# Patient Record
Sex: FEMALE | Race: WHITE | NOT HISPANIC OR LATINO | Employment: OTHER | URBAN - METROPOLITAN AREA
[De-identification: names, ages, dates, MRNs, and addresses within clinical notes are randomized per-mention and may not be internally consistent; named-entity substitution may affect disease eponyms.]

---

## 2017-05-22 ENCOUNTER — APPOINTMENT (OUTPATIENT)
Dept: RADIOLOGY | Facility: MEDICAL CENTER | Age: 57
DRG: 040 | End: 2017-05-22
Attending: EMERGENCY MEDICINE
Payer: MEDICARE

## 2017-05-22 ENCOUNTER — HOSPITAL ENCOUNTER (INPATIENT)
Facility: MEDICAL CENTER | Age: 57
LOS: 9 days | DRG: 040 | End: 2017-06-01
Attending: EMERGENCY MEDICINE | Admitting: INTERNAL MEDICINE
Payer: MEDICARE

## 2017-05-22 DIAGNOSIS — R51.9 ACUTE NONINTRACTABLE HEADACHE, UNSPECIFIED HEADACHE TYPE: ICD-10-CM

## 2017-05-22 PROCEDURE — 85027 COMPLETE CBC AUTOMATED: CPT

## 2017-05-22 PROCEDURE — 85007 BL SMEAR W/DIFF WBC COUNT: CPT

## 2017-05-22 PROCEDURE — 96374 THER/PROPH/DIAG INJ IV PUSH: CPT

## 2017-05-22 PROCEDURE — 36415 COLL VENOUS BLD VENIPUNCTURE: CPT

## 2017-05-22 PROCEDURE — 86140 C-REACTIVE PROTEIN: CPT

## 2017-05-22 PROCEDURE — 80053 COMPREHEN METABOLIC PANEL: CPT

## 2017-05-22 PROCEDURE — 700105 HCHG RX REV CODE 258: Performed by: EMERGENCY MEDICINE

## 2017-05-22 PROCEDURE — 700111 HCHG RX REV CODE 636 W/ 250 OVERRIDE (IP): Performed by: EMERGENCY MEDICINE

## 2017-05-22 PROCEDURE — 99285 EMERGENCY DEPT VISIT HI MDM: CPT

## 2017-05-22 PROCEDURE — 94760 N-INVAS EAR/PLS OXIMETRY 1: CPT

## 2017-05-22 PROCEDURE — 96361 HYDRATE IV INFUSION ADD-ON: CPT

## 2017-05-22 PROCEDURE — 96375 TX/PRO/DX INJ NEW DRUG ADDON: CPT

## 2017-05-22 PROCEDURE — 85652 RBC SED RATE AUTOMATED: CPT

## 2017-05-22 RX ORDER — ONDANSETRON 2 MG/ML
4 INJECTION INTRAMUSCULAR; INTRAVENOUS ONCE
Status: COMPLETED | OUTPATIENT
Start: 2017-05-22 | End: 2017-05-22

## 2017-05-22 RX ORDER — METHYLPREDNISOLONE SODIUM SUCCINATE 125 MG/2ML
125 INJECTION, POWDER, LYOPHILIZED, FOR SOLUTION INTRAMUSCULAR; INTRAVENOUS ONCE
Status: COMPLETED | OUTPATIENT
Start: 2017-05-22 | End: 2017-05-22

## 2017-05-22 RX ORDER — SODIUM CHLORIDE 9 MG/ML
1000 INJECTION, SOLUTION INTRAVENOUS ONCE
Status: COMPLETED | OUTPATIENT
Start: 2017-05-22 | End: 2017-05-22

## 2017-05-22 RX ADMIN — METHYLPREDNISOLONE SODIUM SUCCINATE 125 MG: 125 INJECTION, POWDER, FOR SOLUTION INTRAMUSCULAR; INTRAVENOUS at 23:33

## 2017-05-22 RX ADMIN — ONDANSETRON 4 MG: 2 INJECTION INTRAMUSCULAR; INTRAVENOUS at 23:32

## 2017-05-22 RX ADMIN — FENTANYL CITRATE 100 MCG: 50 INJECTION, SOLUTION INTRAMUSCULAR; INTRAVENOUS at 23:33

## 2017-05-22 RX ADMIN — SODIUM CHLORIDE 1000 ML: 9 INJECTION, SOLUTION INTRAVENOUS at 23:33

## 2017-05-22 ASSESSMENT — PAIN SCALES - GENERAL: PAINLEVEL_OUTOF10: 10

## 2017-05-22 NOTE — IP AVS SNAPSHOT
Culturalite Access Code: [email or web address]  Expires: 7/1/2017  4:18 PM    Your email address is not on file at [facility].  Email Addresses are required for you to sign up for Culturalite, please contact [phone] to verify your personal information and to provide your email address prior to attempting to register for Culturalite.    Justa Segovia   [address]  [place] [address]    Culturalite  A secure, online tool to manage your health information     [facility]’s Culturalite® is a secure, online tool that connects you to your personalized health information from the privacy of your home -- day or night - making it very easy for you to manage your healthcare. Once the activation process is completed, you can even access your medical information using the Culturalite stiven, which is available for free in the Apple Stiven store or Google Play store.     To learn more about Culturalite, visit [email or web address]    There are two levels of access available (as shown below):   My Chart Features  [facility] Primary Care Doctor [facility]  Specialists [facility]  Urgent  Care Non-[facility] Primary Care Doctor   Email your healthcare team securely and privately 24/7 X X X    Manage appointments: schedule your next appointment; view details of past/upcoming appointments X      Request prescription refills. X      View recent personal medical records, including lab and immunizations X X X X   View health record, including health history, allergies, medications X X X X   Read reports about your outpatient visits, procedures, consult and ER notes X X X X   See your discharge summary, which is a recap of your hospital and/or ER visit that includes your diagnosis, lab results, and care plan X X  X     How to register for [employer]:  Once your e-mail address has been verified, follow the following steps to sign up for Culturalite.     1. Go to  [email or web address]  2. Click on the Sign Up Now box, which takes you to the New Member Sign Up page. You will need to  provide the following information:  a. Enter your [facility] Access Code exactly as it appears at the top of this page. (You will not need to use this code after you’ve completed the sign-up process. If you do not sign up before the expiration date, you must request a new code.)   b. Enter your date of birth.   c. Enter your home email address.   d. Click Submit, and follow the next screen’s instructions.  3. Create a [facility] ID. This will be your [facility] login ID and cannot be changed, so think of one that is secure and easy to remember.  4. Create a [facility] password. You can change your password at any time.  5. Enter your Password Reset Question and Answer. This can be used at a later time if you forget your password.   6. Enter your e-mail address. This allows you to receive e-mail notifications when new information is available in [facility].  7. Click Sign Up. You can now view your health information.    For assistance activating your [facility] account, call [phone]

## 2017-05-22 NOTE — IP AVS SNAPSHOT
" Home Care Instructions                                                                                                                  Name:Justa Segovia  Medical Record Number:[id number]  CSN: [id number]    YOB: 1960   Age: 56 y.o.  Sex: female  HT:1.575 m (5' 2\") WT: 74.8 kg (164 lb 14.5 oz)          Admit Date: 5/22/2017     Discharge Date:   Today's Date: 6/1/2017  Attending Doctor:  [facility] [place]                  Allergies:  Tricyclic antidepressants          Follow-up Information     1. Follow up with VENUS Guerrero M.D.. Schedule an appointment as soon as possible for a visit in 1 week.    Specialty:  Neurology    Contact information    [address] [place] [address]  [phone]          2. Follow up with Vitaliy Peters M.D..    Specialty:  Cardiology    Contact information    [address] [facility] [id number] [phone]           Discharge Medication Instructions:    Below are the medications your physician expects you to take upon discharge:    Review all your home medications and newly ordered medications with your doctor and/or pharmacist. Follow medication instructions as directed by your doctor and/or pharmacist.    Please keep your medication list with you and share with your physician.               Medication List      START taking these medications        Instructions    Morning Afternoon Evening Bedtime    ondansetron 4 MG Tbdp   Commonly known as:  ZOFRAN ODT        Take 1 Tab by mouth every four hours as needed for Nausea/Vomiting (give PO if no IV route available).   Dose:  4 mg                        predniSONE 20 MG Tabs   Commonly known as:  DELTASONE        Take 4 tablets daily for 4 days, then take 3 tablets daily for 4 days, then take 2 tablets daily for 4 days, then take one tablet daily for 4 days, then stop                          CONTINUE taking these medications        Instructions    Morning Afternoon Evening Bedtime    5- MG " Caps        Take 100 mg by mouth every bedtime.   Dose:  100 mg                        Acetyl-L-Carnitine HCl Powd        Take 500 mg by mouth every day.   Dose:  500 mg                        ascorbic acid 500 MG Tabs   Commonly known as:  ascorbic acid        Take 500 mg by mouth every day.   Dose:  500 mg                        B-12 5000 MCG Caps        Take 1 Cap by mouth every day.   Dose:  1 Cap                        CALCIUM 1200 PO        Take 1 Tab by mouth every day.   Dose:  1 Tab                        D-3-5 5000 UNIT Caps   Generic drug:  cholecalciferol        Take 5,000 Units by mouth every day.   Dose:  5000 Units                        darifenacin 15 MG SR tablet   Commonly known as:  ENABLEX        Take 15 mg by mouth every bedtime.   Dose:  15 mg                        Fish Oil 1200 MG Caps        Take 1 Tab by mouth every day.   Dose:  1 Tab                        Green Tea (Camillia sinensis) 200 MG Caps        Take 400 mg by mouth every day.   Dose:  400 mg                        Magnesium 500 MG Caps        Take 1 Cap by mouth every day.   Dose:  1 Cap                        Magnesium Phosphate Powd        Take 1 Dose by mouth every bedtime.   Dose:  1 Dose                        metoprolol SR 25 MG Tb24   Commonly known as:  TOPROL XL        Take 25 mg by mouth 2 Times a Day.   Dose:  25 mg                        pravastatin 40 MG tablet   Last time this was given:  40 mg on 5/31/2017  9:10 PM   Commonly known as:  PRAVACHOL        Take 40 mg by mouth every evening.   Dose:  40 mg                        pregabalin 75 MG Caps   Last time this was given:  75 mg on 6/1/2017  9:42 AM   Commonly known as:  LYRICA        Take 75 mg by mouth 2 times a day.   Dose:  75 mg                        PROBIOTIC DAILY PO        Take 1 Tab by mouth every day.   Dose:  1 Tab                        sertraline 50 MG Tabs   Last time this was given:  50 mg on 5/31/2017  9:09 PM   Commonly known as:  ZOLOFT         Take 50 mg by mouth every bedtime.   Dose:  50 mg                        TECFIDERA 240 MG Cpdr   Generic drug:  Dimethyl Fumarate        Take 240 mg by mouth 2 times a day. Indications: Multiple Sclerosis   Dose:  240 mg                        therapeutic multivitamin-minerals Tabs        Take 1 Tab by mouth every day.   Dose:  1 Tab                        tizanidine 2 MG tablet   Last time this was given:  2 mg on 6/1/2017  9:42 AM   Commonly known as:  ZANAFLEX        Take 2 mg by mouth 2 Times a Day.   Dose:  2 mg                        Turmeric 500 MG Caps        Take 1 Cap by mouth every day.   Dose:  1 Cap                             Where to Get Your Medications      Information about where to get these medications is not yet available     ! Ask your nurse or doctor about these medications    - ondansetron 4 MG Tbdp  - predniSONE 20 MG Tabs            Orders for after discharge     .    Complete by:  As directed    Home oxygen evaluation       DME O2 New Set Up    Complete by:  As directed              Instructions           Diet / Nutrition:    Follow any diet instructions given to you by your doctor or the dietician, including how much salt (sodium) you are allowed each day.    If you are overweight, talk to your doctor about a weight reduction plan.    Activity:    Remain physically active following your doctor's instructions about exercise and activity.    Rest often.     Any time you become even a little tired or short of breath, SIT DOWN and rest.    Worsening Symptoms:    Report any of the following signs and symptoms to the doctor's office immediately:    *Pain of jaw, arm, or neck  *Chest pain not relieved by medication                               *Dizziness or loss of consciousness  *Difficulty breathing even when at rest   *More tired than usual                                       *Bleeding drainage or swelling of surgical site  *Swelling of feet, ankles, legs or stomach                 *Fever  (>100ºF)  *Pink or blood tinged sputum  *Weight gain (3lbs/day or 5lbs /week)           *Shock from internal defibrillator (if applicable)  *Palpitations or irregular heartbeats                *Cool and/or numb extremities    Stroke Awareness    Common Risk Factors for Stroke include:    Age  Atrial Fibrillation  Carotid Artery Stenosis  Diabetes Mellitus  Excessive alcohol consumption  High blood pressure  Overweight   Physical inactivity  Smoking    Warning signs and symptoms of a stroke include:    *Sudden numbness or weakness of the face, arm or leg (especially on one side of the body).  *Sudden confusion, trouble speaking or understanding.  *Sudden trouble seeing in one or both eyes.  *Sudden trouble walking, dizziness, loss of balance or coordination.Sudden severe headache with no known cause.    It is very important to get treatment quickly when a stroke occurs. If you experience any of the above warning signs, call 911 immediately.                   Disclaimer         Quit Smoking / Tobacco Use:    I understand the use of any tobacco products increases my chance of suffering from future heart disease or stroke and could cause other illnesses which may shorten my life. Quitting the use of tobacco products is the single most important thing I can do to improve my health. For further information on smoking / tobacco cessation call a Toll Free Quit Line at 1-907.170.8873 (*[employer] [place]) or 1-670.613.5178 (American Lung Association) or you can access the web based program at www.lungusa.org.    [place] Tobacco Users Help Line:  (360) 149-9914       Toll Free: 1-154.744.1846  Quit Tobacco Program [facility] Management Services [phone]    Crisis Hotline:    [place] Crisis Hotline:  0-694-ZXUTZKN or 1-396.195.9473    [place] Crisis Hotline:    1-433.852.2424 or 685-424-5710    Discharge Survey:   Thank you for choosing [facility]. We hope we did everything we could to make your hospital stay  a pleasant one. You may be receiving a phone survey and we would appreciate your time and participation in answering the questions. Your input is very valuable to us in our efforts to improve our service to our patients and their families.        My signature on this form indicates that:    1. I have reviewed and understand the above information.  2. My questions regarding this information have been answered to my satisfaction.  3. I have formulated a plan with my discharge nurse to obtain my prescribed medications for home.                  Disclaimer         __________________________________                     __________       ________                       Patient Signature                                                 Date                    Time

## 2017-05-22 NOTE — IP AVS SNAPSHOT
6/1/2017    Justa Segovia  [address]  [facility] [address]    Dear Justa:    [facility] wants to ensure your discharge home is safe and you or your loved ones have had all of your questions answered regarding your care after you leave the hospital.    Below is a list of resources and contact information should you have any questions regarding your hospital stay, follow-up instructions, or active medical symptoms.    Questions or Concerns Regarding… Contact   Medical Questions Related to Your Discharge  (7 days a week, 8am-5pm) Contact a Nurse Care Coordinator   [phone]   Medical Questions Not Related to Your Discharge  (24 hours a day / 7 days a week)  Contact the Nurse Health Line   [phone]    Medications or Discharge Instructions Refer to your discharge packet   or contact your [facility] Primary Care Provider   [phone]   Follow-up Appointment(s) Schedule your appointment via thereNow   or contact Scheduling [phone]   Billing Review your statement via thereNow  or contact Billing [phone]   Medical Records Review your records via thereNow   or contact Medical Records [phone]     You may receive a telephone call within two days of discharge. This call is to make certain you understand your discharge instructions and have the opportunity to have any questions answered. You can also easily access your medical information, test results and upcoming appointments via the thereNow free online health management tool. You can learn more and sign up at [email or web address]. For assistance setting up your thereNow account, please call [phone].    Once again, we want to ensure your discharge home is safe and that you have a clear understanding of any next steps in your care. If you have any questions or concerns, please do not hesitate to contact us, we are here for you. Thank you for choosing [facility] for your healthcare needs.    Sincerely,    Your [facility] Healthcare Team

## 2017-05-22 NOTE — IP AVS SNAPSHOT
" <p align=\"LEFT\"><IMG SRC=\"//[email or web address]/blob$/[email or web address]\" alt=\"Image\" WIDTH=\"50%\" HEIGHT=\"200\" BORDER=\"\"></p>                   Name:Justa Segovia  Medical Record Number:[id number]  CSN: [id number]    YOB: 1960   Age: 56 y.o.  Sex: female  HT:1.575 m (5' 2\") WT: 74.8 kg (164 lb 14.5 oz)          Admit Date: 5/22/2017     Discharge Date:   Today's Date: 6/1/2017  Attending Doctor:  [facility] [place]                  Allergies:  Tricyclic antidepressants          Follow-up Information     1. Follow up with VENUS Guerrero M.D.. Schedule an appointment as soon as possible for a visit in 1 week.    Specialty:  Neurology    Contact information    [address] [place] [address]  [phone]          2. Follow up with Vitaliy Peters M.D..    Specialty:  Cardiology    Contact information    [address] [facility] [address]  [phone]           Medication List      Take these Medications        Instructions    5- MG Caps    Take 100 mg by mouth every bedtime.   Dose:  100 mg       Acetyl-L-Carnitine HCl Powd    Take 500 mg by mouth every day.   Dose:  500 mg       ascorbic acid 500 MG Tabs   Commonly known as:  ascorbic acid    Take 500 mg by mouth every day.   Dose:  500 mg       B-12 5000 MCG Caps    Take 1 Cap by mouth every day.   Dose:  1 Cap       CALCIUM 1200 PO    Take 1 Tab by mouth every day.   Dose:  1 Tab       D-3-5 5000 UNIT Caps   Generic drug:  cholecalciferol    Take 5,000 Units by mouth every day.   Dose:  5000 Units       darifenacin 15 MG SR tablet   Commonly known as:  ENABLEX    Take 15 mg by mouth every bedtime.   Dose:  15 mg       Fish Oil 1200 MG Caps    Take 1 Tab by mouth every day.   Dose:  1 Tab       Green Tea (Camillia sinensis) 200 MG Caps    Take 400 mg by mouth every day.   Dose:  400 mg       Magnesium 500 MG Caps    Take 1 Cap by mouth every day.   Dose:  1 Cap       Magnesium Phosphate Powd    Take 1 Dose by mouth every bedtime.   Dose:  " 1 Dose       metoprolol SR 25 MG Tb24   Commonly known as:  TOPROL XL    Take 25 mg by mouth 2 Times a Day.   Dose:  25 mg       ondansetron 4 MG Tbdp   Commonly known as:  ZOFRAN ODT    Take 1 Tab by mouth every four hours as needed for Nausea/Vomiting (give PO if no IV route available).   Dose:  4 mg       pravastatin 40 MG tablet   Commonly known as:  PRAVACHOL    Take 40 mg by mouth every evening.   Dose:  40 mg       predniSONE 20 MG Tabs   Commonly known as:  DELTASONE    Take 4 tablets daily for 4 days, then take 3 tablets daily for 4 days, then take 2 tablets daily for 4 days, then take one tablet daily for 4 days, then stop       pregabalin 75 MG Caps   Commonly known as:  LYRICA    Take 75 mg by mouth 2 times a day.   Dose:  75 mg       PROBIOTIC DAILY PO    Take 1 Tab by mouth every day.   Dose:  1 Tab       sertraline 50 MG Tabs   Commonly known as:  ZOLOFT    Take 50 mg by mouth every bedtime.   Dose:  50 mg       TECFIDERA 240 MG Cpdr   Generic drug:  Dimethyl Fumarate    Take 240 mg by mouth 2 times a day. Indications: Multiple Sclerosis   Dose:  240 mg       therapeutic multivitamin-minerals Tabs    Take 1 Tab by mouth every day.   Dose:  1 Tab       tizanidine 2 MG tablet   Commonly known as:  ZANAFLEX    Take 2 mg by mouth 2 Times a Day.   Dose:  2 mg       Turmeric 500 MG Caps    Take 1 Cap by mouth every day.   Dose:  1 Cap

## 2017-05-23 ENCOUNTER — APPOINTMENT (OUTPATIENT)
Dept: RADIOLOGY | Facility: MEDICAL CENTER | Age: 57
DRG: 040 | End: 2017-05-23
Attending: INTERNAL MEDICINE
Payer: MEDICARE

## 2017-05-23 ENCOUNTER — RESOLUTE PROFESSIONAL BILLING HOSPITAL PROF FEE (OUTPATIENT)
Dept: HOSPITALIST | Facility: MEDICAL CENTER | Age: 57
End: 2017-05-23
Payer: MEDICARE

## 2017-05-23 PROBLEM — G35 MULTIPLE SCLEROSIS EXACERBATION (HCC): Status: ACTIVE | Noted: 2017-05-23

## 2017-05-23 PROBLEM — M31.6 TEMPORAL ARTERITIS (HCC): Status: ACTIVE | Noted: 2017-05-23

## 2017-05-23 LAB
ALBUMIN SERPL BCP-MCNC: 3.6 G/DL (ref 3.2–4.9)
ALBUMIN/GLOB SERPL: 1 G/DL
ALP SERPL-CCNC: 105 U/L (ref 30–99)
ALT SERPL-CCNC: 22 U/L (ref 2–50)
ANION GAP SERPL CALC-SCNC: 12 MMOL/L (ref 0–11.9)
APPEARANCE UR: CLEAR
AST SERPL-CCNC: 27 U/L (ref 12–45)
BASOPHILS # BLD AUTO: 0 % (ref 0–1.8)
BASOPHILS # BLD: 0 K/UL (ref 0–0.12)
BILIRUB SERPL-MCNC: 0.7 MG/DL (ref 0.1–1.5)
BILIRUB UR QL STRIP.AUTO: NEGATIVE
BUN SERPL-MCNC: 20 MG/DL (ref 8–22)
CALCIUM SERPL-MCNC: 9.2 MG/DL (ref 8.5–10.5)
CHLORIDE SERPL-SCNC: 101 MMOL/L (ref 96–112)
CO2 SERPL-SCNC: 21 MMOL/L (ref 20–33)
COLOR UR: ABNORMAL
CREAT SERPL-MCNC: 1.25 MG/DL (ref 0.5–1.4)
CRP SERPL HS-MCNC: 48.19 MG/DL (ref 0–0.75)
EKG IMPRESSION: NORMAL
EOSINOPHIL # BLD AUTO: 0 K/UL (ref 0–0.51)
EOSINOPHIL NFR BLD: 0 % (ref 0–6.9)
EPI CELLS #/AREA URNS HPF: ABNORMAL /HPF
ERYTHROCYTE [DISTWIDTH] IN BLOOD BY AUTOMATED COUNT: 43.2 FL (ref 35.9–50)
ERYTHROCYTE [SEDIMENTATION RATE] IN BLOOD BY WESTERGREN METHOD: 114 MM/HOUR (ref 0–30)
GFR SERPL CREATININE-BSD FRML MDRD: 45 ML/MIN/1.73 M 2
GLOBULIN SER CALC-MCNC: 3.6 G/DL (ref 1.9–3.5)
GLUCOSE SERPL-MCNC: 125 MG/DL (ref 65–99)
GLUCOSE UR STRIP.AUTO-MCNC: NEGATIVE MG/DL
HCT VFR BLD AUTO: 35.7 % (ref 37–47)
HGB BLD-MCNC: 12.1 G/DL (ref 12–16)
KETONES UR STRIP.AUTO-MCNC: 10 MG/DL
LEUKOCYTE ESTERASE UR QL STRIP.AUTO: NEGATIVE
LG PLATELETS BLD QL SMEAR: NORMAL
LYMPHOCYTES # BLD AUTO: 0.95 K/UL (ref 1–4.8)
LYMPHOCYTES NFR BLD: 14 % (ref 22–41)
MAGNESIUM SERPL-MCNC: 2.2 MG/DL (ref 1.5–2.5)
MANUAL DIFF BLD: NORMAL
MCH RBC QN AUTO: 32.3 PG (ref 27–33)
MCHC RBC AUTO-ENTMCNC: 33.9 G/DL (ref 33.6–35)
MCV RBC AUTO: 95.2 FL (ref 81.4–97.8)
MICRO URNS: ABNORMAL
MONOCYTES # BLD AUTO: 0.2 K/UL (ref 0–0.85)
MONOCYTES NFR BLD AUTO: 3 % (ref 0–13.4)
MORPHOLOGY BLD-IMP: NORMAL
NEUTROPHILS # BLD AUTO: 5.64 K/UL (ref 2–7.15)
NEUTROPHILS NFR BLD: 77 % (ref 44–72)
NEUTS BAND NFR BLD MANUAL: 6 % (ref 0–10)
NITRITE UR QL STRIP.AUTO: NEGATIVE
NRBC # BLD AUTO: 0 K/UL
NRBC BLD AUTO-RTO: 0 /100 WBC
PH UR STRIP.AUTO: 6 [PH]
PLATELET # BLD AUTO: 212 K/UL (ref 164–446)
PLATELET BLD QL SMEAR: NORMAL
PMV BLD AUTO: 11.7 FL (ref 9–12.9)
POTASSIUM SERPL-SCNC: 3.8 MMOL/L (ref 3.6–5.5)
PROT SERPL-MCNC: 7.2 G/DL (ref 6–8.2)
PROT UR QL STRIP: NEGATIVE MG/DL
RBC # BLD AUTO: 3.75 M/UL (ref 4.2–5.4)
RBC # URNS HPF: ABNORMAL /HPF
RBC BLD AUTO: NORMAL
RBC UR QL AUTO: ABNORMAL
SMUDGE CELLS BLD QL SMEAR: NORMAL
SODIUM SERPL-SCNC: 134 MMOL/L (ref 135–145)
SP GR UR STRIP.AUTO: 1.01
WBC # BLD AUTO: 6.8 K/UL (ref 4.8–10.8)
WBC #/AREA URNS HPF: ABNORMAL /HPF

## 2017-05-23 PROCEDURE — 96375 TX/PRO/DX INJ NEW DRUG ADDON: CPT

## 2017-05-23 PROCEDURE — 700111 HCHG RX REV CODE 636 W/ 250 OVERRIDE (IP): Performed by: EMERGENCY MEDICINE

## 2017-05-23 PROCEDURE — A9270 NON-COVERED ITEM OR SERVICE: HCPCS | Performed by: INTERNAL MEDICINE

## 2017-05-23 PROCEDURE — 72156 MRI NECK SPINE W/O & W/DYE: CPT

## 2017-05-23 PROCEDURE — 99223 1ST HOSP IP/OBS HIGH 75: CPT | Mod: AI,GC | Performed by: INTERNAL MEDICINE

## 2017-05-23 PROCEDURE — 71010 DX-CHEST-PORTABLE (1 VIEW): CPT

## 2017-05-23 PROCEDURE — 700111 HCHG RX REV CODE 636 W/ 250 OVERRIDE (IP): Performed by: INTERNAL MEDICINE

## 2017-05-23 PROCEDURE — 36415 COLL VENOUS BLD VENIPUNCTURE: CPT

## 2017-05-23 PROCEDURE — 72158 MRI LUMBAR SPINE W/O & W/DYE: CPT

## 2017-05-23 PROCEDURE — 700102 HCHG RX REV CODE 250 W/ 637 OVERRIDE(OP): Performed by: INTERNAL MEDICINE

## 2017-05-23 PROCEDURE — 96361 HYDRATE IV INFUSION ADD-ON: CPT

## 2017-05-23 PROCEDURE — 93010 ELECTROCARDIOGRAM REPORT: CPT | Performed by: INTERNAL MEDICINE

## 2017-05-23 PROCEDURE — 81001 URINALYSIS AUTO W/SCOPE: CPT

## 2017-05-23 PROCEDURE — 700102 HCHG RX REV CODE 250 W/ 637 OVERRIDE(OP): Performed by: PSYCHIATRY & NEUROLOGY

## 2017-05-23 PROCEDURE — 70553 MRI BRAIN STEM W/O & W/DYE: CPT

## 2017-05-23 PROCEDURE — 70450 CT HEAD/BRAIN W/O DYE: CPT

## 2017-05-23 PROCEDURE — A9270 NON-COVERED ITEM OR SERVICE: HCPCS | Performed by: PSYCHIATRY & NEUROLOGY

## 2017-05-23 PROCEDURE — 700105 HCHG RX REV CODE 258: Performed by: PSYCHIATRY & NEUROLOGY

## 2017-05-23 PROCEDURE — 83735 ASSAY OF MAGNESIUM: CPT

## 2017-05-23 PROCEDURE — 700117 HCHG RX CONTRAST REV CODE 255: Performed by: INTERNAL MEDICINE

## 2017-05-23 PROCEDURE — A9577 INJ MULTIHANCE: HCPCS | Performed by: INTERNAL MEDICINE

## 2017-05-23 PROCEDURE — 93005 ELECTROCARDIOGRAM TRACING: CPT | Performed by: STUDENT IN AN ORGANIZED HEALTH CARE EDUCATION/TRAINING PROGRAM

## 2017-05-23 PROCEDURE — 700105 HCHG RX REV CODE 258: Performed by: INTERNAL MEDICINE

## 2017-05-23 PROCEDURE — 770020 HCHG ROOM/CARE - TELE (206)

## 2017-05-23 PROCEDURE — 700111 HCHG RX REV CODE 636 W/ 250 OVERRIDE (IP): Performed by: PSYCHIATRY & NEUROLOGY

## 2017-05-23 RX ORDER — ASCORBIC ACID 125 MG
1 TABLET,CHEWABLE ORAL DAILY
COMMUNITY

## 2017-05-23 RX ORDER — PREGABALIN 75 MG/1
75 CAPSULE ORAL 2 TIMES DAILY
COMMUNITY

## 2017-05-23 RX ORDER — ONDANSETRON 2 MG/ML
4 INJECTION INTRAMUSCULAR; INTRAVENOUS EVERY 4 HOURS PRN
Status: DISCONTINUED | OUTPATIENT
Start: 2017-05-23 | End: 2017-06-02 | Stop reason: HOSPADM

## 2017-05-23 RX ORDER — SODIUM CHLORIDE 9 MG/ML
INJECTION, SOLUTION INTRAVENOUS CONTINUOUS
Status: DISCONTINUED | OUTPATIENT
Start: 2017-05-23 | End: 2017-05-25 | Stop reason: ALTCHOICE

## 2017-05-23 RX ORDER — ACETYLCARNITINE HCL 100 %
500 POWDER (GRAM) MISCELLANEOUS DAILY
COMMUNITY

## 2017-05-23 RX ORDER — FOLIC ACID 0.8 MG
1 TABLET ORAL DAILY
COMMUNITY

## 2017-05-23 RX ORDER — BISACODYL 10 MG
10 SUPPOSITORY, RECTAL RECTAL
Status: DISCONTINUED | OUTPATIENT
Start: 2017-05-23 | End: 2017-06-02 | Stop reason: HOSPADM

## 2017-05-23 RX ORDER — PRAVASTATIN SODIUM 20 MG
40 TABLET ORAL NIGHTLY
Status: DISCONTINUED | OUTPATIENT
Start: 2017-05-23 | End: 2017-06-02 | Stop reason: HOSPADM

## 2017-05-23 RX ORDER — PROMETHAZINE HYDROCHLORIDE 25 MG/1
12.5-25 TABLET ORAL EVERY 4 HOURS PRN
Status: DISCONTINUED | OUTPATIENT
Start: 2017-05-23 | End: 2017-06-02 | Stop reason: HOSPADM

## 2017-05-23 RX ORDER — AMOXICILLIN 250 MG
2 CAPSULE ORAL 2 TIMES DAILY
Status: DISCONTINUED | OUTPATIENT
Start: 2017-05-23 | End: 2017-06-02 | Stop reason: HOSPADM

## 2017-05-23 RX ORDER — ONDANSETRON 4 MG/1
4 TABLET, ORALLY DISINTEGRATING ORAL EVERY 4 HOURS PRN
Status: DISCONTINUED | OUTPATIENT
Start: 2017-05-23 | End: 2017-06-02 | Stop reason: HOSPADM

## 2017-05-23 RX ORDER — METOPROLOL SUCCINATE 25 MG/1
25 TABLET, EXTENDED RELEASE ORAL 2 TIMES DAILY
COMMUNITY
End: 2019-11-17

## 2017-05-23 RX ORDER — POLYETHYLENE GLYCOL 3350 17 G/17G
1 POWDER, FOR SOLUTION ORAL
Status: DISCONTINUED | OUTPATIENT
Start: 2017-05-23 | End: 2017-06-02 | Stop reason: HOSPADM

## 2017-05-23 RX ORDER — PRAVASTATIN SODIUM 40 MG
40 TABLET ORAL NIGHTLY
COMMUNITY

## 2017-05-23 RX ORDER — AMOXICILLIN 500 MG
1 CAPSULE ORAL DAILY
COMMUNITY

## 2017-05-23 RX ORDER — M-VIT,TX,IRON,MINS/CALC/FOLIC 27MG-0.4MG
1 TABLET ORAL DAILY
COMMUNITY

## 2017-05-23 RX ORDER — DARIFENACIN HYDROBROMIDE 15 MG/1
15 TABLET, EXTENDED RELEASE ORAL
COMMUNITY

## 2017-05-23 RX ORDER — GUAIFENESIN/EPHEDRINE HCL 200-12.5MG
100 TABLET ORAL
COMMUNITY

## 2017-05-23 RX ORDER — DIMETHYL FUMARATE 240 MG/1
240 CAPSULE ORAL 2 TIMES DAILY
Status: DISCONTINUED | OUTPATIENT
Start: 2017-05-23 | End: 2017-05-23

## 2017-05-23 RX ORDER — OXYCODONE HYDROCHLORIDE AND ACETAMINOPHEN 5; 325 MG/1; MG/1
1 TABLET ORAL EVERY 4 HOURS PRN
Status: DISCONTINUED | OUTPATIENT
Start: 2017-05-23 | End: 2017-05-24

## 2017-05-23 RX ORDER — ACETYLCARNITINE HCL 100 %
POWDER (GRAM) MISCELLANEOUS
Status: ON HOLD | COMMUNITY
End: 2017-05-23

## 2017-05-23 RX ORDER — VIT C/B6/B5/MAGNESIUM/HERB 173 50-5-6-5MG
1 CAPSULE ORAL DAILY
COMMUNITY

## 2017-05-23 RX ORDER — OXYBUTYNIN CHLORIDE 10 MG/1
10 TABLET, EXTENDED RELEASE ORAL EVERY EVENING
Status: DISCONTINUED | OUTPATIENT
Start: 2017-05-23 | End: 2017-06-02 | Stop reason: HOSPADM

## 2017-05-23 RX ORDER — TIZANIDINE 4 MG/1
2 TABLET ORAL 2 TIMES DAILY
Status: DISCONTINUED | OUTPATIENT
Start: 2017-05-23 | End: 2017-06-02 | Stop reason: HOSPADM

## 2017-05-23 RX ORDER — PREGABALIN 75 MG/1
75 CAPSULE ORAL 2 TIMES DAILY
Status: DISCONTINUED | OUTPATIENT
Start: 2017-05-23 | End: 2017-06-02 | Stop reason: HOSPADM

## 2017-05-23 RX ORDER — CARBAMAZEPINE 200 MG/1
200 TABLET ORAL DAILY
Status: DISCONTINUED | OUTPATIENT
Start: 2017-05-23 | End: 2017-05-23

## 2017-05-23 RX ORDER — ASCORBIC ACID 500 MG
500 TABLET ORAL DAILY
COMMUNITY

## 2017-05-23 RX ORDER — PRAVASTATIN SODIUM 20 MG
40 TABLET ORAL NIGHTLY
Status: ON HOLD | COMMUNITY
End: 2017-05-23

## 2017-05-23 RX ORDER — DIMETHYL FUMARATE 240 MG/1
240 CAPSULE ORAL 2 TIMES DAILY
COMMUNITY

## 2017-05-23 RX ORDER — UBIDECARENONE 75 MG
5000 CAPSULE ORAL DAILY
Status: ON HOLD | COMMUNITY
End: 2017-05-23

## 2017-05-23 RX ORDER — MAGNESIUM PHOSPHATE
1 POWDER (GRAM) MISCELLANEOUS
COMMUNITY
End: 2019-11-17

## 2017-05-23 RX ORDER — DARIFENACIN HYDROBROMIDE 15 MG/1
15 TABLET, EXTENDED RELEASE ORAL
Status: DISCONTINUED | OUTPATIENT
Start: 2017-05-23 | End: 2017-05-23

## 2017-05-23 RX ORDER — TIZANIDINE 4 MG/1
2 TABLET ORAL 2 TIMES DAILY
Status: ON HOLD | COMMUNITY
End: 2017-05-23

## 2017-05-23 RX ORDER — TIZANIDINE 2 MG/1
2 TABLET ORAL 2 TIMES DAILY
COMMUNITY

## 2017-05-23 RX ORDER — CARBAMAZEPINE 200 MG/1
200 TABLET ORAL 2 TIMES DAILY
Status: DISCONTINUED | OUTPATIENT
Start: 2017-05-23 | End: 2017-05-26

## 2017-05-23 RX ORDER — PROMETHAZINE HYDROCHLORIDE 25 MG/1
12.5-25 SUPPOSITORY RECTAL EVERY 4 HOURS PRN
Status: DISCONTINUED | OUTPATIENT
Start: 2017-05-23 | End: 2017-06-02 | Stop reason: HOSPADM

## 2017-05-23 RX ADMIN — SERTRALINE 50 MG: 50 TABLET, FILM COATED ORAL at 21:43

## 2017-05-23 RX ADMIN — STANDARDIZED SENNA CONCENTRATE AND DOCUSATE SODIUM 2 TABLET: 8.6; 5 TABLET, FILM COATED ORAL at 08:13

## 2017-05-23 RX ADMIN — OXYCODONE HYDROCHLORIDE AND ACETAMINOPHEN 1 TABLET: 5; 325 TABLET ORAL at 21:54

## 2017-05-23 RX ADMIN — SODIUM CHLORIDE 500 MG: 9 INJECTION, SOLUTION INTRAVENOUS at 06:32

## 2017-05-23 RX ADMIN — STANDARDIZED SENNA CONCENTRATE AND DOCUSATE SODIUM 2 TABLET: 8.6; 5 TABLET, FILM COATED ORAL at 21:42

## 2017-05-23 RX ADMIN — PRAVASTATIN SODIUM 40 MG: 20 TABLET ORAL at 21:42

## 2017-05-23 RX ADMIN — TIZANIDINE 2 MG: 4 TABLET ORAL at 08:13

## 2017-05-23 RX ADMIN — OXYCODONE HYDROCHLORIDE AND ACETAMINOPHEN 1 TABLET: 5; 325 TABLET ORAL at 14:27

## 2017-05-23 RX ADMIN — OXYCODONE HYDROCHLORIDE AND ACETAMINOPHEN 1 TABLET: 5; 325 TABLET ORAL at 18:03

## 2017-05-23 RX ADMIN — PROCHLORPERAZINE EDISYLATE 10 MG: 5 INJECTION INTRAMUSCULAR; INTRAVENOUS at 05:57

## 2017-05-23 RX ADMIN — PREGABALIN 75 MG: 75 CAPSULE ORAL at 08:13

## 2017-05-23 RX ADMIN — HYDROMORPHONE HYDROCHLORIDE 0.5 MG: 1 INJECTION, SOLUTION INTRAMUSCULAR; INTRAVENOUS; SUBCUTANEOUS at 05:42

## 2017-05-23 RX ADMIN — CARBAMAZEPINE 200 MG: 200 TABLET ORAL at 21:42

## 2017-05-23 RX ADMIN — OXYBUTYNIN CHLORIDE 10 MG: 10 TABLET, FILM COATED, EXTENDED RELEASE ORAL at 21:45

## 2017-05-23 RX ADMIN — PREGABALIN 75 MG: 75 CAPSULE ORAL at 21:44

## 2017-05-23 RX ADMIN — SODIUM CHLORIDE 500 MG: 9 INJECTION, SOLUTION INTRAVENOUS at 21:42

## 2017-05-23 RX ADMIN — TIZANIDINE 2 MG: 4 TABLET ORAL at 21:43

## 2017-05-23 RX ADMIN — GADOBENATE DIMEGLUMINE 10 ML: 529 INJECTION, SOLUTION INTRAVENOUS at 12:27

## 2017-05-23 RX ADMIN — ENOXAPARIN SODIUM 40 MG: 100 INJECTION SUBCUTANEOUS at 08:12

## 2017-05-23 RX ADMIN — SODIUM CHLORIDE: 9 INJECTION, SOLUTION INTRAVENOUS at 01:36

## 2017-05-23 RX ADMIN — HYDROMORPHONE HYDROCHLORIDE 1 MG: 1 INJECTION, SOLUTION INTRAMUSCULAR; INTRAVENOUS; SUBCUTANEOUS at 01:32

## 2017-05-23 RX ADMIN — CARBAMAZEPINE 200 MG: 200 TABLET ORAL at 03:28

## 2017-05-23 RX ADMIN — ONDANSETRON 4 MG: 2 INJECTION INTRAMUSCULAR; INTRAVENOUS at 05:24

## 2017-05-23 ASSESSMENT — ENCOUNTER SYMPTOMS
COUGH: 0
WEAKNESS: 1
FALLS: 0
CHILLS: 0
CHILLS: 1
VOMITING: 1
WHEEZING: 0
SPUTUM PRODUCTION: 0
SORE THROAT: 0
PALPITATIONS: 0
EYE REDNESS: 0
PHOTOPHOBIA: 0
HEARTBURN: 0
TINGLING: 1
SENSORY CHANGE: 1
NECK PAIN: 0
DIZZINESS: 0
HEMOPTYSIS: 0
ABDOMINAL PAIN: 0
SPEECH CHANGE: 0
CONSTIPATION: 0
SHORTNESS OF BREATH: 0
BRUISES/BLEEDS EASILY: 0
DOUBLE VISION: 0
TINGLING: 0
DIARRHEA: 0
WEIGHT LOSS: 0
MYALGIAS: 1
FEVER: 0
SENSORY CHANGE: 0
DIAPHORESIS: 0
BACK PAIN: 0
TREMORS: 0
EYE PAIN: 0
BLURRED VISION: 0
HEADACHES: 1
NAUSEA: 1
FOCAL WEAKNESS: 0
SEIZURES: 0
ORTHOPNEA: 0
NECK PAIN: 1
LOSS OF CONSCIOUSNESS: 0
DEPRESSION: 1

## 2017-05-23 ASSESSMENT — COPD QUESTIONNAIRES
DURING THE PAST 4 WEEKS HOW MUCH DID YOU FEEL SHORT OF BREATH: NONE/LITTLE OF THE TIME
HAVE YOU SMOKED AT LEAST 100 CIGARETTES IN YOUR ENTIRE LIFE: NO/DON'T KNOW
COPD SCREENING SCORE: 1
DO YOU EVER COUGH UP ANY MUCUS OR PHLEGM?: NO/ONLY WITH OCCASIONAL COLDS OR INFECTIONS

## 2017-05-23 ASSESSMENT — LIFESTYLE VARIABLES
EVER_SMOKED: NEVER
EVER_SMOKED: NEVER
ALCOHOL_USE: NO

## 2017-05-23 ASSESSMENT — PAIN SCALES - GENERAL
PAINLEVEL_OUTOF10: 6
PAINLEVEL_OUTOF10: 6
PAINLEVEL_OUTOF10: 0
PAINLEVEL_OUTOF10: 10
PAINLEVEL_OUTOF10: 3

## 2017-05-23 NOTE — H&P
[facility] Admitting History and Physical    Name Justa Segovia 1960   Age/Sex 56 y.o. female   MRN [id number]   Code Status Full     After 5PM or if no immediate response to page, please call for cross-coverage  Attending/Team: Dr Ivan/Preston Call [phone] to page   1st Call - Day Intern (R1):   Dr Russell 2nd Call - Day Sr. 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    Patient symptoms started on Thursday  (May 18th 2017) with severe Leg cramps, heaviness of both legs, followed 2 days later by severe headache unilateral left side temporal region and not responding to Tylenol, ibuprofen , Lyrica, Toprol XL, Tecfidera, and  Bladder control lost since yesterday.     Since yesterday her pain level in the temporal region increased to 10/ 10 electric shooting pains and went to the [facility] where she was given Toradol 30 mg IV Dilaudid 1 mg IV, Zofran 4 mg, diazepam 5 mg, and Fentanyl 100 mg IV with partial relief. She declined any recent exposure to sick contacts, or any urinary tract infection, cough, fever, chest pain, shortness of breath, or joint pains. She declined any history of visual disturbances, lacrimation, photophobia, jaw claudication, or seizures.    Her past medical history is significant for first time episode of multiple sclerosis, presenting as seizures while she was 22 years old, treated with phenobarbital.  After 10 years of history of suffering with seizures she was later diagnosed as multiple sclerosis. Her last flare was 12 years ago which was the worst one she can recollect. At that time she was  given very high doses of steroids (1200 mg of methylprednisolone for 5 days). She has been on Lyrica 75 mg, sertraline 50 mg, pravastatin 40 mg, Toprol-XL 25 mg, Tecfidera for MS, amantadine for muscle cramps, and hydromorphone for breakthrough pains. While she was on high doses of steroids she had some tachyarrhythmia's and was started on metoprolol for irregular heart rate which she has been continuing to take.     Surgical and endocrine history; she had a ruptured ovarian cyst which was hemorrhagic and underwent a right oophorectomy more than 10 years ago. She has never been pregnant, although she had male sexual partnerse as a teenager. She achieved her menopause 5 years ago.    Social history is significant for; she is a nurse by occupation, she is  to a female partner (Lesbian) , and lives with her. She occasionally uses marijuana, smoked in the past while teenager but never smoked later. Drinks occasionally alcohol on social events.     Family history significant for father  of multiple sclerosis, both parents had had hypercholesterolemia, she has 3 sisters older one has multiple sclerosis at 60 years , also has Niece with MS at the age of 29    Health maintenance history includes; colonoscopy 3 years ago was normal Pap smear and mammogram done in 2016 was normal.    ED Course: Pt was moaning with excruciating pain, despite IV Fentanyl 100 mg, Hydromophone, Ondanestron, and Methyprednisolone 125mg IV. Neuro was consulted recs appreciated.        Review of Systems   Constitutional: Negative for fever, chills, weight loss and diaphoresis.   HENT: Negative for ear pain, hearing loss, sore throat and tinnitus.    Eyes: Negative for blurred vision, double vision, photophobia, pain and redness.   Respiratory: Negative for cough, hemoptysis, sputum production, shortness of breath and wheezing.    Cardiovascular: Negative for chest pain, palpitations and orthopnea.   Gastrointestinal: Positive for  "nausea and vomiting. Negative for heartburn, abdominal pain, diarrhea and constipation.   Genitourinary:        Incontinence of Urine   Musculoskeletal: Positive for myalgias and neck pain. Negative for falls.   Skin: Negative for rash.   Neurological: Positive for tingling, sensory change, weakness and headaches. Negative for speech change, seizures and loss of consciousness.   Endo/Heme/Allergies: Does not bruise/bleed easily.   Psychiatric/Behavioral: Positive for depression. Negative for suicidal ideas.             Past Medical History:   Past Medical History   Diagnosis Date   • MS (multiple sclerosis) (CMS-HCC) 1993   • Hyperlipemia    • Migraines 1989   • Irregular heart rate 1997     \"short run VT\"   • Gilbert syndrome    • S/P MVR (mitral valve replacement)        Past Surgical History:  History reviewed. No pertinent past surgical history.    Current Outpatient Medications:  Home Medications     Reviewed by Taras Blanco R.N. (Registered Nurse) on 05/23/17 at 0511  Med List Status: Complete    Medication Last Dose Status    5-Hydroxytryptophan (5-HTP) 100 MG Cap  Active    Acetylcarnitine HCl (ACETYL-L-CARNITINE HCL) Powder  Active    ascorbic acid (ASCORBIC ACID) 500 MG Tab  Active    Calcium Carbonate-Vit D-Min (CALCIUM 1200 PO)  Active    cyanocobalamin (VITAMIN B-12) 100 MCG Tab  Active    darifenacin (ENABLEX) 15 MG SR tablet  Active    Dimethyl Fumarate (TECFIDERA) 240 MG CAPSULE DELAYED RELEASE  Active    Green Tea, Camillia sinensis, 200 MG Cap  Active    Magnesium 500 MG Cap  Active    Magnesium Phosphate Powder  Active    metoprolol (LOPRESSOR) 25 MG Tab  Active    Omega-3 Fatty Acids (FISH OIL) 1200 MG Cap  Active    pravastatin (PRAVACHOL) 20 MG Tab  Active    pregabalin (LYRICA) 75 MG Cap  Active    Prenatal Vit-Fe Fumarate-FA (M-VIT PO)  Active    Probiotic Product (PROBIOTIC DAILY PO)  Active    sertraline (ZOLOFT) 50 MG Tab  Active    tizanidine (ZANAFLEX) 4 MG Tab  Active    Turmeric 500 " "MG Cap  Active    vitamin D (CHOLECALCIFEROL) 1000 UNIT Tab  Active                Medication Allergy/Sensitivities:  Allergies not on file      Family History:  History reviewed. No pertinent family history.    Social History:  Social History     Social History   • Marital Status:      Spouse Name: N/A   • Number of Children: N/A   • Years of Education: N/A     Occupational History   • Not on file.     Social History Main Topics   • Smoking status: Not on file   • Smokeless tobacco: Not on file   • Alcohol Use: Not on file   • Drug Use: Not on file   • Sexual Activity: Not on file     Other Topics Concern   • Not on file     Social History Narrative   • No narrative on file     Living situation: Lives with her female partner  PCP : Vitaliy Peters M.D.        Physical Exam     Filed Vitals:    05/22/17 2313 05/22/17 2324 05/22/17 2330   BP:  91/53    Pulse:  79 72   Temp:  37.4 °C (99.3 °F)    Resp:  20    Height: 1.575 m (5' 2\")     Weight: 68.04 kg (150 lb)     SpO2:  96% 98%     Body mass index is 27.43 kg/(m^2).  BP 91/53 mmHg  Pulse 72  Temp(Src) 37.4 °C (99.3 °F)  Resp 20  Ht 1.575 m (5' 2\")  Wt 68.04 kg (150 lb)  BMI 27.43 kg/m2  SpO2 98%  O2 therapy: Pulse Oximetry: 98 %, O2 Delivery: None (Room Air)    Physical Exam   Constitutional: She is oriented to person, place, and time.   Patient is acutely distressed with lancinating pains, which seem to come like electric shocks, as she intermittently shakes her head    HENT:   Head: Normocephalic and atraumatic.   Mouth/Throat: Oropharynx is clear and moist. No oropharyngeal exudate.   Eyes: Conjunctivae and EOM are normal. Pupils are equal, round, and reactive to light. Right eye exhibits no discharge. Left eye exhibits no discharge.   Neck: Normal range of motion. Neck supple. No thyromegaly present.   Cardiovascular: Normal rate, regular rhythm and normal heart sounds.    No murmur heard.  Pulmonary/Chest: Effort normal and breath sounds " normal. No respiratory distress. She has no wheezes.   Abdominal: Soft. Bowel sounds are normal. She exhibits no distension. There is no tenderness.   Musculoskeletal: She exhibits no edema.   Lymphadenopathy:     She has no cervical adenopathy.   Neurological: She is alert and oriented to person, place, and time. She displays abnormal reflex. No cranial nerve deficit.   Knee jerks Brisk both sides  Biceps jerks normal  Sensations equal both sides   Skin: Skin is warm.   Psychiatric: Affect normal.             Data Review       Old Records Request:   Completed  Current Records review and summary: Completed    Lab Data Review:  No results found for this or any previous visit (from the past 24 hour(s)).    Imaging/Procedures Review:    ndependant Imaging Review: Completed  CT-HEAD W/O   Preliminary Result      DX-CHEST-PORTABLE (1 VIEW)    (Results Pending)            EKG:   EKG Independant Review: Completed      () Records reviewed and summarized in current documentation             Assessment/Plan     No new assessment & plan notes have been filed under this hospital service since the last note was generated.  Service: Hospital Medicine    Unilateral Headache: Trigeminal neuralgia Vs Temporal Arteritis:  --------------------------------------------------------------------------------------------  Patient has history of severe headache left temporal region 10 /10 intensity ,   Electric shock like lancinating pains  Nauseated feeling , vomiting   No visual disturbances, photophobia, lacrimation, jaw claudication  No motor weakness or speech changes  No loss of consciousness  No seizures  Pt partially responded to IV Toradol, diluadid, Fentanyl  ESR elevated  CRP is elevated    Features are more suggestive of trigeminal neuralgia    Plan  Admit to Neuro floor  Carbamazepine 200 mg  IV NS  Steroids  Zofran when necessary  Ct home meds including Lyrica  Monitor CBC, LFT for any agranulocytosis or liver  dysfunction  Neurologist consulted, recs appreciated   MRI brain      MS Exacerbation:    Pt has hx of MS since the age of 22, started as Seizures managed with Phenobarbital and diagnosed 10 yrs later as MS  Hx of worst episode 12 yrs ago requiring very high doses of Steroids (1200 mg ) * 5days  Was in remission for 10 yrs  Symptoms recurred since Thursday  (May 18th 2017)  Leg cramps, heaviness of both legs, followed by severe headache unilateral left side temporal region  Bladder control lost since yesterday  History of tachyarrhythmias  after high-dose of steroids in the past  CT head normal  ESR elevated      Plan   Neurology consulted ( recommendations appreciated_  Methylprednisolone 500 mg  Will obtain MRI  Urinalysis  Chest x-ray  EKG  Metoprolol Lyrica 75 mg  Toprol XL 25 mg  Tecfedra  Pain Management with Diluadid      Incontinence of urine  ---------------------------  Incontinence for Past 2 days after the flareup of MS   UA and culture  Will follow up  Consider antibiotics if evidence of infection    Depression  ------------------  On sertraline 50 mg  Continue same    Hx of Arrhythmias:  -----------------------  On metoprolol   EKG  Ct home meds        Anticipated Hospital stay:  >2 midnights        Quality Measures  Labs reviewed, EKG reviewed, Medications reviewed and Radiology images reviewed  Schmitt catheter: No Schmitt      DVT Prophylaxis: Enoxaparin (Lovenox)    Ulcer prophylaxis: No

## 2017-05-23 NOTE — ED NOTES
Pt bib EMS.   Chief Complaint   Patient presents with   • Multiple Sclerosis     Pt was transferred from [facility] d/t abnormal labs. Sed rate >140. Pt states she has not had a MS flare up for approx 10 years. Pt states this flare up started around thursday and progressively has gotten worse. Today has been the worst day and pt states she has had N/V. Pt also having severe head pain.     Pt placed on monitor. Blood drawn and sent to lab.   Chart up and ready for ERP

## 2017-05-23 NOTE — CARE PLAN
Problem: Pain Management  Goal: Pain level will decrease to patient’s comfort goal  Outcome: PROGRESSING AS EXPECTED    Problem: Bowel/Gastric:  Goal: Normal bowel function is maintained or improved  Outcome: PROGRESSING AS EXPECTED

## 2017-05-23 NOTE — PROGRESS NOTES
Patient down for MRI this morning. Pain medication added PRN from [facility] team. Patient states her headache is much better. Patient can walk to bathroom, no episodes of incontinence today.

## 2017-05-23 NOTE — SENIOR ADMIT NOTE
HPI:  Pt is a 57 y/o with hx of migranes,Multiple sclerosis diagnosed at age 32 presents as a transfer from [facility] for MS flare and elevated ESR.  Pt states her flare started on Thursday last week with lower extremity weakness and on Friday she started having unilateral left sided  temporal headache with gradual worsening and yesterday  she lost  Control of bladder which prompted the visit to ED.  Pt states she has episodes of unilateral electric shock-like stabbing pains, abrupt in onset mostly in the temporal area , denies any scalp tenderness, pain on palpation of temporal area, facial spasms, lacrimation, conjunctival injection, and rhinorrhea ,Denies any vision loss, jaw claudication ,shoulder pain,fevers.  Denies any sob, CP,abd pain, NVD, dysuria    At [facility] pt was found to have an elevated ESR of 140 therefore she was transferred here for higher level of care. P was given IV dilaudid,5mg valium, 30mg toradol and 4mg zofran at [facility]    Pt states  she hasn't had a flare in last 10 years. Pt followed with [facility] neurologists up until last year until she moved to [facility] . Pt states she develops tachyarrhytmias at higher doses of steroids  aroung 1000mg and above.    In the ED pt was found to be afebrile,vital stable, saturating well on RA .    meds given 100mg fentany and 1mg IV dilaudid, Zofran and 125mg solumedrol.  Persistent pain even after fentanyl, dilaudid        Exam:on exam AAOX4,no tenderness on palpation of scalp, temporal area, face, jaw  No focal deficits noted,no weakness,  LE reflexes sligtly brisk b/L    VS: afebrile,/53 HR 72, 98% on RA  Labs: ESR at [facility] 140 now 114, no leukocytosis, CRP 48, UA shows 10-20 wbc, 2-5RBC,trace blood, no LE,nitrite  Imaging:CT head: no acute abnormality  CXR;  Medial RIGHT lung base and greater atelectasis.  No pleural fluid or pneumothorax.    Assessment and plan:    MS flare  Trigeminal neuralgia vs temporal arteritis given  presentation  Hasn't had any flare in 10 yrs, on tecfidera at home for mainatainence,  on darifenacin for urinary incontinence,not on any steroids   consulted neurology as pt has tachyarrhythmia's with high dose steroids, advised to start on 500mg of IV steroids daily for 3 days and f/u with neurolgy in am  Plan  Started on IV solumedrol 500mg for 3days  On cardiac monitor to monitor arrhythmias  q4 neurochecks  Fall, seizure, aspiration precautions  MRI BRAIN and spine ordered  Please obtain records from [place] and reach out to the physician who was taking care of the pt for more information  Please call neurology in am.  Started on carbamazepine for trigeminal neuralgia , monitor LFT's , CBC  restrted home lyricaand tizanidine    Hx of depression  Continue home sertraline  For further information please refer to intern H&P

## 2017-05-23 NOTE — PROGRESS NOTES
Patient arrived via gurney, ambulated to bed with SBA, placed on 2 L of O2 via NC, patient states nausea, medicating per MAR, bed alarm active, call light provided, hourly rounding.

## 2017-05-23 NOTE — PROGRESS NOTES
"                               [facility] Interval Note    Name Justa Segovia       1960   Age/Sex 56 y.o. female   MRN [id number]   Code Status Full     After 5PM or if no immediate response to page, please call for cross-coverage  Attending/Team: Keith Call [phone] to page   1st Call - Day Intern (R1):   Dr. Em 2nd Call - Day Sr. Resident (R2/R3):   Dr. Bosch         Chief complaint/ reason for interval visit (Primary Diagnosis)   Headache    Interval Problem Daily Status Update      Patient continues to complain of severe (10/10 pain) left temporal headache that also radiates behind her left ear. She continues to describe this headache as an \"electric\" stabbing pain, but denies any pain on palpation of the left temporal artery, vision changes, rhinorrhea, lacrimation, or jaw claudication. She also states that she has had some bilateral lower extremity \"heaviness\" and feels like her legs are weaker than baseline. She does have a history of migraines, but states that this does not feel like a migraine. She denies having this type of headache in the past. Patient does have a history of multiple sclerosis and believes that this might be an acute exacerbation of her multiple sclerosis. She continues to complain of N/V and also has had some associated chills w/ diaphoresis. She denies fever, hearing loss, vision changes, congestion, sore throat, chest pain, SOB, abdominal pain, diarrhea, constipation, dysuria, hematuria, or changes in sensation to arms or legs (but does have lower extremity subjective weakness as stated previously).         Active Problems:    Temporal arteritis (CMS-HCC) POA: Yes      Overview: Electric shock like lancinating pains left temporal region      Nauseated vomiting      No visual disturbances      No seizures    Multiple sclerosis exacerbation (CMS-HCC) POA: Unknown      Overview: Pt has hx of MS since the age of 22, started as Seizures " "managed with       Phenobarbital and diagnosed 10 yrs later as MS      Hx of worst episode 12 yrs ago requiring very high doses of Steroids (1200       mg ) * 5days      Was in remission for 10 yrs      Symptoms recurred since Thursday  (May 18th 2017)      Leg cramps, heaviness of both legs, followed by severe headache unilateral       left side temporal region      Bladder control lost since yesterday        Resolved Problems:    * No resolved hospital problems. *      Review of Systems   Constitutional: Positive for chills. Negative for fever, weight loss, malaise/fatigue and diaphoresis.   HENT: Negative for congestion, ear pain, hearing loss, sore throat and tinnitus.    Eyes: Negative for blurred vision, double vision, photophobia and pain.   Respiratory: Negative for cough, sputum production, shortness of breath and wheezing.    Cardiovascular: Negative for chest pain and leg swelling.   Gastrointestinal: Positive for nausea and vomiting. Negative for abdominal pain, diarrhea and constipation.   Genitourinary: Negative for dysuria, urgency, frequency and hematuria.        Incontinence   Musculoskeletal: Positive for myalgias. Negative for back pain, joint pain and neck pain.   Skin: Negative for rash.   Neurological: Positive for headaches. Negative for dizziness, tingling, sensory change, speech change, focal weakness and seizures.   Psychiatric/Behavioral: Positive for depression.       Consultants/Specialty  Neurology.    Disposition  Inpatient for intractable headache.    Quality Measures  EKG reviewed, Labs reviewed, Medications reviewed and Radiology images reviewed  Schmitt catheter: No Schmitt      DVT Prophylaxis: Enoxaparin (Lovenox)    Ulcer prophylaxis: No              Physical Exam       Filed Vitals:    05/23/17 0400 05/23/17 0430 05/23/17 0500 05/23/17 0800   BP:   112/58 102/54   Pulse: 59 57 58 57   Temp:   37 °C (98.6 °F) 36.4 °C (97.5 °F)   Resp:   18 16   Height:   1.575 m (5' 2\")    Weight:   " 73.3 kg (161 lb 9.6 oz)    SpO2: 96% 95% 93% 94%     Body mass index is 29.55 kg/(m^2). Weight: 73.3 kg (161 lb 9.6 oz)  Oxygen Therapy:  Pulse Oximetry: 94 %, O2 (LPM): 2, O2 Delivery: Nasal Cannula    Physical Exam   Constitutional: She is oriented to person, place, and time. She appears distressed.   HENT:   Head: Normocephalic and atraumatic.   Temporal artery palpation is nontender. Pulse is felt.    Eyes: EOM are normal. Pupils are equal, round, and reactive to light. Right eye exhibits no discharge. Left eye exhibits no discharge.   Left eye ptosis.   Neck: Normal range of motion.   Cardiovascular: Normal rate, regular rhythm and normal heart sounds.  Exam reveals no gallop and no friction rub.    No murmur heard.  Pulmonary/Chest: Effort normal and breath sounds normal. No respiratory distress. She has no wheezes.   Abdominal: Soft. Bowel sounds are normal. She exhibits no distension. There is no tenderness.   Musculoskeletal: Normal range of motion.   Lymphadenopathy:     She has no cervical adenopathy.   Neurological: She is alert and oriented to person, place, and time. She has normal reflexes. No cranial nerve deficit.   5/5 strength in bilateral upper and lower extremities. Equal sensation noted in bilateral extremities.    Skin: Skin is warm and dry. No rash noted.         Lab Data Review:      5/23/2017  12:29 PM    Recent Labs      05/22/17 2318 05/23/17   0822   SODIUM  134*   --    POTASSIUM  3.8   --    CHLORIDE  101   --    CO2  21   --    BUN  20   --    CREATININE  1.25   --    MAGNESIUM   --   2.2   CALCIUM  9.2   --        Recent Labs      05/22/17 2318   ALTSGPT  22   ASTSGOT  27   ALKPHOSPHAT  105*   TBILIRUBIN  0.7   GLUCOSE  125*       Recent Labs      05/22/17 2318   RBC  3.75*   HEMOGLOBIN  12.1   HEMATOCRIT  35.7*   PLATELETCT  212       Recent Labs      05/22/17 2318   WBC  6.8   NEUTSPOLYS  77.00*   LYMPHOCYTES  14.00*   MONOCYTES  3.00   EOSINOPHILS  0.00   BASOPHILS  0.00    ASTSGOT  27   ALTSGPT  22   ALKPHOSPHAT  105*   TBILIRUBIN  0.7           Assessment/Plan     1. Headache   Ddx: MS flare vs. Trigeminal neuralgia vs. Temporal arteritis  -Sharp, electric-like left temporal headache of 10/10 severity.  -Positive for chills, diaphoresis, N/V, subjective weakness in lower extremities, and urinary incontinence.  -Denies vision loss, jaw claudication changes, fever, rhinorrhea, or lacrimation. Negative for temporal artery tenderness. Temporal artery pulse is present.   -Denies having this type of HA in past. Hx of migraines.   -Labs--> ESR: 114, CRP: 48, GFR: 45.   -CT head showed no acute abnormalities; CXR: medial right lung base opacity w/ atelectasis  -Unclear at this time on etiology. ESR w/ chills might suggest temporal arteritis, but temporal artery pulse was palpable and non-tender. No associated vision changes were present. Patient's description of 10/10, sharp, electric pain suggests possible trigeminal neuralgia, but patient's subjective history of lower extremity weakness from baseline and urinary incontinence increase could suggest an MS flare (possibly w/ associated trigeminal neuralgia 2/2 demyelination??)  Plan  -Continue IV solu-medrol 500 mg X3 days  -MRI brain and spine pending.  -Will continue carbamazapine for possible trigeminal neuralgia  -Neurology consult is pending and will continue q4 neuro checks.   -Continue fall, seizure, and aspiration precautions.   -Continue patient's home lyrica and tizanidine.   -Continue antiemetic medications as needed.     2. Urinary incontinence  -Continue darifenacin 15 mg tablet for maintenance.     3. Depression  -Continue patient's home sertraline 50 mg qhs PO          Assessment & plan notes cannot be loaded without a specified hospital service.

## 2017-05-23 NOTE — ED PROVIDER NOTES
"ED Provider Note    CHIEF COMPLAINT  Chief Complaint   Patient presents with   • Multiple Sclerosis     Pt was transferred from [facility] d/t abnormal labs. Sed rate >140. Pt states she has not had a MS flare up for approx 10 years. Pt states this flare up started around thursday and progressively has gotten worse. Today has been the worst day and pt states she has had N/V. Pt also having severe head pain.       HPI  Justa Segovia is a 56 y.o. female who presents to the emergency department with a headache. The patient says she's been having sharp shooting pains in the left temporal region that started earlier today. The patient does have a history of migraine headaches. She also has been having increased weakness to her lower extremities that she attributes to a multiple sclerosis flareup. She has not had a flare up in several years. She is not currently on any steroids. The patient also has occasional pain in her lower extremities. She does not have any associated fevers. The patient was evaluated at [facility] and had a significant elevation in her ESR and therefore she was transferred here for higher level of care. The patient does not have any reproducible discomfort in the left temporal region.    REVIEW OF SYSTEMS  See HPI for further details. All other systems are negative.     PAST MEDICAL HISTORY  No past medical history on file.    SOCIAL HISTORY  Social History     Social History   • Marital Status: N/A     Spouse Name: N/A   • Number of Children: N/A   • Years of Education: N/A     Social History Main Topics   • Smoking status: Not on file   • Smokeless tobacco: Not on file   • Alcohol Use: Not on file   • Drug Use: Not on file   • Sexual Activity: Not on file     Other Topics Concern   • Not on file     Social History Narrative   • No narrative on file           PHYSICAL EXAM  VITAL SIGNS: BP 91/53 mmHg  Pulse 79  Resp 20  Ht 1.575 m (5' 2\")  Wt 68.04 kg (150 lb)  BMI 27.43 kg/m2  " SpO2 96%  Constitutional: Mild acute distress, Non-toxic appearance.   HENT: Normocephalic, Atraumatic, tympanic membranes are intact and nonerythematous bilaterally, Oropharynx moist without exudates or erythema, Nose normal.   Eyes: PERRLA, EOMI, Conjunctiva normal.  Neck: Supple without meningismus  Lymphatic: No lymphadenopathy noted.   Cardiovascular: Normal heart rate, Normal rhythm, No murmurs, No rubs, No gallops.   Thorax & Lungs: Normal breath sounds, No respiratory distress, No wheezing, No chest tenderness.   Abdomen: Bowel sounds normal, Soft, No tenderness, no rebound, no guarding, no distention, No masses, No pulsatile masses.   Skin: Warm, Dry, No erythema, No rash.   Back: No tenderness, No CVA tenderness.   Extremities: Atraumatic with symmetric distal pulses, No edema, No tenderness, No cyanosis, No clubbing.   Neurologic: Alert & oriented x 3, cranial nerves II through XII are intact, Normal motor function, Normal sensory function, No focal deficits noted.   Psychiatric: Affect normal, Judgment normal, Mood normal.       COURSE & MEDICAL DECISION MAKING  Pertinent Labs & Imaging studies reviewed. (See chart for details)  This a 56-year-old female who presents to the emergency department as a transfer for suspected MS exacerbation. As for her headache the patient does not have any reproducible discomfort and do not suspect she has temporal arteritis. This cannot be fully excluded. The patient will receive IV steroids for acute inflammatory control for her MS exacerbation. I will perform a CT scan of her head makes there is no evidence of intracranial pathology. The patient will be admitted to the hospital for further workup and neurologic consultation as needed.    FINAL IMPRESSION  1. Headache  2. MS exacerbation     Disposition  The patient will be discharged in stable condition    Electronically signed by: Augustus Avelar, 5/22/2017 11:29 PM

## 2017-05-23 NOTE — PROGRESS NOTES
[facility] [place] Internal Medicine Interval Note    Name Justa Segovia       1960   Age/Sex 56 y.o. female   MRN [id number]   Code Status Full     After 5PM or if no immediate response to page, please call for cross-coverage  Attending/Team:  / Preston  Call [phone] to page   1st Call - Day Intern (R1):   Dr. Em 2nd Call - Day Sr. Resident (R2/R3):            Chief complaint/ reason for interval visit (Primary Diagnosis)   56 Y F admitted with Trigeminal Neuralgia     Interval Problem Daily Status Update    Active Problems:    Trigeminal Neuralgia  (CMS-HCC) POA: Yes       Overview: Electric shock like lancinating pains left temporal region      Nauseated vomiting      No visual disturbances      No seizures    Multiple sclerosis exacerbation (CMS-HCC) POA: Unknown      Overview: Pt has hx of MS since the age of 22, started as Seizures managed with       Phenobarbital and diagnosed 10 yrs later as MS      Hx of worst episode 12 yrs ago requiring very high doses of Steroids (1200       mg ) * 5days      Was in remission for 10 yrs      Symptoms recurred since Thursday  (May 18th 2017)      Leg cramps, heaviness of both legs, followed by severe headache unilateral       left side temporal region      Bladder control lost since     Interval update  Pt examined by me on bedside. She feels her headache is much better after she received Dilaudid.  C/O heaviness in both lower extremities and slight drooping in the Left upper eyelid.     - As per Tele Neurology Consult recommendations in the night , she is possibly thought to be having Trigeminal neuralgia and started on Solumedrol 500 mg QD and Carbamazepine 200 mg QD. MRI was ordered for ruling out possible MS flare , we do not have previous images to compare.    - Spoke to the pt who is on Tecfidera 240 mg BID for MS being refilled by her Neurologist Dr.Lesley Blanton at [facility] whom she last  "visited 2 months back.    - IP Neurology consult placed by us for further recommendations , spoke to  who agreed to see the pt today.            Review of Systems   Constitutional: Positive for malaise/fatigue.   Eyes: Negative for blurred vision and pain.   Respiratory: Negative for cough and shortness of breath.    Cardiovascular: Negative for chest pain, palpitations and leg swelling.   Gastrointestinal: Positive for nausea.   Genitourinary: Positive for frequency.   Musculoskeletal: Positive for myalgias. Negative for falls.   Neurological: Positive for headaches. Negative for dizziness, tremors, focal weakness, seizures and loss of consciousness.       Consultants/Specialty  Neurology :     Disposition  Inpatient management of Trigeminal Neuralgia    Quality Measures  EKG reviewed, Labs reviewed, Medications reviewed and Radiology images reviewed  Schmitt catheter: No Schmitt      DVT Prophylaxis: Enoxaparin (Lovenox)    Ulcer prophylaxis: Not indicated        Physical Exam       Filed Vitals:    05/23/17 0430 05/23/17 0500 05/23/17 0800 05/23/17 1400   BP:  112/58 102/54 111/63   Pulse: 57 58 57 57   Temp:  37 °C (98.6 °F) 36.4 °C (97.5 °F) 36.6 °C (97.8 °F)   Resp:  18 16 17   Height:  1.575 m (5' 2\")     Weight:  73.3 kg (161 lb 9.6 oz)     SpO2: 95% 93% 94% 96%     Body mass index is 29.55 kg/(m^2). Weight: 73.3 kg (161 lb 9.6 oz)  Oxygen Therapy:  Pulse Oximetry: 96 %, O2 (LPM): 2, O2 Delivery: Nasal Cannula    Physical Exam   Constitutional: She is oriented to person, place, and time and well-developed, well-nourished, and in no distress. No distress.   HENT:   Head: Normocephalic and atraumatic.   Eyes: EOM are normal. Pupils are equal, round, and reactive to light. Right eye exhibits no discharge. Left eye exhibits no discharge.   Neck: Normal range of motion. No thyromegaly present.   Cardiovascular: Normal rate and regular rhythm.  Exam reveals no gallop and no friction rub.    No " murmur heard.  Pulmonary/Chest: No respiratory distress. She has no rales.   Abdominal: Soft. Bowel sounds are normal. She exhibits no distension. There is no rebound.   Musculoskeletal: She exhibits no edema or tenderness.   Neurological: She is alert and oriented to person, place, and time. No cranial nerve deficit. Coordination normal.   Slight drooping of left upper eyelid   Left temporal.A nontender.  Motor : 5/5 in all extremities.  Sensations intact.  Gait not tested     Skin: Skin is warm and dry. No rash noted. She is not diaphoretic. No erythema.         Lab Data Review:    5/23/2017  3:38 PM    Recent Labs      05/22/17 2318 05/23/17   0822   SODIUM  134*   --    POTASSIUM  3.8   --    CHLORIDE  101   --    CO2  21   --    BUN  20   --    CREATININE  1.25   --    MAGNESIUM   --   2.2   CALCIUM  9.2   --        Recent Labs      05/22/17 2318   ALTSGPT  22   ASTSGOT  27   ALKPHOSPHAT  105*   TBILIRUBIN  0.7   GLUCOSE  125*       Recent Labs      05/22/17 2318   RBC  3.75*   HEMOGLOBIN  12.1   HEMATOCRIT  35.7*   PLATELETCT  212       Recent Labs      05/22/17 2318   WBC  6.8   NEUTSPOLYS  77.00*   LYMPHOCYTES  14.00*   MONOCYTES  3.00   EOSINOPHILS  0.00   BASOPHILS  0.00   ASTSGOT  27   ALTSGPT  22   ALKPHOSPHAT  105*   TBILIRUBIN  0.7     IMAGING   MR-LUMBAR SPINE-WITH & W/O   Final Result      1.  There are tiny focal intramedullary T2 signal intensities in the lower end of the thoracic spinal cord likely representing chronic demyelinating plaques of multiple sclerosis.   2.  Mild chronic compression deformity at L1.      MR-CERVICAL SPINE-WITH & W/O   Final Result      1.  Abnormal intramedullary T2 signal intensity in the cervical spinal cord likely representing chronic demyelinating plaques of multiple sclerosis. None of the lesions demonstrates contrast enhancement.   2.  Minimal degenerative disease as described above.      MR-BRAIN-WITH & W/O   Final Result      1.  Multifocal abnormal T2  hyperintensities in the subcortical and periventricular white matter. None of the lesions demonstrates contrast enhancement. In view of history of multiple sclerosis this findings likely represents chronic demyelinating plaques    of multiple sclerosis.   2.  Mild cerebral atrophy.      DX-CHEST-PORTABLE (1 VIEW)   Final Result      1.  Medial RIGHT lung base and greater atelectasis.   2.  No pleural fluid or pneumothorax.      CT-HEAD W/O   Final Result      No acute intracranial abnormality.          Assessment/Plan       1. Unilateral Left Temporal Headache due to Trigeminal neuralgia       Possible MS flare to be Ruled out   -  Bilateral Lower Extremity Cramps and heaviness , Left upper eyelid droopiness.  -  Elevated  ( 140 at [facility] ) , CRP 48   -  CT Head : No acute changes.  -  Hx of MS last flare 10 yrs back, follows [facility] Neurology Dr.Lesley Blanton and on home medication   -  Tele Neurology Consulted at admission who recomended to start on Carbamazepine and IV Solumedrol  -   MRI  Lumbar Spine/Thoracic spine /Brain  : Positive for Chronic Demyelinating plaques of MS.  PLAN  - Will continue Carbamazepine 200mg QD  - Solumedrol 500 mg IV QD  - Inpatient Neurology Consult  on board , appreciate his recommendations.  - Pain management with PRN Oxycodone.      Chronic Issues  2. Hx of MS   - Presented as Seizures at 22 yrs   - In remission since 10 yrs , continuing medication Tecfidera 240 mg BID   - Follows Neurologist at [facility]  3.  Incontinence of Urine  -   On home medication Darifenacin 15 mg SR , not well controlled x 2 days  4. Hx of Depression  - On Sertraline  5. ?Arrhythmias - No records available.   On Metoprolol at home - Held for now due to Bradycardia ; EKG this admission - Sinus bradycardia  6. DLD - On Pravastatin 20 mg QD

## 2017-05-24 ENCOUNTER — APPOINTMENT (OUTPATIENT)
Dept: RADIOLOGY | Facility: MEDICAL CENTER | Age: 57
DRG: 040 | End: 2017-05-24
Attending: STUDENT IN AN ORGANIZED HEALTH CARE EDUCATION/TRAINING PROGRAM
Payer: MEDICARE

## 2017-05-24 PROBLEM — R51.9 UNILATERAL HEADACHE: Status: ACTIVE | Noted: 2017-05-24

## 2017-05-24 PROBLEM — R50.9 FEVER: Status: ACTIVE | Noted: 2017-05-24

## 2017-05-24 PROBLEM — F32.A DEPRESSION: Status: ACTIVE | Noted: 2017-05-24

## 2017-05-24 PROBLEM — Z86.79 HISTORY OF CARDIAC ARRHYTHMIA: Status: ACTIVE | Noted: 2017-05-24

## 2017-05-24 PROBLEM — E78.5 HYPERLIPIDEMIA: Status: ACTIVE | Noted: 2017-05-24

## 2017-05-24 PROBLEM — R73.9 HYPERGLYCEMIA: Status: ACTIVE | Noted: 2017-05-24

## 2017-05-24 LAB
ALBUMIN SERPL BCP-MCNC: 3 G/DL (ref 3.2–4.9)
ALBUMIN/GLOB SERPL: 1 G/DL
ALP SERPL-CCNC: 115 U/L (ref 30–99)
ALT SERPL-CCNC: 41 U/L (ref 2–50)
ANION GAP SERPL CALC-SCNC: 9 MMOL/L (ref 0–11.9)
ANISOCYTOSIS BLD QL SMEAR: ABNORMAL
APPEARANCE UR: CLEAR
AST SERPL-CCNC: 49 U/L (ref 12–45)
BASOPHILS # BLD AUTO: 0 % (ref 0–1.8)
BASOPHILS # BLD: 0 K/UL (ref 0–0.12)
BILIRUB SERPL-MCNC: 0.4 MG/DL (ref 0.1–1.5)
BILIRUB UR QL STRIP.AUTO: NEGATIVE
BUN SERPL-MCNC: 14 MG/DL (ref 8–22)
CALCIUM SERPL-MCNC: 9 MG/DL (ref 8.5–10.5)
CHLORIDE SERPL-SCNC: 102 MMOL/L (ref 96–112)
CO2 SERPL-SCNC: 19 MMOL/L (ref 20–33)
COLOR UR: YELLOW
CREAT SERPL-MCNC: 0.84 MG/DL (ref 0.5–1.4)
EOSINOPHIL # BLD AUTO: 0 K/UL (ref 0–0.51)
EOSINOPHIL NFR BLD: 0 % (ref 0–6.9)
EPI CELLS #/AREA URNS HPF: NORMAL /HPF
ERYTHROCYTE [DISTWIDTH] IN BLOOD BY AUTOMATED COUNT: 45.1 FL (ref 35.9–50)
ERYTHROCYTE [DISTWIDTH] IN BLOOD BY AUTOMATED COUNT: 46.6 FL (ref 35.9–50)
ERYTHROCYTE [SEDIMENTATION RATE] IN BLOOD BY WESTERGREN METHOD: 114 MM/HOUR (ref 0–30)
GFR SERPL CREATININE-BSD FRML MDRD: >60 ML/MIN/1.73 M 2
GLOBULIN SER CALC-MCNC: 3.1 G/DL (ref 1.9–3.5)
GLUCOSE BLD-MCNC: 150 MG/DL (ref 65–99)
GLUCOSE BLD-MCNC: 193 MG/DL (ref 65–99)
GLUCOSE SERPL-MCNC: 201 MG/DL (ref 65–99)
GLUCOSE UR STRIP.AUTO-MCNC: NEGATIVE MG/DL
HCT VFR BLD AUTO: 30.3 % (ref 37–47)
HCT VFR BLD AUTO: 32 % (ref 37–47)
HGB BLD-MCNC: 10.4 G/DL (ref 12–16)
HGB BLD-MCNC: 10.6 G/DL (ref 12–16)
KETONES UR STRIP.AUTO-MCNC: NEGATIVE MG/DL
LEUKOCYTE ESTERASE UR QL STRIP.AUTO: NEGATIVE
LYMPHOCYTES # BLD AUTO: 1.42 K/UL (ref 1–4.8)
LYMPHOCYTES NFR BLD: 12.2 % (ref 22–41)
MANUAL DIFF BLD: NORMAL
MCH RBC QN AUTO: 32.5 PG (ref 27–33)
MCH RBC QN AUTO: 32.7 PG (ref 27–33)
MCHC RBC AUTO-ENTMCNC: 33.1 G/DL (ref 33.6–35)
MCHC RBC AUTO-ENTMCNC: 34.3 G/DL (ref 33.6–35)
MCV RBC AUTO: 95.3 FL (ref 81.4–97.8)
MCV RBC AUTO: 98.2 FL (ref 81.4–97.8)
METAMYELOCYTES NFR BLD MANUAL: 0.9 %
MICRO URNS: ABNORMAL
MICROCYTES BLD QL SMEAR: ABNORMAL
MONOCYTES # BLD AUTO: 0.09 K/UL (ref 0–0.85)
MONOCYTES NFR BLD AUTO: 0.8 % (ref 0–13.4)
MORPHOLOGY BLD-IMP: NORMAL
NEUTROPHILS # BLD AUTO: 9.99 K/UL (ref 2–7.15)
NEUTROPHILS NFR BLD: 86.1 % (ref 44–72)
NITRITE UR QL STRIP.AUTO: NEGATIVE
NRBC # BLD AUTO: 0 K/UL
NRBC BLD AUTO-RTO: 0 /100 WBC
PH UR STRIP.AUTO: 6.5 [PH]
PLATELET # BLD AUTO: 210 K/UL (ref 164–446)
PLATELET # BLD AUTO: 220 K/UL (ref 164–446)
PLATELET BLD QL SMEAR: NORMAL
PMV BLD AUTO: 10.8 FL (ref 9–12.9)
PMV BLD AUTO: 11.1 FL (ref 9–12.9)
POTASSIUM SERPL-SCNC: 4.5 MMOL/L (ref 3.6–5.5)
PROT SERPL-MCNC: 6.1 G/DL (ref 6–8.2)
PROT UR QL STRIP: 30 MG/DL
RBC # BLD AUTO: 3.18 M/UL (ref 4.2–5.4)
RBC # BLD AUTO: 3.26 M/UL (ref 4.2–5.4)
RBC BLD AUTO: PRESENT
RBC UR QL AUTO: NEGATIVE
SODIUM SERPL-SCNC: 130 MMOL/L (ref 135–145)
SP GR UR STRIP.AUTO: 1.01
WBC # BLD AUTO: 11.6 K/UL (ref 4.8–10.8)
WBC # BLD AUTO: 8.6 K/UL (ref 4.8–10.8)
WBC #/AREA URNS HPF: NORMAL /HPF

## 2017-05-24 PROCEDURE — 700102 HCHG RX REV CODE 250 W/ 637 OVERRIDE(OP): Performed by: INTERNAL MEDICINE

## 2017-05-24 PROCEDURE — 700105 HCHG RX REV CODE 258: Performed by: PSYCHIATRY & NEUROLOGY

## 2017-05-24 PROCEDURE — 85652 RBC SED RATE AUTOMATED: CPT

## 2017-05-24 PROCEDURE — A9270 NON-COVERED ITEM OR SERVICE: HCPCS | Performed by: PSYCHIATRY & NEUROLOGY

## 2017-05-24 PROCEDURE — A9270 NON-COVERED ITEM OR SERVICE: HCPCS | Performed by: INTERNAL MEDICINE

## 2017-05-24 PROCEDURE — 700111 HCHG RX REV CODE 636 W/ 250 OVERRIDE (IP): Performed by: PSYCHIATRY & NEUROLOGY

## 2017-05-24 PROCEDURE — 87040 BLOOD CULTURE FOR BACTERIA: CPT

## 2017-05-24 PROCEDURE — 36415 COLL VENOUS BLD VENIPUNCTURE: CPT

## 2017-05-24 PROCEDURE — 85027 COMPLETE CBC AUTOMATED: CPT

## 2017-05-24 PROCEDURE — 81001 URINALYSIS AUTO W/SCOPE: CPT

## 2017-05-24 PROCEDURE — 80053 COMPREHEN METABOLIC PANEL: CPT

## 2017-05-24 PROCEDURE — 71010 DX-CHEST-LIMITED (1 VIEW): CPT

## 2017-05-24 PROCEDURE — A9270 NON-COVERED ITEM OR SERVICE: HCPCS | Performed by: STUDENT IN AN ORGANIZED HEALTH CARE EDUCATION/TRAINING PROGRAM

## 2017-05-24 PROCEDURE — 82962 GLUCOSE BLOOD TEST: CPT | Mod: 91

## 2017-05-24 PROCEDURE — 700105 HCHG RX REV CODE 258: Performed by: STUDENT IN AN ORGANIZED HEALTH CARE EDUCATION/TRAINING PROGRAM

## 2017-05-24 PROCEDURE — 99233 SBSQ HOSP IP/OBS HIGH 50: CPT | Mod: GC | Performed by: INTERNAL MEDICINE

## 2017-05-24 PROCEDURE — 700102 HCHG RX REV CODE 250 W/ 637 OVERRIDE(OP): Performed by: PSYCHIATRY & NEUROLOGY

## 2017-05-24 PROCEDURE — 700111 HCHG RX REV CODE 636 W/ 250 OVERRIDE (IP): Performed by: STUDENT IN AN ORGANIZED HEALTH CARE EDUCATION/TRAINING PROGRAM

## 2017-05-24 PROCEDURE — 700102 HCHG RX REV CODE 250 W/ 637 OVERRIDE(OP): Performed by: STUDENT IN AN ORGANIZED HEALTH CARE EDUCATION/TRAINING PROGRAM

## 2017-05-24 PROCEDURE — 770020 HCHG ROOM/CARE - TELE (206)

## 2017-05-24 PROCEDURE — 700105 HCHG RX REV CODE 258: Performed by: INTERNAL MEDICINE

## 2017-05-24 PROCEDURE — 85007 BL SMEAR W/DIFF WBC COUNT: CPT

## 2017-05-24 PROCEDURE — 700111 HCHG RX REV CODE 636 W/ 250 OVERRIDE (IP): Performed by: INTERNAL MEDICINE

## 2017-05-24 RX ORDER — ACETAMINOPHEN 500 MG
500 TABLET ORAL EVERY 6 HOURS PRN
Status: DISCONTINUED | OUTPATIENT
Start: 2017-05-24 | End: 2017-05-25 | Stop reason: ALTCHOICE

## 2017-05-24 RX ORDER — OXYCODONE HYDROCHLORIDE AND ACETAMINOPHEN 5; 325 MG/1; MG/1
2 TABLET ORAL EVERY 4 HOURS PRN
Status: DISCONTINUED | OUTPATIENT
Start: 2017-05-24 | End: 2017-05-25

## 2017-05-24 RX ORDER — DOXYCYCLINE 100 MG/1
100 TABLET ORAL EVERY 12 HOURS
Status: DISCONTINUED | OUTPATIENT
Start: 2017-05-24 | End: 2017-05-25

## 2017-05-24 RX ADMIN — DOXYCYCLINE 100 MG: 100 TABLET ORAL at 16:51

## 2017-05-24 RX ADMIN — ACETAMINOPHEN 500 MG: 500 TABLET ORAL at 20:43

## 2017-05-24 RX ADMIN — TIZANIDINE 2 MG: 4 TABLET ORAL at 20:44

## 2017-05-24 RX ADMIN — SODIUM CHLORIDE: 9 INJECTION, SOLUTION INTRAVENOUS at 02:55

## 2017-05-24 RX ADMIN — STANDARDIZED SENNA CONCENTRATE AND DOCUSATE SODIUM 2 TABLET: 8.6; 5 TABLET, FILM COATED ORAL at 08:14

## 2017-05-24 RX ADMIN — ENOXAPARIN SODIUM 40 MG: 100 INJECTION SUBCUTANEOUS at 08:15

## 2017-05-24 RX ADMIN — METOPROLOL TARTRATE 25 MG: 25 TABLET, FILM COATED ORAL at 08:16

## 2017-05-24 RX ADMIN — OXYCODONE HYDROCHLORIDE AND ACETAMINOPHEN 2 TABLET: 5; 325 TABLET ORAL at 18:23

## 2017-05-24 RX ADMIN — TIZANIDINE 2 MG: 4 TABLET ORAL at 08:15

## 2017-05-24 RX ADMIN — SODIUM CHLORIDE 500 MG: 9 INJECTION, SOLUTION INTRAVENOUS at 08:21

## 2017-05-24 RX ADMIN — CARBAMAZEPINE 200 MG: 200 TABLET ORAL at 23:01

## 2017-05-24 RX ADMIN — PREGABALIN 75 MG: 75 CAPSULE ORAL at 08:16

## 2017-05-24 RX ADMIN — PRAVASTATIN SODIUM 40 MG: 20 TABLET ORAL at 20:43

## 2017-05-24 RX ADMIN — OXYCODONE HYDROCHLORIDE AND ACETAMINOPHEN 1 TABLET: 5; 325 TABLET ORAL at 10:46

## 2017-05-24 RX ADMIN — METOPROLOL TARTRATE 25 MG: 25 TABLET, FILM COATED ORAL at 20:43

## 2017-05-24 RX ADMIN — CARBAMAZEPINE 200 MG: 200 TABLET ORAL at 08:15

## 2017-05-24 RX ADMIN — OXYCODONE HYDROCHLORIDE AND ACETAMINOPHEN 2 TABLET: 5; 325 TABLET ORAL at 23:00

## 2017-05-24 RX ADMIN — SERTRALINE 50 MG: 50 TABLET, FILM COATED ORAL at 20:44

## 2017-05-24 RX ADMIN — INSULIN LISPRO 2 UNITS: 100 INJECTION, SOLUTION INTRAVENOUS; SUBCUTANEOUS at 17:07

## 2017-05-24 RX ADMIN — CEFTRIAXONE SODIUM 1 G: 1 INJECTION, POWDER, FOR SOLUTION INTRAMUSCULAR; INTRAVENOUS at 17:04

## 2017-05-24 RX ADMIN — OXYCODONE HYDROCHLORIDE AND ACETAMINOPHEN 1 TABLET: 5; 325 TABLET ORAL at 07:03

## 2017-05-24 RX ADMIN — STANDARDIZED SENNA CONCENTRATE AND DOCUSATE SODIUM 2 TABLET: 8.6; 5 TABLET, FILM COATED ORAL at 20:43

## 2017-05-24 RX ADMIN — OXYCODONE HYDROCHLORIDE AND ACETAMINOPHEN 1 TABLET: 5; 325 TABLET ORAL at 02:52

## 2017-05-24 RX ADMIN — SODIUM CHLORIDE 500 MG: 9 INJECTION, SOLUTION INTRAVENOUS at 20:49

## 2017-05-24 RX ADMIN — PREGABALIN 75 MG: 75 CAPSULE ORAL at 20:43

## 2017-05-24 RX ADMIN — MAGNESIUM HYDROXIDE 30 ML: 400 SUSPENSION ORAL at 08:25

## 2017-05-24 RX ADMIN — OXYBUTYNIN CHLORIDE 10 MG: 10 TABLET, FILM COATED, EXTENDED RELEASE ORAL at 23:01

## 2017-05-24 RX ADMIN — OXYCODONE HYDROCHLORIDE AND ACETAMINOPHEN 1 TABLET: 5; 325 TABLET ORAL at 14:31

## 2017-05-24 ASSESSMENT — ENCOUNTER SYMPTOMS
NAUSEA: 0
SEIZURES: 0
VOMITING: 0
BLURRED VISION: 0
EYE PAIN: 0
HEADACHES: 1
DIZZINESS: 0
PSYCHIATRIC NEGATIVE: 1
MYALGIAS: 0
FEVER: 1
HEMOPTYSIS: 0
HEARTBURN: 0
TINGLING: 0
DOUBLE VISION: 0
CHILLS: 1
WHEEZING: 1
SORE THROAT: 0
LOSS OF CONSCIOUSNESS: 0
PHOTOPHOBIA: 0
SHORTNESS OF BREATH: 1
COUGH: 1
TREMORS: 0
BACK PAIN: 0
ABDOMINAL PAIN: 0
CHILLS: 0
FOCAL WEAKNESS: 0
COUGH: 0
WEAKNESS: 1
SENSORY CHANGE: 0
SPUTUM PRODUCTION: 0
NECK PAIN: 0
WEIGHT LOSS: 0
CONSTIPATION: 0
DEPRESSION: 1
DIAPHORESIS: 1
DIARRHEA: 0
PALPITATIONS: 0
SPEECH CHANGE: 0
MUSCULOSKELETAL NEGATIVE: 1

## 2017-05-24 ASSESSMENT — PAIN SCALES - GENERAL
PAINLEVEL_OUTOF10: 2
PAINLEVEL_OUTOF10: 3
PAINLEVEL_OUTOF10: 4
PAINLEVEL_OUTOF10: 4
PAINLEVEL_OUTOF10: 0

## 2017-05-24 NOTE — PROGRESS NOTES
The Medication Reconciliation process has been completed by interviewing the patient who had a list, her family is bringing in her Tecfidera and Darifenacin (failed ditropan/vesicare) for use.       Allergies have been reviewed  Antibiotic use in 30 days - NONE    Home Pharmacy:  [facility]

## 2017-05-24 NOTE — ASSESSMENT & PLAN NOTE
- x2 febrile recording 101.1, 102.4 >> currently afebrile  - CXR showed bilateral opacities, Urinalysis negative for infection  - Cont RT protocol, IV CTX

## 2017-05-24 NOTE — ASSESSMENT & PLAN NOTE
- Glucose on admission 125; 5/24 >> 201  - Likely exacerbated by IV Steroids  - ISS, Hypoglycemic protocol, POC

## 2017-05-24 NOTE — PROGRESS NOTES
Patient c/o discomfort at IV access site; no swelling, redness, or burning. Patient states the angle is awkward with movement. New site established, documented, and now being used for IVFs. Patient reports new site is much better.

## 2017-05-24 NOTE — PROGRESS NOTES
NEUROLOGY PROGRESS NOTE      Background:  56 y.o. female was admitted on 5/22/2017 11:07 PM for Multiple sclerosis exacerbation (CMS-HCC)  Temporal arteritis (CMS-HCC).  She is being followed by Neurology for Possible multiple sclerosis flare versus temporal    arteritis versus trigeminal neuralgia.    SUBJECTIVE: Doing well now but this morning had several episode of sharp stabbing pain. Had an episode of urinary incontinence this AM.    NEUROLOGICAL EXAM:   She is awake, alert, fully oriented.     Facial motor and sensation intact.  Extraocular movements are full.  Visual    fields are full to confrontation.  Funduscopic exam with sharp disks    bilaterally, is intact to finger rub bilaterally.  Tongue in midline and    protrudes symmetrically.  Palate elevate symmetrical.  Shoulder shrugs are normal.     Motor examination revealed normal strength to direct testing of both upper and   lower extremity, proximal and distal sensation intact to light perception and   pinprick.  Coordination intact to finger-to-nose and heel-to-shin testing.     Deep tendon reflexes are exaggerated in lower extremity, 3+ in right patellar,   2+ in right Achilles tendon.  She has 3+ reflexes in the left patella with a    3-4 beats of clonus.  She has 3+ reflexes in the left Achilles tendon.     Reflexes are 2+ in brachioradialis.  Plantar reflexes are equivocal.    OBJECTIVE:     NEUROIMAGING:    Brain MRI(5/23):  1.  Multifocal abnormal T2 hyperintensities in the subcortical and periventricular white matter. None of the lesions demonstrates contrast enhancement. In view of history of multiple sclerosis this findings likely represents chronic demyelinating plaques   of multiple sclerosis.  2.  Mild cerebral atrophy.       Cervical spine MRI:(5/23):  1.  Abnormal intramedullary T2 signal intensity in the cervical spinal cord likely representing chronic demyelinating plaques of multiple sclerosis. None of the lesions demonstrates contrast  "enhancement.  2.  Minimal degenerative disease as described above.      MEDICATIONS:  Current Facility-Administered Medications   Medication Dose   • insulin lispro (HUMALOG) injection 2-9 Units  2-9 Units   • methylPREDNISolone sod succ (SOLU-MEDROL) 500 mg in  mL IVPB  500 mg   • senna-docusate (PERICOLACE or SENOKOT S) 8.6-50 MG per tablet 2 Tab  2 Tab    And   • polyethylene glycol/lytes (MIRALAX) PACKET 1 Packet  1 Packet    And   • magnesium hydroxide (MILK OF MAGNESIA) suspension 30 mL  30 mL    And   • bisacodyl (DULCOLAX) suppository 10 mg  10 mg   • Respiratory Care per Protocol     • NS infusion     • enoxaparin (LOVENOX) inj 40 mg  40 mg   • pregabalin (LYRICA) capsule 75 mg  75 mg   • sertraline (ZOLOFT) tablet 50 mg  50 mg   • tizanidine (ZANAFLEX) tablet 2 mg  2 mg   • oxybutynin SR (DITROPAN-XL) tablet 10 mg  10 mg   • ondansetron (ZOFRAN) syringe/vial injection 4 mg  4 mg   • ondansetron (ZOFRAN ODT) dispertab 4 mg  4 mg   • promethazine (PHENERGAN) tablet 12.5-25 mg  12.5-25 mg   • promethazine (PHENERGAN) suppository 12.5-25 mg  12.5-25 mg   • prochlorperazine (COMPAZINE) injection 5-10 mg  5-10 mg   • pravastatin (PRAVACHOL) tablet 40 mg  40 mg   • oxycodone-acetaminophen (PERCOCET) 5-325 MG per tablet 1 Tab  1 Tab   • carbamazepine (TEGRETOL) tablet 200 mg  200 mg   • metoprolol (LOPRESSOR) tablet 25 mg  25 mg       Blood pressure 130/61, pulse 67, temperature 39.1 °C (102.4 °F), resp. rate 25, height 1.575 m (5' 2\"), weight 73.3 kg (161 lb 9.6 oz), SpO2 92 %, not currently breastfeeding.        Recent Labs      05/22/17   2318  05/24/17   0247   WBC  6.8  8.6   RBC  3.75*  3.18*   HEMOGLOBIN  12.1  10.4*   HEMATOCRIT  35.7*  30.3*   MCV  95.2  95.3   MCH  32.3  32.7   MCHC  33.9  34.3   RDW  43.2  45.1   PLATELETCT  212  210   MPV  11.7  11.1     Recent Labs      05/22/17   2318  05/24/17   0247   SODIUM  134*  130*   POTASSIUM  3.8  4.5   CHLORIDE  101  102   CO2  21  19*   GLUCOSE  125* "  201*   BUN  20  14       No results found for this or any previous visit.     ASSESSMENT AND PLAN:  A 56-year-old female with history of multiple sclerosis    who presented with lower ext weakness and bladder incontinence with possible MS exacerbation, she will con't with Solu-Medrol 500 mg twice daily X 3 days.   She also has left-sided headache as well as episodes of sharp    shooting, stabbing pain in the distribution of all 3 divisions of trigeminal    Nerve. DDX: trigeminal neuralgia vs temporal arteritis.She had a high sed rate of 140 at a [facility] and here her sed rate was 114 which is concerning for temporal arteritis.     If she continues with a left-sided headache and there is no other etiology or infectious process to explain her high sed rate, would consider temporal artery biopsy.  She will continue with Tecfidera.  She will continue with carbamazepine and other pain meds as well as steroids.

## 2017-05-24 NOTE — ASSESSMENT & PLAN NOTE
- Diagnosed 34 years ago and followed by [facility] (Dr. Asmita Pollard)  - Has had hx of flare requiring high dose IV Steroids x5 days, chronic urinary incontinence  - Neuro (Dr. Cosme) consulted >> IV Solumedrol 500mg BID, Tecfidera  - Cont Oxybutynin for incontinence

## 2017-05-24 NOTE — ASSESSMENT & PLAN NOTE
- New onset, localized primarily to LEFT fronto-temporal region, mod-severe, sharp, intermittent  - Presentation on admission was worrisome for temporal arteritis vs other neurogenic etiology  - Initial work-up:  (140 at [facility]), CRP 48    - CT Head: No acute intracranial abnormality  - MRI C/L-spine/brain: Evidence chronic demyelinating plaques of MS  - Neuro (Dr. Cosme) following >> cannot exclude Temporal Arteritis, but will continue current treatment for presumed Trigeminal Neuralgia.   - Cont Carbamazepine 200mg BID, Oxy 2.5 Q4 PRN  - Repeat ESR >> 115  - Consult Vascular surgery

## 2017-05-24 NOTE — ASSESSMENT & PLAN NOTE
- No evidence of current abnormality  - EKG on admission sinus haylee, HR 58, QTc 448, stable on overnight tele  - Can cont home Metoprolol 25mg BID (with holding parameters if HR < 50)

## 2017-05-24 NOTE — PROGRESS NOTES
[facility] Internal Medicine Interval Note    Name Justa Segovia       1960   Age/Sex 56 y.o. female   MRN [id number]   Code Status FULL     After 5PM or if no immediate response to page, please call for cross-coverage  Attending/Team: Dr Ivan Call [phone] to page   1st Call - Day Intern (R1):   Dr. Em 2nd Call - Day Sr. Resident (R2/R3):   Dr. Bosch         Chief complaint/ reason for interval visit (Primary Diagnosis)   Inpatient for acute LEFT-sided headache presumed to be 2/2 trigeminal neuralgia    Interval Problem Daily Status Update    Patient seen and examined at bedside with no acute overnight events. Continues to have intermittent LEFT-sided mod/severe HA which last secs-mins, but otherwise no worsening symptoms. Pain is controlled with Percocet, but otherwise has not significantly improved [facility] admission. Denies changes in vision, sensation, new focal weakness/numbness or changes in mentation. Continues to have urinary incontinence. Complains of new fever and wheeze/sob, but no cough/sputum production.    Cont IV Solumedrol, Carbamazepine, Lyrica, Tizanidine, Tecfidera, Percocet PRN  Neuro recs >> if headache persists, will likely consult vascular for temporal artery biopsy, f/u repeat ESR  New onset fever/sob/?wheeze >> blood cx x2  CXR shows worsening bilateral opacities, Urinalysis negative for infection >> start CTX and Doxy (borderline elevated QTc 448)       Principal Problem:    Unilateral neuralgiform headache POA: Unknown  Active Problems:    Multiple sclerosis (CMS-[facility])      Overview: Pt has hx of MS since the age of 22, started as Seizures managed with       Phenobarbital and diagnosed 10 yrs later as MS      Hx of worst episode 12 yrs ago requiring very high doses of Steroids (1200       mg ) * 5days      Was in remission for 10 yrs      Symptoms recurred since Thursday  (May 18th 2017)      Leg cramps, heaviness of both legs, followed  by severe headache unilateral       left side temporal region      Bladder control lost since yesterday          Fever w/ new onset SOB/?Wheeze POA: Unknown    Hyperlipidemia POA: Unknown    Depression POA: Unknown    History of cardiac arrhythmia POA: Unknown    Hyperglycemia POA: Unknown  Resolved Problems:    * No resolved hospital problems. *      Review of Systems   Constitutional: Positive for fever. Negative for chills.   HENT: Negative for congestion, ear pain, sore throat and tinnitus.    Eyes: Negative for blurred vision, double vision and pain.   Respiratory: Positive for shortness of breath and wheezing. Negative for cough, hemoptysis and sputum production.         Patient complains of subjective wheeze   Cardiovascular: Negative for chest pain, palpitations and leg swelling.   Gastrointestinal: Negative for heartburn, nausea, vomiting, abdominal pain, diarrhea and constipation.   Genitourinary: Negative for dysuria, urgency, frequency and hematuria.        Incontinence    Musculoskeletal: Negative.  Negative for myalgias, back pain and joint pain.   Skin: Negative for itching and rash.   Neurological: Positive for weakness and headaches. Negative for dizziness, tremors, sensory change, speech change, focal weakness, seizures and loss of consciousness.   Psychiatric/Behavioral: Negative.      Consultants  Neurology  ()    Disposition  Inpatient management of Trigeminal Neuralgia    Quality Measures  Core Measures  EKG reviewed, Labs reviewed, Medications reviewed and Radiology images reviewed  Schmitt catheter: None  DVT Prophylaxis: Lovenox  GI prophylaxis: None  Tubes: None  Abx: None  Lines: PIV   Schmitt: None      Physical Exam       Filed Vitals:    05/23/17 2000 05/24/17 0000 05/24/17 0800 05/24/17 1200   BP: 108/63 113/60 100/48 130/61   Pulse: 62 63 72 67   Temp: 36.1 °C (97 °F) 36.6 °C (97.8 °F) 38.4 °C (101.1 °F) 39.1 °C (102.4 °F)   Resp: 16 18 22 25   Height:       Weight:       SpO2:  96% 92% 92% 92%     Body mass index is 29.55 kg/(m^2).    Oxygen Therapy:  Pulse Oximetry: 92 %, O2 (LPM): 2, O2 Delivery: Nasal Cannula    Physical Exam   Constitutional: She is oriented to person, place, and time and well-developed, well-nourished, and in no distress. No distress.   HENT:   Head: Normocephalic and atraumatic.   Eyes: EOM are normal.   Neck: Normal range of motion.   Cardiovascular: Normal rate and regular rhythm.    No murmur heard.  Pulmonary/Chest: Effort normal and breath sounds normal. No respiratory distress. She has no wheezes. She exhibits no tenderness.   Abdominal: Soft. Bowel sounds are normal. She exhibits no distension. There is no tenderness.   Musculoskeletal: Normal range of motion. She exhibits no edema.   Lymphadenopathy:     She has no cervical adenopathy.   Neurological: She is alert and oriented to person, place, and time. She displays normal reflexes. No cranial nerve deficit. GCS score is 15.   5/5 strength throughout, No sensory deificts   Skin: She is not diaphoretic.         Lab Data Review:      5/24/2017  6:18 AM    Recent Labs      05/22/17 2318 05/23/17 0822  05/24/17 0247   SODIUM  134*   --   130*   POTASSIUM  3.8   --   4.5   CHLORIDE  101   --   102   CO2  21   --   19*   BUN  20   --   14   CREATININE  1.25   --   0.84   MAGNESIUM   --   2.2   --    CALCIUM  9.2   --   9.0       Recent Labs      05/22/17 2318 05/24/17 0247   ALTSGPT  22  41   ASTSGOT  27  49*   ALKPHOSPHAT  105*  115*   TBILIRUBIN  0.7  0.4   GLUCOSE  125*  201*       Recent Labs      05/22/17 2318 05/24/17 0247   RBC  3.75*  3.18*   HEMOGLOBIN  12.1  10.4*   HEMATOCRIT  35.7*  30.3*   PLATELETCT  212  210       Recent Labs      05/22/17 2318 05/24/17 0247   WBC  6.8  8.6   NEUTSPOLYS  77.00*   --    LYMPHOCYTES  14.00*   --    MONOCYTES  3.00   --    EOSINOPHILS  0.00   --    BASOPHILS  0.00   --    ASTSGOT  27  49*   ALTSGPT  22  41   ALKPHOSPHAT  105*  115*   TBILIRUBIN   0.7  0.4           Assessment/Plan     * Unilateral neuralgiform headache  Assessment & Plan  - New onset, localized primarily to LEFT fronto-temporal region, mod-severe, sharp, intermittent  - Presentation on admission was worrisome for temporal arteritis vs other neurogenic etiology  - Initial work-up:  (140 at [facility]), CRP 48    - CT Head: No acute intracranial abnormality  - MRI C/L-spine/brain: Evidence chronic demyelinating plaques of MS  - Neuro (Dr. Cosme) following >> cannot exclude Temporal Arteritis, but will continue current treatment for presumed Trigeminal Neuralgia.   - Cont Carbamazepine 200mg BID, Lyrica 75mg BID, Tizanidine 2mg BID, Percocet Q6 PRN+  - F/u repeat ESR >> potential temporal artery biopsy if still elevated    Multiple sclerosis (CMS-HCC)  Assessment & Plan  - Diagnosed 34 years ago and followed by [place] (Dr. Asmita Pollard)  - Has had hx of flare requiring high dose IV Steroids x5 days, chronic urinary incontinence  - Neuro (Dr. Cosme) consulted >> IV Solumedrol 500mg BID, Tecfidera  - Cont Oxybutynin for incontinence    Fever w/ new onset SOB/?Wheeze  Assessment & Plan  - x2 febrile recording 101.1, 102.4, WBC 8.6 this AM  - Etiology unclear, patient reports acute onset SOB and subjective wheeze. Denies any CP/cough, dysuria  - Cont RT protocol  - F/u Bloodcx x2, CXR, Urinalysis, CBC >> will hold on abx for now    Hyperlipidemia  Assessment & Plan  - Cont Pravastatin 40mg QD    Depression  Assessment & Plan  - Cont Sertraline 50mg QD    History of cardiac arrhythmia  Assessment & Plan  - No evidence of current abnormality  - EKG on admission sinus haylee, HR 58, QTc 448, stable on overnight tele  - Can cont home Metoprolol 25mg BID (with holding parameters if HR < 50)    Hyperglycemia  Assessment & Plan  - Glucose on admission 125; 5/24 >> 201  - Likely exacerbated by IV Steroids  - ISS, Hypoglycemic protocol, POC

## 2017-05-24 NOTE — PROGRESS NOTES
"                               [facility] Internal Medicine Interval Note    Name Justa Segovia       1960   Age/Sex 56 y.o. female   MRN [id number]   Code Status Full     After 5PM or if no immediate response to page, please call for cross-coverage  Attending/Team: Keith Call [phone] to page   1st Call - Day Intern (R1):   Dr. Em 2nd Call - Day Sr. Resident (R2/R3):   Dr. Mcfadden         Chief complaint/ reason for interval visit (Primary Diagnosis)   Headache    Interval Problem Daily Status Update     Patient continues to have intractable \"electric\" headache that is localized to the left temporal region. Patient states that the severity of the headache has decreased from a 10/10 to a 6/10 today. She states that she no longer has nausea or vomiting, which she attributes to the antiemetic medications that she is being given. She does disclose that she woke up this morning and had one episode of urinary incontinence. When she got up to make it to the restroom she states that she got very short of breath. She has also noted some increased wheezing w/ minimal cough this morning that she did not have yesterday. She has been currently saturating at >90% on 2L NC. This morning patient did not have history of fever, but upon rounding later the nurse measured a fever of 102.4 F. Patient has been complaining of intermittent chills with sweating,b ut had not had a fever until this morning that was documented. She continues to also complain of subjective weakness in bilateral lower extremities. Denies vision changes, congestion, sore throat, chest pain, orthopnea, paroxysmal nocturnal dyspnea, abdominal pain, diarrhea, constipation, muscle pain, or sensory changes in extremities.     Neurology saw patient yesterday and suggested that this might be trigeminal neuralgia based on the patient's description of the headaches, but that temporal arteritis could not be ruled out at this time. He also increased " dose of solumedrol to BID and continued patient's carbamazepine. Notably, patient did not have contrast enhancement noted on MRI scans of the brain or spinal cord (though did have areas of chronic plaque formation).    Patient's lesbian partner was in the room and stated that further useful information on patient's neurology status could be assessed from her history with Dr. Pollard at [facility].   Principal Problem:    Unilateral neuralgiform headache POA: Unknown  Active Problems:    Multiple sclerosis (CMS-[facility])      Overview: Pt has hx of MS since the age of 22, started as Seizures managed with       Phenobarbital and diagnosed 10 yrs later as MS      Hx of worst episode 12 yrs ago requiring very high doses of Steroids (1200       mg ) * 5days      Was in remission for 10 yrs      Symptoms recurred since Thursday  (May 18th 2017)      Leg cramps, heaviness of both legs, followed by severe headache unilateral       left side temporal region      Bladder control lost since yesterday        Resolved Problems:    * No resolved hospital problems. *      Review of Systems   Constitutional: Positive for fever, chills and diaphoresis. Negative for weight loss and malaise/fatigue.   HENT: Negative for congestion, ear pain, hearing loss, sore throat and tinnitus.    Eyes: Negative for blurred vision, double vision, photophobia and pain.   Respiratory: Positive for cough, shortness of breath and wheezing. Negative for sputum production.    Cardiovascular: Negative for chest pain and leg swelling.   Gastrointestinal: Negative for nausea, vomiting, abdominal pain, diarrhea and constipation.   Genitourinary: Negative for dysuria, urgency, frequency and hematuria.        Incontinence   Musculoskeletal: Negative for myalgias, back pain, joint pain and neck pain.        Complains of bilateral lower extremity weakness.   Skin: Negative for rash.   Neurological: Positive for headaches. Negative for dizziness, tingling, sensory  change, speech change, focal weakness and seizures.   Psychiatric/Behavioral: Positive for depression.       Consultants/Specialty  Neurology    Disposition  Inpatient for intractable temporal headache.    Quality Measures  EKG reviewed, Labs reviewed, Medications reviewed and Radiology images reviewed  Schmitt catheter: No Schmitt      DVT Prophylaxis: Enoxaparin (Lovenox)    Ulcer prophylaxis: No              Physical Exam       Filed Vitals:    05/23/17 2000 05/24/17 0000 05/24/17 0800 05/24/17 1200   BP: 108/63 113/60 100/48 130/61   Pulse: 62 63 72 67   Temp: 36.1 °C (97 °F) 36.6 °C (97.8 °F) 38.4 °C (101.1 °F) 39.1 °C (102.4 °F)   Resp: 16 18 22 25   Height:       Weight:       SpO2: 96% 92% 92% 92%     Body mass index is 29.55 kg/(m^2).    Oxygen Therapy:  Pulse Oximetry: 92 %, O2 (LPM): 2, O2 Delivery: Nasal Cannula    Physical Exam   Constitutional: She is oriented to person, place, and time. She appears distressed.   States that her HA severity is less, but appears more distressed this morning.   HENT:   Head: Normocephalic and atraumatic.   Temporal artery palpation is nontender. Pulse is felt.    Eyes: EOM are normal. Pupils are equal, round, and reactive to light. Right eye exhibits no discharge. Left eye exhibits no discharge.   Left eye ptosis. Tears present bilaterally.   Neck: Normal range of motion. No JVD present.   Cardiovascular: Normal rate, regular rhythm, normal heart sounds and intact distal pulses.  Exam reveals no gallop and no friction rub.    No murmur heard.  Pulmonary/Chest: Effort normal and breath sounds normal. No stridor. No respiratory distress. She has no wheezes.   Abdominal: Soft. Bowel sounds are normal. She exhibits no distension. There is no tenderness.   Musculoskeletal: Normal range of motion. She exhibits no edema.   Lymphadenopathy:     She has no cervical adenopathy.   Neurological: She is alert and oriented to person, place, and time. No cranial nerve deficit.   5/5  strength in bilateral upper and lower extremities. Equal sensation noted in bilateral extremities.    Skin: Skin is warm and dry. No rash noted.         Lab Data Review:      5/24/2017  1:57 PM    Recent Labs      05/22/17 2318 05/23/17 0822 05/24/17   0247   SODIUM  134*   --   130*   POTASSIUM  3.8   --   4.5   CHLORIDE  101   --   102   CO2  21   --   19*   BUN  20   --   14   CREATININE  1.25   --   0.84   MAGNESIUM   --   2.2   --    CALCIUM  9.2   --   9.0       Recent Labs      05/22/17 2318 05/24/17   0247   ALTSGPT  22  41   ASTSGOT  27  49*   ALKPHOSPHAT  105*  115*   TBILIRUBIN  0.7  0.4   GLUCOSE  125*  201*       Recent Labs      05/22/17 2318 05/24/17   0247   RBC  3.75*  3.18*   HEMOGLOBIN  12.1  10.4*   HEMATOCRIT  35.7*  30.3*   PLATELETCT  212  210       Recent Labs      05/22/17 2318 05/24/17   0247   WBC  6.8  8.6   NEUTSPOLYS  77.00*   --    LYMPHOCYTES  14.00*   --    MONOCYTES  3.00   --    EOSINOPHILS  0.00   --    BASOPHILS  0.00   --    ASTSGOT  27  49*   ALTSGPT  22  41   ALKPHOSPHAT  105*  115*   TBILIRUBIN  0.7  0.4           Assessment/Plan     1. Headache  Ddx: Trigeminal neuralgia vs. Temporal arteritis vs. Polymyalgia rheumatica  -Sharp, electric-like left temporal headache (10/10 on admission-->6/10 pain today).   -Positive for chills, fever, diaphoresis, SOB, mild cough, and urinary incontinence. Negative today for N/V (though was present prior to admission).   -Denies having this type of HA in past. Hx of migraines.    -Labs--> ESR: 114, CRP: 48, Hgb 12.1-->10.4.     -CT head showed no acute abnormalities; CXR: medial right lung base opacity w/ atelectasis  -MR of brain and spinal cord showed no new contrast enhancements, but did show chronic plaques related to hx of MS.   -Unclear at this time on etiology. ESR w/ chills might suggest temporal arteritis, but temporal artery pulse was palpable and non-tender. No associated vision changes were present. Patient has had  lower extremity weakness subjectively w/ normal motor function and strength on physical exam. Interestingly, this is seen in polymyalgia rheumatic often and PMR is seen in 50% of patients with GCA. Patient's description of 10/10, sharp, electric pain suggests possible trigeminal neuralgia, though this pain does not follow a typical trigeminal nerve pattern and is not associated with jaw movements.   Plan  -Follow neurology consult suggestions and increase IV solu-medrol 500 mg BID X 3days. Also, continue carbamazapine for possible trigeminal neuralgia. If patient's headache does not improve, then consider a temporal artery biopsy.  -Add daily aspirin 81 mg to treatment regimen  -Repeat ESR/CRP to follow clinical status.   -Continue fall, seizure, and aspiration precautions.    -Continue patient's home lyrica and tizanidine.    -Continue antiemetic medications as needed.     2. Multiple sclerosis  -Continue oxybutinin for incontinence.  -Obtain records from Dr. Asmita Pollard via [facility].     3. Fever w/ SOB.  -Fever of 102.4, WBC 8.6,   -CXR on admission showed possible right medial lobe opacity w/ atelectasis  -subjective history of SOB, wheezing, and dyspnea on exertion. Mild coughing present.  -Denies dysuria, hematuria, or increased frequency. Does have baseline incontinence.   Plan  -Will continue RT protocol  -Obtaining blood culture X2, CXR, urinalysis, and CBC  -Follow up on results.     4. Depression  -Continue patient's home sertraline 50 mg qhs PO    5. Hyperlipidemia  -Continue pravastatin 40 mg daily      Multiple sclerosis (CMS-[facility])  Assessment & Plan  - Diagnosed 34 years ago and followed by [facility] (Dr. Asmita Pollard)  - Has had hx of flare requiring high dose IV Steroids x5 days, chronic urinary incontinence  - Neuro (Dr. Cosme) consulted >> IV Solumedrol 500mg BID   - Cont Oxybutynin for incontinence

## 2017-05-25 ENCOUNTER — APPOINTMENT (OUTPATIENT)
Dept: RADIOLOGY | Facility: MEDICAL CENTER | Age: 57
DRG: 040 | End: 2017-05-25
Attending: INTERNAL MEDICINE
Payer: MEDICARE

## 2017-05-25 PROBLEM — G57.93 NEUROPATHY OF BOTH FEET: Status: ACTIVE | Noted: 2017-05-25

## 2017-05-25 PROBLEM — J96.00 RESPIRATORY FAILURE, ACUTE (HCC): Status: ACTIVE | Noted: 2017-05-25

## 2017-05-25 PROBLEM — G35 MULTIPLE SCLEROSIS EXACERBATION (HCC): Status: ACTIVE | Noted: 2017-05-25

## 2017-05-25 PROBLEM — M31.6 TEMPORAL ARTERITIS (HCC): Status: ACTIVE | Noted: 2017-05-25

## 2017-05-25 PROBLEM — G57.23 FEMORAL NEUROPATHY OF LOWER EXTREMITY, BILATERAL: Status: ACTIVE | Noted: 2017-05-25

## 2017-05-25 PROBLEM — J18.9 PNEUMONIA: Status: ACTIVE | Noted: 2017-05-24

## 2017-05-25 LAB
ALBUMIN SERPL BCP-MCNC: 3 G/DL (ref 3.2–4.9)
ALBUMIN/GLOB SERPL: 0.8 G/DL
ALP SERPL-CCNC: 131 U/L (ref 30–99)
ALT SERPL-CCNC: 46 U/L (ref 2–50)
ANION GAP SERPL CALC-SCNC: 11 MMOL/L (ref 0–11.9)
ANION GAP SERPL CALC-SCNC: 9 MMOL/L (ref 0–11.9)
AST SERPL-CCNC: 51 U/L (ref 12–45)
BASE EXCESS BLDA CALC-SCNC: -5 MMOL/L (ref -4–3)
BASOPHILS # BLD AUTO: 0 % (ref 0–1.8)
BASOPHILS # BLD AUTO: 0.9 % (ref 0–1.8)
BASOPHILS # BLD: 0 K/UL (ref 0–0.12)
BASOPHILS # BLD: 0.09 K/UL (ref 0–0.12)
BILIRUB SERPL-MCNC: 0.5 MG/DL (ref 0.1–1.5)
BODY TEMPERATURE: ABNORMAL CENTIGRADE
BUN SERPL-MCNC: 14 MG/DL (ref 8–22)
BUN SERPL-MCNC: 17 MG/DL (ref 8–22)
CALCIUM SERPL-MCNC: 8.5 MG/DL (ref 8.5–10.5)
CALCIUM SERPL-MCNC: 9 MG/DL (ref 8.5–10.5)
CHLORIDE SERPL-SCNC: 104 MMOL/L (ref 96–112)
CHLORIDE SERPL-SCNC: 99 MMOL/L (ref 96–112)
CO2 SERPL-SCNC: 20 MMOL/L (ref 20–33)
CO2 SERPL-SCNC: 20 MMOL/L (ref 20–33)
CREAT SERPL-MCNC: 0.88 MG/DL (ref 0.5–1.4)
CREAT SERPL-MCNC: 1.06 MG/DL (ref 0.5–1.4)
EOSINOPHIL # BLD AUTO: 0 K/UL (ref 0–0.51)
EOSINOPHIL # BLD AUTO: 0 K/UL (ref 0–0.51)
EOSINOPHIL NFR BLD: 0 % (ref 0–6.9)
EOSINOPHIL NFR BLD: 0 % (ref 0–6.9)
ERYTHROCYTE [DISTWIDTH] IN BLOOD BY AUTOMATED COUNT: 48.1 FL (ref 35.9–50)
ERYTHROCYTE [DISTWIDTH] IN BLOOD BY AUTOMATED COUNT: 48.9 FL (ref 35.9–50)
ERYTHROCYTE [SEDIMENTATION RATE] IN BLOOD BY WESTERGREN METHOD: 115 MM/HOUR (ref 0–30)
GFR SERPL CREATININE-BSD FRML MDRD: 54 ML/MIN/1.73 M 2
GFR SERPL CREATININE-BSD FRML MDRD: >60 ML/MIN/1.73 M 2
GLOBULIN SER CALC-MCNC: 3.6 G/DL (ref 1.9–3.5)
GLUCOSE BLD-MCNC: 168 MG/DL (ref 65–99)
GLUCOSE BLD-MCNC: 172 MG/DL (ref 65–99)
GLUCOSE BLD-MCNC: 179 MG/DL (ref 65–99)
GLUCOSE BLD-MCNC: 207 MG/DL (ref 65–99)
GLUCOSE SERPL-MCNC: 192 MG/DL (ref 65–99)
GLUCOSE SERPL-MCNC: 202 MG/DL (ref 65–99)
HCO3 BLDA-SCNC: 18 MMOL/L (ref 17–25)
HCT VFR BLD AUTO: 29.6 % (ref 37–47)
HCT VFR BLD AUTO: 32.4 % (ref 37–47)
HGB BLD-MCNC: 10.7 G/DL (ref 12–16)
HGB BLD-MCNC: 9.9 G/DL (ref 12–16)
LACTATE BLD-SCNC: 3.8 MMOL/L (ref 0.5–2)
LYMPHOCYTES # BLD AUTO: 0.41 K/UL (ref 1–4.8)
LYMPHOCYTES # BLD AUTO: 0.44 K/UL (ref 1–4.8)
LYMPHOCYTES NFR BLD: 2.6 % (ref 22–41)
LYMPHOCYTES NFR BLD: 4.4 % (ref 22–41)
MAGNESIUM SERPL-MCNC: 2.1 MG/DL (ref 1.5–2.5)
MANUAL DIFF BLD: NORMAL
MANUAL DIFF BLD: NORMAL
MCH RBC QN AUTO: 32 PG (ref 27–33)
MCH RBC QN AUTO: 32.6 PG (ref 27–33)
MCHC RBC AUTO-ENTMCNC: 33 G/DL (ref 33.6–35)
MCHC RBC AUTO-ENTMCNC: 33.4 G/DL (ref 33.6–35)
MCV RBC AUTO: 97 FL (ref 81.4–97.8)
MCV RBC AUTO: 97.4 FL (ref 81.4–97.8)
MONOCYTES # BLD AUTO: 0 K/UL (ref 0–0.85)
MONOCYTES # BLD AUTO: 0.27 K/UL (ref 0–0.85)
MONOCYTES NFR BLD AUTO: 0 % (ref 0–13.4)
MONOCYTES NFR BLD AUTO: 2.7 % (ref 0–13.4)
MORPHOLOGY BLD-IMP: NORMAL
MORPHOLOGY BLD-IMP: NORMAL
NEUTROPHILS # BLD AUTO: 15.49 K/UL (ref 2–7.15)
NEUTROPHILS # BLD AUTO: 9.29 K/UL (ref 2–7.15)
NEUTROPHILS NFR BLD: 87.6 % (ref 44–72)
NEUTROPHILS NFR BLD: 90.3 % (ref 44–72)
NEUTS BAND NFR BLD MANUAL: 4.4 % (ref 0–10)
NEUTS BAND NFR BLD MANUAL: 7.1 % (ref 0–10)
NRBC # BLD AUTO: 0 K/UL
NRBC # BLD AUTO: 0 K/UL
NRBC BLD AUTO-RTO: 0 /100 WBC
NRBC BLD AUTO-RTO: 0 /100 WBC
PCO2 BLDA: 26 MMHG (ref 26–37)
PH BLDA: 7.45 [PH] (ref 7.4–7.5)
PLATELET # BLD AUTO: 231 K/UL (ref 164–446)
PLATELET # BLD AUTO: 270 K/UL (ref 164–446)
PLATELET BLD QL SMEAR: NORMAL
PMV BLD AUTO: 10.8 FL (ref 9–12.9)
PMV BLD AUTO: 11.1 FL (ref 9–12.9)
PO2 BLDA: 58.5 MMHG (ref 64–87)
POTASSIUM SERPL-SCNC: 4.5 MMOL/L (ref 3.6–5.5)
POTASSIUM SERPL-SCNC: 4.7 MMOL/L (ref 3.6–5.5)
PROT SERPL-MCNC: 6.6 G/DL (ref 6–8.2)
RBC # BLD AUTO: 3.04 M/UL (ref 4.2–5.4)
RBC # BLD AUTO: 3.34 M/UL (ref 4.2–5.4)
RBC BLD AUTO: NORMAL
RBC BLD AUTO: PRESENT
SAO2 % BLDA: 89.4 % (ref 93–99)
SODIUM SERPL-SCNC: 130 MMOL/L (ref 135–145)
SODIUM SERPL-SCNC: 133 MMOL/L (ref 135–145)
TOXIC GRANULES BLD QL SMEAR: NORMAL
TROPONIN I SERPL-MCNC: 0.02 NG/ML (ref 0–0.04)
WBC # BLD AUTO: 10.1 K/UL (ref 4.8–10.8)
WBC # BLD AUTO: 15.9 K/UL (ref 4.8–10.8)

## 2017-05-25 PROCEDURE — 83735 ASSAY OF MAGNESIUM: CPT

## 2017-05-25 PROCEDURE — 87503 INFLUENZA DNA AMP PROB ADDL: CPT

## 2017-05-25 PROCEDURE — 36415 COLL VENOUS BLD VENIPUNCTURE: CPT

## 2017-05-25 PROCEDURE — 71010 DX-CHEST-PORTABLE (1 VIEW): CPT

## 2017-05-25 PROCEDURE — 700111 HCHG RX REV CODE 636 W/ 250 OVERRIDE (IP): Performed by: PHARMACIST

## 2017-05-25 PROCEDURE — 94667 MNPJ CHEST WALL 1ST: CPT

## 2017-05-25 PROCEDURE — 700105 HCHG RX REV CODE 258: Performed by: HOSPITALIST

## 2017-05-25 PROCEDURE — 99233 SBSQ HOSP IP/OBS HIGH 50: CPT | Mod: GC | Performed by: INTERNAL MEDICINE

## 2017-05-25 PROCEDURE — 700105 HCHG RX REV CODE 258: Performed by: STUDENT IN AN ORGANIZED HEALTH CARE EDUCATION/TRAINING PROGRAM

## 2017-05-25 PROCEDURE — 80048 BASIC METABOLIC PNL TOTAL CA: CPT

## 2017-05-25 PROCEDURE — 700111 HCHG RX REV CODE 636 W/ 250 OVERRIDE (IP): Performed by: STUDENT IN AN ORGANIZED HEALTH CARE EDUCATION/TRAINING PROGRAM

## 2017-05-25 PROCEDURE — A9270 NON-COVERED ITEM OR SERVICE: HCPCS | Performed by: PSYCHIATRY & NEUROLOGY

## 2017-05-25 PROCEDURE — 700102 HCHG RX REV CODE 250 W/ 637 OVERRIDE(OP): Performed by: INTERNAL MEDICINE

## 2017-05-25 PROCEDURE — 85652 RBC SED RATE AUTOMATED: CPT

## 2017-05-25 PROCEDURE — 700105 HCHG RX REV CODE 258: Performed by: PHARMACIST

## 2017-05-25 PROCEDURE — 700102 HCHG RX REV CODE 250 W/ 637 OVERRIDE(OP): Performed by: PSYCHIATRY & NEUROLOGY

## 2017-05-25 PROCEDURE — 93010 ELECTROCARDIOGRAM REPORT: CPT | Performed by: INTERNAL MEDICINE

## 2017-05-25 PROCEDURE — 700111 HCHG RX REV CODE 636 W/ 250 OVERRIDE (IP): Performed by: INTERNAL MEDICINE

## 2017-05-25 PROCEDURE — 94760 N-INVAS EAR/PLS OXIMETRY 1: CPT

## 2017-05-25 PROCEDURE — 700101 HCHG RX REV CODE 250: Performed by: INTERNAL MEDICINE

## 2017-05-25 PROCEDURE — 82803 BLOOD GASES ANY COMBINATION: CPT

## 2017-05-25 PROCEDURE — 83605 ASSAY OF LACTIC ACID: CPT

## 2017-05-25 PROCEDURE — A9270 NON-COVERED ITEM OR SERVICE: HCPCS | Performed by: INTERNAL MEDICINE

## 2017-05-25 PROCEDURE — 87502 INFLUENZA DNA AMP PROBE: CPT

## 2017-05-25 PROCEDURE — 99291 CRITICAL CARE FIRST HOUR: CPT | Performed by: INTERNAL MEDICINE

## 2017-05-25 PROCEDURE — 82962 GLUCOSE BLOOD TEST: CPT | Mod: 91

## 2017-05-25 PROCEDURE — 80053 COMPREHEN METABOLIC PANEL: CPT

## 2017-05-25 PROCEDURE — A9270 NON-COVERED ITEM OR SERVICE: HCPCS | Performed by: STUDENT IN AN ORGANIZED HEALTH CARE EDUCATION/TRAINING PROGRAM

## 2017-05-25 PROCEDURE — 85007 BL SMEAR W/DIFF WBC COUNT: CPT

## 2017-05-25 PROCEDURE — 85027 COMPLETE CBC AUTOMATED: CPT

## 2017-05-25 PROCEDURE — 94669 MECHANICAL CHEST WALL OSCILL: CPT

## 2017-05-25 PROCEDURE — 93005 ELECTROCARDIOGRAM TRACING: CPT | Performed by: INTERNAL MEDICINE

## 2017-05-25 PROCEDURE — 770022 HCHG ROOM/CARE - ICU (200)

## 2017-05-25 PROCEDURE — 84484 ASSAY OF TROPONIN QUANT: CPT

## 2017-05-25 PROCEDURE — 87641 MR-STAPH DNA AMP PROBE: CPT

## 2017-05-25 PROCEDURE — 700102 HCHG RX REV CODE 250 W/ 637 OVERRIDE(OP): Performed by: STUDENT IN AN ORGANIZED HEALTH CARE EDUCATION/TRAINING PROGRAM

## 2017-05-25 PROCEDURE — 700101 HCHG RX REV CODE 250: Performed by: PSYCHIATRY & NEUROLOGY

## 2017-05-25 PROCEDURE — 700105 HCHG RX REV CODE 258: Performed by: INTERNAL MEDICINE

## 2017-05-25 PROCEDURE — 94640 AIRWAY INHALATION TREATMENT: CPT

## 2017-05-25 PROCEDURE — 99292 CRITICAL CARE ADDL 30 MIN: CPT | Performed by: INTERNAL MEDICINE

## 2017-05-25 RX ORDER — FUROSEMIDE 10 MG/ML
20 INJECTION INTRAMUSCULAR; INTRAVENOUS ONCE
Status: COMPLETED | OUTPATIENT
Start: 2017-05-25 | End: 2017-05-25

## 2017-05-25 RX ORDER — ALBUTEROL SULFATE 90 UG/1
2 AEROSOL, METERED RESPIRATORY (INHALATION)
Status: DISCONTINUED | OUTPATIENT
Start: 2017-05-25 | End: 2017-06-02 | Stop reason: HOSPADM

## 2017-05-25 RX ORDER — ACETAMINOPHEN 650 MG/1
650 SUPPOSITORY RECTAL EVERY 4 HOURS PRN
Status: DISCONTINUED | OUTPATIENT
Start: 2017-05-25 | End: 2017-05-25

## 2017-05-25 RX ORDER — OXYCODONE HYDROCHLORIDE 5 MG/1
2.5 TABLET ORAL EVERY 4 HOURS PRN
Status: DISCONTINUED | OUTPATIENT
Start: 2017-05-25 | End: 2017-05-25

## 2017-05-25 RX ORDER — SODIUM CHLORIDE 9 MG/ML
30 INJECTION, SOLUTION INTRAVENOUS
Status: COMPLETED | OUTPATIENT
Start: 2017-05-25 | End: 2017-05-25

## 2017-05-25 RX ORDER — SODIUM CHLORIDE 9 MG/ML
1000 INJECTION, SOLUTION INTRAVENOUS
Status: DISCONTINUED | OUTPATIENT
Start: 2017-05-25 | End: 2017-05-27

## 2017-05-25 RX ORDER — SODIUM CHLORIDE 9 MG/ML
INJECTION, SOLUTION INTRAVENOUS CONTINUOUS
Status: DISCONTINUED | OUTPATIENT
Start: 2017-05-25 | End: 2017-05-27

## 2017-05-25 RX ORDER — ACETAMINOPHEN 650 MG/1
650 SUPPOSITORY RECTAL EVERY 6 HOURS PRN
Status: DISCONTINUED | OUTPATIENT
Start: 2017-05-25 | End: 2017-06-02 | Stop reason: HOSPADM

## 2017-05-25 RX ADMIN — ALBUTEROL SULFATE 2 PUFF: 90 AEROSOL, METERED RESPIRATORY (INHALATION) at 19:46

## 2017-05-25 RX ADMIN — OXYCODONE HYDROCHLORIDE AND ACETAMINOPHEN 2 TABLET: 5; 325 TABLET ORAL at 08:44

## 2017-05-25 RX ADMIN — SODIUM CHLORIDE 500 MG: 9 INJECTION, SOLUTION INTRAVENOUS at 10:00

## 2017-05-25 RX ADMIN — SERTRALINE 50 MG: 50 TABLET, FILM COATED ORAL at 21:30

## 2017-05-25 RX ADMIN — SODIUM CHLORIDE: 9 INJECTION, SOLUTION INTRAVENOUS at 12:58

## 2017-05-25 RX ADMIN — DOXYCYCLINE 100 MG: 100 TABLET ORAL at 08:45

## 2017-05-25 RX ADMIN — BISACODYL 10 MG: 10 SUPPOSITORY RECTAL at 08:45

## 2017-05-25 RX ADMIN — TIZANIDINE 2 MG: 4 TABLET ORAL at 08:45

## 2017-05-25 RX ADMIN — INSULIN LISPRO 2 UNITS: 100 INJECTION, SOLUTION INTRAVENOUS; SUBCUTANEOUS at 05:48

## 2017-05-25 RX ADMIN — SODIUM CHLORIDE 500 MG: 9 INJECTION, SOLUTION INTRAVENOUS at 21:58

## 2017-05-25 RX ADMIN — ALBUTEROL SULFATE 2.5 MG: 2.5 SOLUTION RESPIRATORY (INHALATION) at 13:58

## 2017-05-25 RX ADMIN — INSULIN LISPRO 2 UNITS: 100 INJECTION, SOLUTION INTRAVENOUS; SUBCUTANEOUS at 17:32

## 2017-05-25 RX ADMIN — CARBAMAZEPINE 200 MG: 200 TABLET ORAL at 21:30

## 2017-05-25 RX ADMIN — PREGABALIN 75 MG: 75 CAPSULE ORAL at 21:30

## 2017-05-25 RX ADMIN — METOPROLOL TARTRATE 25 MG: 25 TABLET, FILM COATED ORAL at 08:45

## 2017-05-25 RX ADMIN — STANDARDIZED SENNA CONCENTRATE AND DOCUSATE SODIUM 2 TABLET: 8.6; 5 TABLET, FILM COATED ORAL at 08:45

## 2017-05-25 RX ADMIN — METOPROLOL TARTRATE 25 MG: 25 TABLET, FILM COATED ORAL at 21:30

## 2017-05-25 RX ADMIN — VANCOMYCIN HYDROCHLORIDE 1800 MG: 100 INJECTION, POWDER, LYOPHILIZED, FOR SOLUTION INTRAVENOUS at 22:37

## 2017-05-25 RX ADMIN — TIZANIDINE 2 MG: 4 TABLET ORAL at 21:30

## 2017-05-25 RX ADMIN — ACETAMINOPHEN 500 MG: 500 TABLET ORAL at 19:17

## 2017-05-25 RX ADMIN — STANDARDIZED SENNA CONCENTRATE AND DOCUSATE SODIUM 2 TABLET: 8.6; 5 TABLET, FILM COATED ORAL at 21:30

## 2017-05-25 RX ADMIN — OXYBUTYNIN CHLORIDE 10 MG: 10 TABLET, FILM COATED, EXTENDED RELEASE ORAL at 21:30

## 2017-05-25 RX ADMIN — FUROSEMIDE 20 MG: 10 INJECTION, SOLUTION INTRAVENOUS at 19:37

## 2017-05-25 RX ADMIN — CARBAMAZEPINE 200 MG: 200 TABLET ORAL at 08:56

## 2017-05-25 RX ADMIN — OXYCODONE HYDROCHLORIDE AND ACETAMINOPHEN 1 TABLET: 5; 325 TABLET ORAL at 03:39

## 2017-05-25 RX ADMIN — ENOXAPARIN SODIUM 40 MG: 100 INJECTION SUBCUTANEOUS at 08:45

## 2017-05-25 RX ADMIN — SODIUM CHLORIDE 1000 ML: 9 INJECTION, SOLUTION INTRAVENOUS at 22:36

## 2017-05-25 RX ADMIN — INSULIN LISPRO 2 UNITS: 100 INJECTION, SOLUTION INTRAVENOUS; SUBCUTANEOUS at 11:52

## 2017-05-25 RX ADMIN — DOXYCYCLINE 100 MG: 100 INJECTION, POWDER, LYOPHILIZED, FOR SOLUTION INTRAVENOUS at 22:37

## 2017-05-25 RX ADMIN — PREGABALIN 75 MG: 75 CAPSULE ORAL at 08:44

## 2017-05-25 RX ADMIN — CEFTRIAXONE SODIUM 1 G: 1 INJECTION, POWDER, FOR SOLUTION INTRAMUSCULAR; INTRAVENOUS at 08:56

## 2017-05-25 RX ADMIN — PIPERACILLIN SODIUM AND TAZOBACTAM SODIUM 4.5 G: 4; .5 INJECTION, POWDER, FOR SOLUTION INTRAVENOUS at 23:40

## 2017-05-25 RX ADMIN — SODIUM CHLORIDE: 9 INJECTION, SOLUTION INTRAVENOUS at 22:36

## 2017-05-25 ASSESSMENT — PAIN SCALES - GENERAL
PAINLEVEL_OUTOF10: 0
PAINLEVEL_OUTOF10: 0
PAINLEVEL_OUTOF10: 6
PAINLEVEL_OUTOF10: 3

## 2017-05-25 ASSESSMENT — ENCOUNTER SYMPTOMS
COUGH: 1
NECK PAIN: 0
LOSS OF CONSCIOUSNESS: 0
CONSTIPATION: 0
SPEECH CHANGE: 0
CHILLS: 0
HEARTBURN: 0
TREMORS: 0
DIARRHEA: 0
BLURRED VISION: 0
MUSCULOSKELETAL NEGATIVE: 1
FOCAL WEAKNESS: 0
SHORTNESS OF BREATH: 1
HEMOPTYSIS: 0
WHEEZING: 1
BACK PAIN: 0
SPUTUM PRODUCTION: 0
WEAKNESS: 1
DEPRESSION: 1
COUGH: 0
DIZZINESS: 0
HEADACHES: 1
FEVER: 0
PSYCHIATRIC NEGATIVE: 1
TINGLING: 0
EYE PAIN: 0
WEIGHT LOSS: 0
NAUSEA: 0
PALPITATIONS: 0
SORE THROAT: 0
SENSORY CHANGE: 0
MYALGIAS: 0
DOUBLE VISION: 0
ABDOMINAL PAIN: 0
DIAPHORESIS: 0
PHOTOPHOBIA: 0
SEIZURES: 0
VOMITING: 0

## 2017-05-25 NOTE — PROGRESS NOTES
NEUROLOGY PROGRESS NOTE      Background:  56 y.o. female was admitted on 5/22/2017 11:07 PM for Multiple sclerosis exacerbation (CMS-HCC)  Temporal arteritis (CMS-HCC).  She is being followed by Neurology for Possible multiple sclerosis flare versus temporal    arteritis versus trigeminal neuralgia.    SUBJECTIVE: feels better this morning but still has vague left sided dull headace. Repeated sed rate was 114. No visual changes.    NEUROLOGICAL EXAM:   She is awake, alert, fully oriented.     Facial motor and sensation intact.  Extraocular movements are full.  Visual    fields are full to confrontation.  Funduscopic exam with sharp disks    bilaterally, is intact to finger rub bilaterally.  Tongue in midline and    protrudes symmetrically.  Palate elevate symmetrical.  Shoulder shrugs are normal.     Motor examination revealed normal strength to direct testing of both upper and   lower extremity, proximal and distal sensation intact to light perception and   pinprick.  Coordination intact to finger-to-nose and heel-to-shin testing.     Deep tendon reflexes are exaggerated in lower extremity, 3+ in right patellar,   2+ in right Achilles tendon.  She has 3+ reflexes in the left patella with a    3-4 beats of clonus.  She has 3+ reflexes in the left Achilles tendon.     Reflexes are 2+ in brachioradialis.  Plantar reflexes are equivocal.    OBJECTIVE:     NEUROIMAGING:    Brain MRI(5/23):  1.  Multifocal abnormal T2 hyperintensities in the subcortical and periventricular white matter. None of the lesions demonstrates contrast enhancement. In view of history of multiple sclerosis this findings likely represents chronic demyelinating plaques   of multiple sclerosis.  2.  Mild cerebral atrophy.       Cervical spine MRI:(5/23):  1.  Abnormal intramedullary T2 signal intensity in the cervical spinal cord likely representing chronic demyelinating plaques of multiple sclerosis. None of the lesions demonstrates contrast  "enhancement.  2.  Minimal degenerative disease as described above.      MEDICATIONS:  Current Facility-Administered Medications   Medication Dose   • insulin lispro (HUMALOG) injection 2-9 Units  2-9 Units   • methylPREDNISolone sod succ (SOLU-MEDROL) 500 mg in  mL IVPB  500 mg   • cefTRIAXone (ROCEPHIN) 1 g in  mL IVPB  1 g   • doxycycline monohydrate (ADOXA) tablet 100 mg  100 mg   • acetaminophen (TYLENOL) tablet 500 mg  500 mg   • oxycodone-acetaminophen (PERCOCET) 5-325 MG per tablet 2 Tab  2 Tab   • senna-docusate (PERICOLACE or SENOKOT S) 8.6-50 MG per tablet 2 Tab  2 Tab    And   • polyethylene glycol/lytes (MIRALAX) PACKET 1 Packet  1 Packet    And   • magnesium hydroxide (MILK OF MAGNESIA) suspension 30 mL  30 mL    And   • bisacodyl (DULCOLAX) suppository 10 mg  10 mg   • Respiratory Care per Protocol     • NS infusion     • enoxaparin (LOVENOX) inj 40 mg  40 mg   • pregabalin (LYRICA) capsule 75 mg  75 mg   • sertraline (ZOLOFT) tablet 50 mg  50 mg   • tizanidine (ZANAFLEX) tablet 2 mg  2 mg   • oxybutynin SR (DITROPAN-XL) tablet 10 mg  10 mg   • ondansetron (ZOFRAN) syringe/vial injection 4 mg  4 mg   • ondansetron (ZOFRAN ODT) dispertab 4 mg  4 mg   • promethazine (PHENERGAN) tablet 12.5-25 mg  12.5-25 mg   • promethazine (PHENERGAN) suppository 12.5-25 mg  12.5-25 mg   • prochlorperazine (COMPAZINE) injection 5-10 mg  5-10 mg   • pravastatin (PRAVACHOL) tablet 40 mg  40 mg   • carbamazepine (TEGRETOL) tablet 200 mg  200 mg   • metoprolol (LOPRESSOR) tablet 25 mg  25 mg       Blood pressure 112/48, pulse 68, temperature 37.7 °C (99.8 °F), resp. rate 21, height 1.575 m (5' 2\"), weight 73.3 kg (161 lb 9.6 oz), SpO2 92 %, not currently breastfeeding.        Recent Labs      05/24/17   0247  05/24/17   1426  05/25/17   0246   WBC  8.6  11.6*  10.1   RBC  3.18*  3.26*  3.04*   HEMOGLOBIN  10.4*  10.6*  9.9*   HEMATOCRIT  30.3*  32.0*  29.6*   MCV  95.3  98.2*  97.4   MCH  32.7  32.5  32.6   MCHC "  34.3  33.1*  33.4*   RDW  45.1  46.6  48.1   PLATELETCT  210  220  231   MPV  11.1  10.8  11.1     Recent Labs      05/22/17   2318  05/24/17   0247  05/25/17   0246   SODIUM  134*  130*  133*   POTASSIUM  3.8  4.5  4.7   CHLORIDE  101  102  104   CO2  21  19*  20   GLUCOSE  125*  201*  202*   BUN  20  14  14       No results found for this or any previous visit.     ASSESSMENT AND PLAN:  A 56-year-old female with history of multiple sclerosis    who presented with lower ext weakness and bladder incontinence with possible MS exacerbation, she will con't with Solu-Medrol 500 mg twice daily X 3 days. Day 3/3.  She also has left-sided headache as well as episodes of sharp    shooting, stabbing pain in the distribution of all 3 divisions of trigeminal    Nerve. DDX: trigeminal neuralgia vs temporal arteritis.She had a high sed rate of 140 at a [facility] and here her sed rate was 114 which is concerning for temporal arteritis.   Her repeated sed rate is 114 and she continues with a dull left-sided headache, would consider vascular surgery consult for temporal artery biopsy.

## 2017-05-25 NOTE — PROGRESS NOTES
Received call from [facility] green team (Dr. Ivan)  to check on patient. RN communicated that patient was still having fever and continued pain. Order for increased pain medicine received.

## 2017-05-25 NOTE — PROGRESS NOTES
Pt c/o left side head pain- alert and oriented x4, IFV infusing, POC discussed, call light at bedside, safety precautions in place.

## 2017-05-25 NOTE — CARE PLAN
Problem: Communication  Goal: The ability to communicate needs accurately and effectively will improve  Outcome: MET Date Met:  05/25/17    Problem: Infection  Goal: Will remain free from infection  Outcome: PROGRESSING AS EXPECTED  No fevers today    Problem: Knowledge Deficit  Goal: Knowledge of disease process/condition, treatment plan, diagnostic tests, and medications will improve  Outcome: MET Date Met:  05/25/17  Goal: Knowledge of the prescribed therapeutic regimen will improve  Outcome: MET Date Met:  05/25/17

## 2017-05-25 NOTE — PROGRESS NOTES
[facility] Internal Medicine Interval Note    Name Justa Segovia       1960   Age/Sex 56 y.o. female   MRN [id number]   Code Status FULL     After 5PM or if no immediate response to page, please call for cross-coverage  Attending/Team: Dr Ivan Call [phone] to page   1st Call - Day Intern (R1):   Dr. Em 2nd Call - Day Sr. Resident (R2/R3):   Dr. Bosch         Chief complaint/ reason for interval visit (Primary Diagnosis)   Inpatient for acute LEFT-sided headache presumed to be 2/2 trigeminal neuralgia    Interval Problem Daily Status Update    Patient seen and examined at bedside with no acute overnight events. Headache has nearly resolved, thought not completely. Denies recurrent fevers overnight. Pain is controlled with Percocet, but is making her very drowsy. Denies changes in vision, sensation, new focal weakness/numbness or changes in mentation. Urinary incontinence has improved.     Percocet 5-325 changed to Oxy 2.5mg Q4 PRN  Cont IV Solumedrol, Carbamazepine, Lyrica, Tizanidine, Tecfidera  Fever has resolved >> CXR showed bilateral opacities >> Cont IV CTX  Neuro recs >> f/u ESR still elevated 115 >> consult Vascular Surgery (Dr. Faith) for temporal artery bx     Principal Problem:    Unilateral neuralgiform headache POA: Unknown  Active Problems:    Multiple sclerosis (CMS-[facility])      Overview: Pt has hx of MS since the age of 22, started as Seizures managed with       Phenobarbital and diagnosed 10 yrs later as MS      Hx of worst episode 12 yrs ago requiring very high doses of Steroids (1200       mg ) * 5days      Was in remission for 10 yrs      Symptoms recurred since Thursday  (May 18th 2017)      Leg cramps, heaviness of both legs, followed by severe headache unilateral       left side temporal region      Bladder control lost since yesterday          Pneumonia likely 2/2 aspiration    Hyperlipidemia POA: Unknown    Depression POA: Unknown     History of cardiac arrhythmia POA: Unknown    Hyperglycemia POA: Unknown    Neuropathy of both feet  Resolved Problems:    * No resolved hospital problems. *      Review of Systems   Constitutional: Negative for fever and chills.   HENT: Negative for congestion, ear pain, sore throat and tinnitus.         Significantly improved headache   Eyes: Negative for blurred vision, double vision and pain.   Respiratory: Positive for shortness of breath and wheezing. Negative for cough, hemoptysis and sputum production.         Improving SOB   Cardiovascular: Negative for chest pain, palpitations and leg swelling.   Gastrointestinal: Negative for heartburn, nausea, vomiting, abdominal pain, diarrhea and constipation.   Genitourinary: Negative for dysuria, urgency, frequency and hematuria.        Improving urinary Incontinence    Musculoskeletal: Negative.  Negative for myalgias, back pain and joint pain.   Skin: Negative for itching and rash.   Neurological: Positive for weakness and headaches. Negative for dizziness, tremors, sensory change, speech change, focal weakness, seizures and loss of consciousness.   Psychiatric/Behavioral: Negative.      Consultants  Neurology  ()  Vascular (Dr. Faith)    Disposition  Inpatient management of Trigeminal Neuralgia    Quality Measures  Core Measures  EKG reviewed, Labs reviewed, Medications reviewed and Radiology images reviewed  Schmitt catheter: None  DVT Prophylaxis: Lovenox  GI prophylaxis: None  Tubes: None  Abx: IV CTX  Lines: PIV   Schmitt: None      Physical Exam       Filed Vitals:    05/25/17 0415 05/25/17 0800 05/25/17 1200 05/25/17 1215   BP: 104/52 112/48 107/60    Pulse: 62 68 66    Temp: 36.9 °C (98.4 °F) 37.7 °C (99.8 °F) 37.5 °C (99.5 °F)    Resp: 16 21 18    Height:       Weight:       SpO2: 90% 92% 88% 93%     Body mass index is 29.55 kg/(m^2).    Oxygen Therapy:  Pulse Oximetry: 93 %, O2 (LPM): 3, O2 Delivery: Nasal Cannula    Physical Exam    Constitutional: She is oriented to person, place, and time and well-developed, well-nourished, and in no distress. No distress.   HENT:   Head: Normocephalic and atraumatic.   Eyes: EOM are normal.   Neck: Normal range of motion.   Cardiovascular: Normal rate and regular rhythm.    No murmur heard.  Pulmonary/Chest: Effort normal and breath sounds normal. No respiratory distress. She has no wheezes. She exhibits no tenderness.   Abdominal: Soft. Bowel sounds are normal. She exhibits no distension. There is no tenderness.   Musculoskeletal: Normal range of motion. She exhibits no edema.   Lymphadenopathy:     She has no cervical adenopathy.   Neurological: She is alert and oriented to person, place, and time. She displays normal reflexes. No cranial nerve deficit. GCS score is 15.   5/5 strength throughout, No sensory deificts   Skin: She is not diaphoretic.         Lab Data Review:      5/24/2017  6:18 AM    Recent Labs      05/22/17 2318 05/23/17 0822 05/24/17 0247 05/25/17 0246   SODIUM  134*   --   130*  133*   POTASSIUM  3.8   --   4.5  4.7   CHLORIDE  101   --   102  104   CO2  21   --   19*  20   BUN  20   --   14  14   CREATININE  1.25   --   0.84  0.88   MAGNESIUM   --   2.2   --   2.1   CALCIUM  9.2   --   9.0  8.5       Recent Labs      05/22/17 2318 05/24/17 0247 05/25/17   0246   ALTSGPT  22  41   --    ASTSGOT  27  49*   --    ALKPHOSPHAT  105*  115*   --    TBILIRUBIN  0.7  0.4   --    GLUCOSE  125*  201*  202*       Recent Labs      05/24/17 0247 05/24/17 1426 05/25/17   0246   RBC  3.18*  3.26*  3.04*   HEMOGLOBIN  10.4*  10.6*  9.9*   HEMATOCRIT  30.3*  32.0*  29.6*   PLATELETCT  210  220  231       Recent Labs      05/22/17 2318 05/24/17 0247 05/24/17 1426 05/25/17   0246   WBC  6.8  8.6  11.6*  10.1   NEUTSPOLYS  77.00*   --   86.10*  87.60*   LYMPHOCYTES  14.00*   --   12.20*  4.40*   MONOCYTES  3.00   --   0.80  2.70   EOSINOPHILS  0.00   --   0.00  0.00    BASOPHILS  0.00   --   0.00  0.90   ASTSGOT  27  49*   --    --    ALTSGPT  22  41   --    --    ALKPHOSPHAT  105*  115*   --    --    TBILIRUBIN  0.7  0.4   --    --            Assessment/Plan     * Unilateral neuralgiform headache  Assessment & Plan  - New onset, localized primarily to LEFT fronto-temporal region, mod-severe, sharp, intermittent  - Presentation on admission was worrisome for temporal arteritis vs other neurogenic etiology  - Initial work-up:  (140 at [facility]), CRP 48    - CT Head: No acute intracranial abnormality  - MRI C/L-spine/brain: Evidence chronic demyelinating plaques of MS  - Neuro (Dr. Cosme) following >> cannot exclude Temporal Arteritis, but will continue current treatment for presumed Trigeminal Neuralgia.   - Cont Carbamazepine 200mg BID, Oxy 2.5 Q4 PRN  - Repeat ESR >> 115  - Consult Vascular surgery    Multiple sclerosis (CMS-HCC)  Assessment & Plan  - Diagnosed 34 years ago and followed by [facility] (Dr. Asmita Pollard)  - Has had hx of flare requiring high dose IV Steroids x5 days, chronic urinary incontinence  - Neuro (Dr. Cosme) consulted >> IV Solumedrol 500mg BID, Tecfidera  - Cont Oxybutynin for incontinence    Pneumonia likely 2/2 aspiration  Assessment & Plan  - x2 febrile recording 101.1, 102.4 >> currently afebrile  - CXR showed bilateral opacities, Urinalysis negative for infection  - Cont RT protocol, IV CTX    Hyperlipidemia  Assessment & Plan  - Cont Pravastatin 40mg QD    Depression  Assessment & Plan  - Cont Sertraline 50mg QD    History of cardiac arrhythmia  Assessment & Plan  - No evidence of current abnormality  - EKG on admission sinus haylee, HR 58, QTc 448, stable on overnight tele  - Can cont home Metoprolol 25mg BID (with holding parameters if HR < 50)    Hyperglycemia  Assessment & Plan  - Glucose on admission 125; 5/24 >> 201  - Likely exacerbated by IV Steroids  - ISS, Hypoglycemic protocol, POC     Neuropathy of both  feet  Assessment & Plan  - Cont Lyrica 75mg BID, Tizanidine 2mg BID

## 2017-05-25 NOTE — PROGRESS NOTES
Patient very sleepy today. Meds adjusted. Patient has been up to the bathroom multiple times. She has also walked in the halls twice today. Patient is using SMI. Patient had to be bumped up to 4L of o2 due to low O2 sats. Other vitals stable. Patient has not complained of pain today other than early this morning. Doculax given. No BM in six days. Only small results thus far.

## 2017-05-25 NOTE — PROGRESS NOTES
"                               [facility] Internal Medicine Interval Note    Name Justa Segovia       1960   Age/Sex 56 y.o. female   MRN [id number]   Code Status Prior     After 5PM or if no immediate response to page, please call for cross-coverage  Attending/Team: Keith Call [phone] to page   1st Call - Day Intern (R1):   Dr. Em 2nd Call - Day Sr. Resident (R2/R3):   Dr. Bosch         Chief complaint/ reason for interval visit (Primary Diagnosis)   Headache    Interval Problem Daily Status Update     Patient states that her headache feels significantly better this morning. She continues to have a dull ache on the left temporal region and rates it at 1/10 severity. She has not had the \"electric-like\", stabbing headache since yesterday evening. She also states that she only had one percocet this morning and feels like she does not have to take as much as she had yesterday morning. Patient has become more drowsy on the percocet. She denies any changes in vision, sensory/motor loss in extremities, or continued fevers/chills. Patient has had a continued increase in coughing and has more \"gurgling\" that she notices on expiration. She has not been able to cough up sputum, but does have dyspnea on exertion when she gets up to walk to the restroom. She has not ambulated the hallways since being admitted, but has been using her IS. Patient did have one episode of urinary incontinence last night and her last bowel movement was on . She states that she still feels some weakness in her legs, but no weakness is described in upper extremities by patient.     Significant labs: WBC went up to 11.6 and then back down to 10.1 this morning (87% Neutrophils); Hgb 12.1-->9.9; glucose 202; UA negative.    Significant imaging:   CXR: patchy bilateral interstitial opacities (increased from CXR on admission) w/ possible small bilateral pleural effusions.      Principal Problem:    Unilateral " neuralgiform headache POA: Unknown  Active Problems:    Multiple sclerosis (CMS-[facility])      Overview: Pt has hx of MS since the age of 22, started as Seizures managed with       Phenobarbital and diagnosed 10 yrs later as MS      Hx of worst episode 12 yrs ago requiring very high doses of Steroids (1200       mg ) * 5days      Was in remission for 10 yrs      Symptoms recurred since Thursday  (May 18th 2017)      Leg cramps, heaviness of both legs, followed by severe headache unilateral       left side temporal region      Bladder control lost since yesterday          Pneumonia likely 2/2 aspiration    Hyperlipidemia POA: Unknown    Depression POA: Unknown    History of cardiac arrhythmia POA: Unknown    Hyperglycemia POA: Unknown    Neuropathy of both feet  Resolved Problems:    * No resolved hospital problems. *      Review of Systems   Constitutional: Negative for fever, chills, weight loss, malaise/fatigue and diaphoresis.   HENT: Negative for congestion, ear pain, hearing loss, sore throat and tinnitus.    Eyes: Negative for blurred vision, double vision, photophobia and pain.   Respiratory: Positive for cough, shortness of breath and wheezing. Negative for sputum production.    Cardiovascular: Negative for chest pain and leg swelling.   Gastrointestinal: Negative for nausea, vomiting, abdominal pain, diarrhea and constipation.   Genitourinary: Negative for dysuria, urgency, frequency and hematuria.        Incontinence   Musculoskeletal: Negative for myalgias, back pain, joint pain and neck pain.        Complains of bilateral lower extremity weakness.   Skin: Negative for rash.   Neurological: Positive for headaches. Negative for dizziness, tingling, sensory change, speech change, focal weakness and seizures.   Psychiatric/Behavioral: Positive for depression.       Consultants/Specialty  Neurology    Disposition  Inpatient for intractable headache and to rule out temporal arteritis.     Quality Measures  EKG  reviewed, Labs reviewed, Medications reviewed and Radiology images reviewed  Schmitt catheter: No Schmitt      DVT Prophylaxis: Enoxaparin (Lovenox)    Ulcer prophylaxis: No  Antibiotics: Treating active infection/contamination beyond 24 hours perioperative coverage            Physical Exam       Filed Vitals:    05/25/17 0415 05/25/17 0800 05/25/17 1200 05/25/17 1215   BP: 104/52 112/48 107/60    Pulse: 62 68 66    Temp: 36.9 °C (98.4 °F) 37.7 °C (99.8 °F) 37.5 °C (99.5 °F)    Resp: 16 21 18    Height:       Weight:       SpO2: 90% 92% 88% 93%     Body mass index is 29.55 kg/(m^2).    Oxygen Therapy:  Pulse Oximetry: 93 %, O2 (LPM): 3, O2 Delivery: Nasal Cannula    Physical Exam   Constitutional: She is oriented to person, place, and time and well-developed, well-nourished, and in no distress.   HENT:   Head: Normocephalic and atraumatic.   Temporal artery palpation is nontender. Pulse is felt.    Eyes: EOM are normal. Pupils are equal, round, and reactive to light. Right eye exhibits no discharge. Left eye exhibits no discharge.   Left eye ptosis.    Neck: Normal range of motion.   Cardiovascular: Normal rate, regular rhythm, normal heart sounds and intact distal pulses.  Exam reveals no gallop and no friction rub.    No murmur heard.  Pulmonary/Chest: Effort normal and breath sounds normal. No respiratory distress. She has no wheezes.   Abdominal: Soft. Bowel sounds are normal. She exhibits no distension. There is no tenderness.   Musculoskeletal: Normal range of motion. She exhibits no edema.   Lymphadenopathy:     She has no cervical adenopathy.   Neurological: She is alert and oriented to person, place, and time. No cranial nerve deficit.   5/5 strength in bilateral upper and lower extremities. Equal sensation noted in bilateral extremities.    Skin: Skin is warm and dry. No rash noted.         Lab Data Review:      5/25/2017  1:53 PM    Recent Labs      05/22/17   2318  05/23/17   0822  05/24/17   0247   05/25/17 0246   SODIUM  134*   --   130*  133*   POTASSIUM  3.8   --   4.5  4.7   CHLORIDE  101   --   102  104   CO2  21   --   19*  20   BUN  20   --   14  14   CREATININE  1.25   --   0.84  0.88   MAGNESIUM   --   2.2   --   2.1   CALCIUM  9.2   --   9.0  8.5       Recent Labs      05/22/17 2318 05/24/17 0247 05/25/17 0246   ALTSGPT  22  41   --    ASTSGOT  27  49*   --    ALKPHOSPHAT  105*  115*   --    TBILIRUBIN  0.7  0.4   --    GLUCOSE  125*  201*  202*       Recent Labs      05/24/17 0247 05/24/17 1426 05/25/17 0246   RBC  3.18*  3.26*  3.04*   HEMOGLOBIN  10.4*  10.6*  9.9*   HEMATOCRIT  30.3*  32.0*  29.6*   PLATELETCT  210  220  231       Recent Labs      05/22/17 2318 05/24/17 0247 05/24/17 1426 05/25/17 0246   WBC  6.8  8.6  11.6*  10.1   NEUTSPOLYS  77.00*   --   86.10*  87.60*   LYMPHOCYTES  14.00*   --   12.20*  4.40*   MONOCYTES  3.00   --   0.80  2.70   EOSINOPHILS  0.00   --   0.00  0.00   BASOPHILS  0.00   --   0.00  0.90   ASTSGOT  27  49*   --    --    ALTSGPT  22  41   --    --    ALKPHOSPHAT  105*  115*   --    --    TBILIRUBIN  0.7  0.4   --    --            Assessment/Plan     1. Headache  Ddx: Trigeminal neuralgia vs. Temporal arteritis   -Sharp, electric-like left temporal headache (10/10 on admission-->1/10 pain today w/ no severe electric-like pain since 5/24 evening).    -Positive for fever. Did have chills and diaphoresis, but not today. SOB, cough, and urinary incontinence. Negative today for N/V (though was present prior to admission).    -Denies having this type of HA in past. Hx of migraines.    -Labs--> ESR: 114 on admission (140 in [facility])-->114 on 5/24, CRP: 48, Hgb 12.1-->10.4-->9.9  -CT head showed no acute abnormalities  -MR of brain and spinal cord showed no new contrast enhancements, but did show chronic plaques related to hx of MS.    - Significant ESR w/ underlying dull headache and fever might suggest temporal arteritis, but  temporal artery pulse was palpable and non-tender. No associated vision changes were present. Patient has had lower extremity weakness subjectively w/ normal motor function and strength on physical exam. Interestingly, this is seen in polymyalgia rheumatic often and PMR is seen in 50% of patients with GCA. Patient's description of 10/10, sharp, electric pain on admission however suggests trigeminal neuralgia.   Plan  -As patient continues to have dull temporal headache w/ resolution of sharp, electric-like pain, it is warranted to do a temporal artery biopsy. Will consult vascular surgery.  -Continue IV solu-medrol 500 mg BID X3 days (today is day 2).  -Continue carbamazepine 200 mg BID for trigeminal neuralgia coverage.   -Trend ESR to follow clinical status.   -Add daily aspirin 81 mg to treatment regimen to cover for temporal arteritis.   -Will switch pain medication to oxy 2.5 mg Q 4hrs PRN as patient had been drowsy.  -Continue fall, seizure, and aspiration precautions.    -Continue patient's lyrica and tizandine.  -Continue antiemetic medications as needed.     2. Multiple sclerosis  -Continue oxybutinin for incontinence.  -Obtain records from Dr. Asmita Pollard via [place] [facility].   -Will continue IV solumedrol 500 mg BID  -Continue patient's home Tecfidera.     3. Pneumonia 2/2 aspiration  -Fever of 102.4, WBC 11.6-->10.1.  - Repeat CXR: patchy bilateral interstitial opacities, which are increased from original Cxr.   -SOB and dyspnea on exertion. Coughing. On 3L NC.   -Denies dysuria, hematuria, or increased frequency. Does have baseline incontinence. Repeat UA was negative.     Plan  -Will continue RT protocol  -Continue ceftriaxone 1 g q 24 hrs IV.      4. Depression  -Continue patient's home sertraline 50 mg qhs PO    5. Hyperlipidemia  -Continue pravastatin 40 mg daily    6. Hyperglycemia  -Glucose was 202 on morning labs (likely 2/2 steroids)  -Continue ISS, hypoglycemic protocol.      * Unilateral  neuralgiform headache  Assessment & Plan  - New onset, localized primarily to LEFT fronto-temporal region, mod-severe, sharp, intermittent  - Presentation on admission was worrisome for temporal arteritis vs other neurogenic etiology  - Initial work-up:  (140 at [facility]), CRP 48    - CT Head: No acute intracranial abnormality  - MRI C/L-spine/brain: Evidence chronic demyelinating plaques of MS  - Neuro (Dr. Cosme) following >> cannot exclude Temporal Arteritis, but will continue current treatment for presumed Trigeminal Neuralgia.   - Cont Carbamazepine 200mg BID, Oxy 2.5 Q4 PRN  - Repeat ESR >> 115  - Consult Vascular surgery    Multiple sclerosis (CMS-HCC)  Assessment & Plan  - Diagnosed 34 years ago and followed by [facility] (Dr. Asmita Pollard)  - Has had hx of flare requiring high dose IV Steroids x5 days, chronic urinary incontinence  - Neuro (Dr. Cosme) consulted >> IV Solumedrol 500mg BID, Tecfidera  - Cont Oxybutynin for incontinence    Pneumonia likely 2/2 aspiration  Assessment & Plan  - x2 febrile recording 101.1, 102.4 >> currently afebrile  - CXR showed bilateral opacities, Urinalysis negative for infection  - Cont RT protocol, IV CTX    Hyperlipidemia  Assessment & Plan  - Cont Pravastatin 40mg QD    Depression  Assessment & Plan  - Cont Sertraline 50mg QD    History of cardiac arrhythmia  Assessment & Plan  - No evidence of current abnormality  - EKG on admission sinus haylee, HR 58, QTc 448, stable on overnight tele  - Can cont home Metoprolol 25mg BID (with holding parameters if HR < 50)    Hyperglycemia  Assessment & Plan  - Glucose on admission 125; 5/24 >> 201  - Likely exacerbated by IV Steroids  - ISS, Hypoglycemic protocol, POC     Neuropathy of both feet  Assessment & Plan  - Cont Lyrica 75mg BID, Tizanidine 2mg BID

## 2017-05-26 ENCOUNTER — APPOINTMENT (OUTPATIENT)
Dept: RADIOLOGY | Facility: MEDICAL CENTER | Age: 57
DRG: 040 | End: 2017-05-26
Attending: INTERNAL MEDICINE
Payer: MEDICARE

## 2017-05-26 LAB
ANION GAP SERPL CALC-SCNC: 10 MMOL/L (ref 0–11.9)
ANION GAP SERPL CALC-SCNC: 11 MMOL/L (ref 0–11.9)
BASOPHILS # BLD AUTO: 0 % (ref 0–1.8)
BASOPHILS # BLD: 0 K/UL (ref 0–0.12)
BUN SERPL-MCNC: 16 MG/DL (ref 8–22)
BUN SERPL-MCNC: 17 MG/DL (ref 8–22)
CALCIUM SERPL-MCNC: 8 MG/DL (ref 8.5–10.5)
CALCIUM SERPL-MCNC: 8.4 MG/DL (ref 8.5–10.5)
CHLORIDE SERPL-SCNC: 104 MMOL/L (ref 96–112)
CHLORIDE SERPL-SCNC: 104 MMOL/L (ref 96–112)
CO2 SERPL-SCNC: 18 MMOL/L (ref 20–33)
CO2 SERPL-SCNC: 19 MMOL/L (ref 20–33)
CREAT SERPL-MCNC: 0.91 MG/DL (ref 0.5–1.4)
CREAT SERPL-MCNC: 0.94 MG/DL (ref 0.5–1.4)
EKG IMPRESSION: NORMAL
EOSINOPHIL # BLD AUTO: 0 K/UL (ref 0–0.51)
EOSINOPHIL NFR BLD: 0 % (ref 0–6.9)
ERYTHROCYTE [DISTWIDTH] IN BLOOD BY AUTOMATED COUNT: 49.1 FL (ref 35.9–50)
FLUAV H1 2009 PAND RNA SPEC QL NAA+PROBE: NOT DETECTED
FLUAV RNA SPEC QL NAA+PROBE: NEGATIVE
FLUBV RNA SPEC QL NAA+PROBE: NEGATIVE
GFR SERPL CREATININE-BSD FRML MDRD: >60 ML/MIN/1.73 M 2
GFR SERPL CREATININE-BSD FRML MDRD: >60 ML/MIN/1.73 M 2
GLUCOSE BLD-MCNC: 138 MG/DL (ref 65–99)
GLUCOSE BLD-MCNC: 151 MG/DL (ref 65–99)
GLUCOSE BLD-MCNC: 171 MG/DL (ref 65–99)
GLUCOSE BLD-MCNC: 185 MG/DL (ref 65–99)
GLUCOSE SERPL-MCNC: 181 MG/DL (ref 65–99)
GLUCOSE SERPL-MCNC: 192 MG/DL (ref 65–99)
HCT VFR BLD AUTO: 28.8 % (ref 37–47)
HGB BLD-MCNC: 9.5 G/DL (ref 12–16)
LACTATE BLD-SCNC: 2.4 MMOL/L (ref 0.5–2)
LACTATE BLD-SCNC: 2.4 MMOL/L (ref 0.5–2)
LACTATE BLD-SCNC: 2.9 MMOL/L (ref 0.5–2)
LV EJECT FRACT  99904: 60
LV EJECT FRACT MOD 2C 99903: 60.01
LV EJECT FRACT MOD 4C 99902: 61.25
LV EJECT FRACT MOD BP 99901: 61.57
LYMPHOCYTES # BLD AUTO: 0.51 K/UL (ref 1–4.8)
LYMPHOCYTES NFR BLD: 4.3 % (ref 22–41)
MAGNESIUM SERPL-MCNC: 1.9 MG/DL (ref 1.5–2.5)
MANUAL DIFF BLD: ABNORMAL
MCH RBC QN AUTO: 32.2 PG (ref 27–33)
MCHC RBC AUTO-ENTMCNC: 33 G/DL (ref 33.6–35)
MCV RBC AUTO: 97.6 FL (ref 81.4–97.8)
MONOCYTES # BLD AUTO: 0.11 K/UL (ref 0–0.85)
MONOCYTES NFR BLD AUTO: 0.9 % (ref 0–13.4)
MORPHOLOGY BLD-IMP: NORMAL
MRSA DNA SPEC QL NAA+PROBE: NORMAL
MYELOCYTES NFR BLD MANUAL: 0.9 %
NEUTROPHILS # BLD AUTO: 11.08 K/UL (ref 2–7.15)
NEUTROPHILS NFR BLD: 81.7 % (ref 44–72)
NEUTS BAND NFR BLD MANUAL: 12.2 % (ref 0–10)
NRBC # BLD AUTO: 0 K/UL
NRBC BLD AUTO-RTO: 0 /100 WBC
OVALOCYTES BLD QL SMEAR: NORMAL
PLATELET # BLD AUTO: 241 K/UL (ref 164–446)
PLATELET BLD QL SMEAR: NORMAL
PMV BLD AUTO: 11.3 FL (ref 9–12.9)
POIKILOCYTOSIS BLD QL SMEAR: NORMAL
POTASSIUM SERPL-SCNC: 4 MMOL/L (ref 3.6–5.5)
POTASSIUM SERPL-SCNC: 4.2 MMOL/L (ref 3.6–5.5)
RBC # BLD AUTO: 2.95 M/UL (ref 4.2–5.4)
RBC BLD AUTO: PRESENT
SIGNIFICANT IND 70042: NORMAL
SITE SITE: NORMAL
SODIUM SERPL-SCNC: 133 MMOL/L (ref 135–145)
SODIUM SERPL-SCNC: 133 MMOL/L (ref 135–145)
SOURCE SOURCE: NORMAL
TOXIC GRANULES BLD QL SMEAR: SLIGHT
VANCOMYCIN SERPL-MCNC: 13.4 UG/ML
WBC # BLD AUTO: 11.8 K/UL (ref 4.8–10.8)

## 2017-05-26 PROCEDURE — 700102 HCHG RX REV CODE 250 W/ 637 OVERRIDE(OP): Performed by: INTERNAL MEDICINE

## 2017-05-26 PROCEDURE — 85007 BL SMEAR W/DIFF WBC COUNT: CPT

## 2017-05-26 PROCEDURE — 700111 HCHG RX REV CODE 636 W/ 250 OVERRIDE (IP): Performed by: PHARMACIST

## 2017-05-26 PROCEDURE — A9270 NON-COVERED ITEM OR SERVICE: HCPCS | Performed by: INTERNAL MEDICINE

## 2017-05-26 PROCEDURE — 85027 COMPLETE CBC AUTOMATED: CPT

## 2017-05-26 PROCEDURE — 160028 HCHG SURGERY MINUTES - 1ST 30 MINS LEVEL 3: Performed by: SURGERY

## 2017-05-26 PROCEDURE — 700101 HCHG RX REV CODE 250

## 2017-05-26 PROCEDURE — 99153 MOD SED SAME PHYS/QHP EA: CPT | Performed by: SURGERY

## 2017-05-26 PROCEDURE — 770022 HCHG ROOM/CARE - ICU (200)

## 2017-05-26 PROCEDURE — 700111 HCHG RX REV CODE 636 W/ 250 OVERRIDE (IP)

## 2017-05-26 PROCEDURE — 700102 HCHG RX REV CODE 250 W/ 637 OVERRIDE(OP): Performed by: HOSPITALIST

## 2017-05-26 PROCEDURE — 500002 HCHG ADHESIVE, DERMABOND: Performed by: SURGERY

## 2017-05-26 PROCEDURE — 03BT0ZX EXCISION OF LEFT TEMPORAL ARTERY, OPEN APPROACH, DIAGNOSTIC: ICD-10-PCS | Performed by: SURGERY

## 2017-05-26 PROCEDURE — 83735 ASSAY OF MAGNESIUM: CPT

## 2017-05-26 PROCEDURE — 500698 HCHG HEMOCLIP, MEDIUM: Performed by: SURGERY

## 2017-05-26 PROCEDURE — 501423 HCHG SPONGE, SURGIFOAM 12X7: Performed by: SURGERY

## 2017-05-26 PROCEDURE — 700105 HCHG RX REV CODE 258: Performed by: PHARMACIST

## 2017-05-26 PROCEDURE — 502240 HCHG MISC OR SUPPLY RC 0272: Performed by: SURGERY

## 2017-05-26 PROCEDURE — G8996 SWALLOW CURRENT STATUS: HCPCS | Mod: CL

## 2017-05-26 PROCEDURE — 93306 TTE W/DOPPLER COMPLETE: CPT

## 2017-05-26 PROCEDURE — 160048 HCHG OR STATISTICAL LEVEL 1-5: Performed by: SURGERY

## 2017-05-26 PROCEDURE — G8997 SWALLOW GOAL STATUS: HCPCS | Mod: CH

## 2017-05-26 PROCEDURE — 93306 TTE W/DOPPLER COMPLETE: CPT | Mod: 26 | Performed by: INTERNAL MEDICINE

## 2017-05-26 PROCEDURE — 99152 MOD SED SAME PHYS/QHP 5/>YRS: CPT | Performed by: SURGERY

## 2017-05-26 PROCEDURE — 160039 HCHG SURGERY MINUTES - EA ADDL 1 MIN LEVEL 3: Performed by: SURGERY

## 2017-05-26 PROCEDURE — A9270 NON-COVERED ITEM OR SERVICE: HCPCS | Performed by: PSYCHIATRY & NEUROLOGY

## 2017-05-26 PROCEDURE — 160036 HCHG PACU - EA ADDL 30 MINS PHASE I: Performed by: SURGERY

## 2017-05-26 PROCEDURE — 700105 HCHG RX REV CODE 258: Performed by: INTERNAL MEDICINE

## 2017-05-26 PROCEDURE — 501499 HCHG SURG-I-LOOP, MINI (BLUE): Performed by: SURGERY

## 2017-05-26 PROCEDURE — 82962 GLUCOSE BLOOD TEST: CPT

## 2017-05-26 PROCEDURE — 501838 HCHG SUTURE GENERAL: Performed by: SURGERY

## 2017-05-26 PROCEDURE — 99291 CRITICAL CARE FIRST HOUR: CPT | Performed by: INTERNAL MEDICINE

## 2017-05-26 PROCEDURE — 94640 AIRWAY INHALATION TREATMENT: CPT

## 2017-05-26 PROCEDURE — 83605 ASSAY OF LACTIC ACID: CPT | Mod: 91

## 2017-05-26 PROCEDURE — 500127 HCHG BOVIE, NEEDLE TIP 1: Performed by: SURGERY

## 2017-05-26 PROCEDURE — 80048 BASIC METABOLIC PNL TOTAL CA: CPT

## 2017-05-26 PROCEDURE — A9270 NON-COVERED ITEM OR SERVICE: HCPCS | Performed by: HOSPITALIST

## 2017-05-26 PROCEDURE — 160035 HCHG PACU - 1ST 60 MINS PHASE I: Performed by: SURGERY

## 2017-05-26 PROCEDURE — 88305 TISSUE EXAM BY PATHOLOGIST: CPT

## 2017-05-26 PROCEDURE — 700111 HCHG RX REV CODE 636 W/ 250 OVERRIDE (IP): Performed by: INTERNAL MEDICINE

## 2017-05-26 PROCEDURE — 500700 HCHG HEMOCLIP, SMALL (RED): Performed by: SURGERY

## 2017-05-26 PROCEDURE — 700102 HCHG RX REV CODE 250 W/ 637 OVERRIDE(OP): Performed by: PSYCHIATRY & NEUROLOGY

## 2017-05-26 PROCEDURE — 500122 HCHG BOVIE, BLADE: Performed by: SURGERY

## 2017-05-26 PROCEDURE — 700111 HCHG RX REV CODE 636 W/ 250 OVERRIDE (IP): Performed by: HOSPITALIST

## 2017-05-26 PROCEDURE — 80202 ASSAY OF VANCOMYCIN: CPT

## 2017-05-26 PROCEDURE — 160002 HCHG RECOVERY MINUTES (STAT): Performed by: SURGERY

## 2017-05-26 PROCEDURE — 92610 EVALUATE SWALLOWING FUNCTION: CPT

## 2017-05-26 PROCEDURE — 500043 HCHG BAG-A-JET: Performed by: SURGERY

## 2017-05-26 PROCEDURE — 94002 VENT MGMT INPAT INIT DAY: CPT

## 2017-05-26 RX ORDER — METHYLPREDNISOLONE SODIUM SUCCINATE 125 MG/2ML
62.5 INJECTION, POWDER, LYOPHILIZED, FOR SOLUTION INTRAMUSCULAR; INTRAVENOUS EVERY 8 HOURS
Status: DISCONTINUED | OUTPATIENT
Start: 2017-05-26 | End: 2017-05-29

## 2017-05-26 RX ORDER — CALCIUM CARBONATE 500(1250)
500 TABLET ORAL 2 TIMES DAILY WITH MEALS
Status: DISCONTINUED | OUTPATIENT
Start: 2017-05-26 | End: 2017-06-02 | Stop reason: HOSPADM

## 2017-05-26 RX ORDER — ACETAMINOPHEN 325 MG/1
650 TABLET ORAL EVERY 6 HOURS PRN
Status: DISCONTINUED | OUTPATIENT
Start: 2017-05-26 | End: 2017-05-26

## 2017-05-26 RX ORDER — DOXYCYCLINE 100 MG/1
100 TABLET ORAL EVERY 12 HOURS
Status: COMPLETED | OUTPATIENT
Start: 2017-05-26 | End: 2017-05-31

## 2017-05-26 RX ORDER — CARBAMAZEPINE 100 MG/5ML
200 SUSPENSION ORAL 2 TIMES DAILY
Status: DISCONTINUED | OUTPATIENT
Start: 2017-05-26 | End: 2017-05-29

## 2017-05-26 RX ORDER — OXYCODONE HYDROCHLORIDE 5 MG/1
5 TABLET ORAL EVERY 6 HOURS PRN
Status: DISCONTINUED | OUTPATIENT
Start: 2017-05-26 | End: 2017-05-26

## 2017-05-26 RX ORDER — ACETAMINOPHEN 325 MG/1
650 TABLET ORAL EVERY 4 HOURS PRN
Status: DISCONTINUED | OUTPATIENT
Start: 2017-05-26 | End: 2017-06-02 | Stop reason: HOSPADM

## 2017-05-26 RX ORDER — BUPIVACAINE HYDROCHLORIDE AND EPINEPHRINE 5; 5 MG/ML; UG/ML
INJECTION, SOLUTION EPIDURAL; INTRACAUDAL; PERINEURAL
Status: DISCONTINUED | OUTPATIENT
Start: 2017-05-26 | End: 2017-05-26 | Stop reason: HOSPADM

## 2017-05-26 RX ORDER — OXYCODONE HYDROCHLORIDE 5 MG/1
5 TABLET ORAL EVERY 6 HOURS PRN
Status: DISCONTINUED | OUTPATIENT
Start: 2017-05-26 | End: 2017-06-02 | Stop reason: HOSPADM

## 2017-05-26 RX ADMIN — PREGABALIN 75 MG: 75 CAPSULE ORAL at 09:00

## 2017-05-26 RX ADMIN — OXYCODONE HYDROCHLORIDE 5 MG: 5 TABLET ORAL at 18:43

## 2017-05-26 RX ADMIN — STANDARDIZED SENNA CONCENTRATE AND DOCUSATE SODIUM 2 TABLET: 8.6; 5 TABLET, FILM COATED ORAL at 09:03

## 2017-05-26 RX ADMIN — PIPERACILLIN SODIUM AND TAZOBACTAM SODIUM 4.5 G: 4; .5 INJECTION, POWDER, FOR SOLUTION INTRAVENOUS at 05:00

## 2017-05-26 RX ADMIN — STANDARDIZED SENNA CONCENTRATE AND DOCUSATE SODIUM 2 TABLET: 8.6; 5 TABLET, FILM COATED ORAL at 21:24

## 2017-05-26 RX ADMIN — VANCOMYCIN HYDROCHLORIDE 1100 MG: 100 INJECTION, POWDER, LYOPHILIZED, FOR SOLUTION INTRAVENOUS at 23:24

## 2017-05-26 RX ADMIN — CARBAMAZEPINE 200 MG: 100 SUSPENSION ORAL at 21:25

## 2017-05-26 RX ADMIN — METHYLPREDNISOLONE SODIUM SUCCINATE 62.5 MG: 125 INJECTION, POWDER, FOR SOLUTION INTRAMUSCULAR; INTRAVENOUS at 05:53

## 2017-05-26 RX ADMIN — PIPERACILLIN SODIUM AND TAZOBACTAM SODIUM 4.5 G: 4; .5 INJECTION, POWDER, FOR SOLUTION INTRAVENOUS at 18:43

## 2017-05-26 RX ADMIN — TIZANIDINE 2 MG: 4 TABLET ORAL at 21:23

## 2017-05-26 RX ADMIN — INSULIN LISPRO 2 UNITS: 100 INJECTION, SOLUTION INTRAVENOUS; SUBCUTANEOUS at 05:54

## 2017-05-26 RX ADMIN — Medication 500 MG: at 07:52

## 2017-05-26 RX ADMIN — ENOXAPARIN SODIUM 40 MG: 100 INJECTION SUBCUTANEOUS at 09:04

## 2017-05-26 RX ADMIN — CARBAMAZEPINE 200 MG: 100 SUSPENSION ORAL at 11:27

## 2017-05-26 RX ADMIN — TIZANIDINE 2 MG: 4 TABLET ORAL at 10:17

## 2017-05-26 RX ADMIN — PREGABALIN 75 MG: 75 CAPSULE ORAL at 21:23

## 2017-05-26 RX ADMIN — METHYLPREDNISOLONE SODIUM SUCCINATE 62.5 MG: 125 INJECTION, POWDER, FOR SOLUTION INTRAMUSCULAR; INTRAVENOUS at 14:05

## 2017-05-26 RX ADMIN — MAGNESIUM SULFATE IN DEXTROSE 1 G: 10 INJECTION, SOLUTION INTRAVENOUS at 01:47

## 2017-05-26 RX ADMIN — ACETAMINOPHEN 650 MG: 325 TABLET, FILM COATED ORAL at 12:12

## 2017-05-26 RX ADMIN — VANCOMYCIN HYDROCHLORIDE 1100 MG: 100 INJECTION, POWDER, LYOPHILIZED, FOR SOLUTION INTRAVENOUS at 12:12

## 2017-05-26 RX ADMIN — METOPROLOL TARTRATE 25 MG: 25 TABLET, FILM COATED ORAL at 09:03

## 2017-05-26 RX ADMIN — PIPERACILLIN SODIUM AND TAZOBACTAM SODIUM 4.5 G: 4; .5 INJECTION, POWDER, FOR SOLUTION INTRAVENOUS at 22:17

## 2017-05-26 RX ADMIN — OXYCODONE HYDROCHLORIDE 5 MG: 5 TABLET ORAL at 09:40

## 2017-05-26 RX ADMIN — INSULIN LISPRO 2 UNITS: 100 INJECTION, SOLUTION INTRAVENOUS; SUBCUTANEOUS at 22:22

## 2017-05-26 RX ADMIN — METHYLPREDNISOLONE SODIUM SUCCINATE 62.5 MG: 125 INJECTION, POWDER, FOR SOLUTION INTRAMUSCULAR; INTRAVENOUS at 22:16

## 2017-05-26 RX ADMIN — METOPROLOL TARTRATE 25 MG: 25 TABLET, FILM COATED ORAL at 21:24

## 2017-05-26 RX ADMIN — DOXYCYCLINE 100 MG: 100 TABLET ORAL at 10:17

## 2017-05-26 RX ADMIN — MAGNESIUM HYDROXIDE 30 ML: 400 SUSPENSION ORAL at 09:10

## 2017-05-26 RX ADMIN — INSULIN LISPRO 2 UNITS: 100 INJECTION, SOLUTION INTRAVENOUS; SUBCUTANEOUS at 11:28

## 2017-05-26 RX ADMIN — SERTRALINE 50 MG: 50 TABLET, FILM COATED ORAL at 21:24

## 2017-05-26 RX ADMIN — DOXYCYCLINE 100 MG: 100 TABLET ORAL at 21:22

## 2017-05-26 RX ADMIN — BISACODYL 10 MG: 10 SUPPOSITORY RECTAL at 21:41

## 2017-05-26 RX ADMIN — PRAVASTATIN SODIUM 40 MG: 20 TABLET ORAL at 21:22

## 2017-05-26 RX ADMIN — INSULIN LISPRO 2 UNITS: 100 INJECTION, SOLUTION INTRAVENOUS; SUBCUTANEOUS at 00:41

## 2017-05-26 RX ADMIN — Medication 500 MG: at 18:44

## 2017-05-26 RX ADMIN — PIPERACILLIN SODIUM AND TAZOBACTAM SODIUM 4.5 G: 4; .5 INJECTION, POWDER, FOR SOLUTION INTRAVENOUS at 10:00

## 2017-05-26 ASSESSMENT — PAIN SCALES - GENERAL
PAINLEVEL_OUTOF10: 4
PAINLEVEL_OUTOF10: 0
PAINLEVEL_OUTOF10: 4
PAINLEVEL_OUTOF10: 7
PAINLEVEL_OUTOF10: 6
PAINLEVEL_OUTOF10: 1
PAINLEVEL_OUTOF10: 0
PAINLEVEL_OUTOF10: 5
PAINLEVEL_OUTOF10: 3
PAINLEVEL_OUTOF10: 2
PAINLEVEL_OUTOF10: 6

## 2017-05-26 ASSESSMENT — PULMONARY FUNCTION TESTS
EPAP_CMH2O: 8
EPAP_CMH2O: 8

## 2017-05-26 NOTE — CONSULTS
DATE OF SERVICE:  05/25/2017    DATE OF SERVICE:  05/25/2017    REQUESTING PHYSICIAN:  Jose Ivan MD.    CONSULTING PHYSICIAN:  Jonny Haley MD    TYPE OF CONSULTATION:  Pulmonary medicine and critical care medicine.    REASON FOR CONSULTATION:  Evaluation and management of respiratory failure,   sepsis, pneumonia.    CHIEF COMPLAINT:  Cough with dyspnea.    HISTORY OF PRESENT ILLNESS:  I was kindly asked by Dr. Ivan to see and   evaluate this lady regarding the above problems.  This is a 56-year-old lady   who was admitted to [facility] on or about 05/22/2017.    She presented with an elevated Westergren sed rate, headache, nausea and   vomiting.  She was felt to possibly have temporal arteritis.  She has a   history of multiple sclerosis.  She was started on high dose   methylprednisolone.  She was seen by neurology.  The plan is for her to have a   temporal artery biopsy tomorrow.  Today, I was called because of tachypnea,   fever and increasing oxygen requirements.  She has now been transferred to the   intensive care unit.    This lady has a dry cough.  She generally does not feel well.  She has no   sputum production.  She denies chest pain or chest pressure.  She has no   nausea, vomiting, abdominal pain or diarrhea.  No dysuria, urgency, frequency   or hematuria.  She tells me that when she was initially admitted, she had a   dry cough, but she was not short of breath.  She has been feeling worse since   admission.    Yesterday, she was noted to have a fever of 38.4, blood cultures were   obtained.  She was placed on ceftriaxone and doxycycline.  It was felt that   she may have pneumonia.  She generally does not feel well.  She has myalgias   and arthralgias.  She does not have joint swelling.  She has had no recent   sick contacts.  She has a dog and 2 pussy cats at home.  These are not new   pets.    CURRENT MEDICATIONS:  She is on carbamazepine 200 mg twice  a day, doxycycline   100 mg every 12 hours, enoxaparin 40 mg a day, sliding scale Humalog insulin,   methylprednisolone 500 mg every 12 hours, metoprolol 25 mg twice a day,   oxybutynin SR 10 mg a day, ceftriaxone 2 g daily, pravastatin 40 mg a day,   pregabalin 75 mg twice a day, sertraline 50 mg at bedtime, terfenadine 2 mg   twice a day.    ALLERGIES:  SHE IS INTOLERANT TO TRICYCLIC ANTIDEPRESSANTS.    PAST SURGICAL HISTORY:  She has had a right oophorectomy for a hemorrhagic   cyst.    ILLNESSES:  She has a history of seizures and multiple sclerosis.    SOCIAL HISTORY:  She occasionally uses marijuana.  She does not smoke tobacco   or abuse alcohol.    FAMILY HISTORY:  Noncontributory.    REVIEW OF SYSTEMS:  The AMA and CMS system review does not reveal any   significant positive findings.    PHYSICAL EXAMINATION:  VITAL SIGNS:  Her temperature is 39.4, blood pressure 136/56, heart rate 93,   respiratory rate is 32.  GENERAL:  She is a tachypneic lady.  HEENT:  Normocephalic, atraumatic.  Sinuses are nontender.  Nares patent.    Oropharynx with moist mucous membranes.  NECK:  Trachea midline, supple.  CHEST:  Symmetrical.  HEART:  Regular rhythm.  LUNGS:  She is tachypneic.  She has scattered fine crackles bilaterally with   bronchial breath sounds at the right lung base.  ABDOMEN:  Soft, nondistended, nontender.  No masses.  EXTREMITIES:  No clubbing, cyanosis or edema.  SKIN:  No rashes or lesions.  NEUROLOGIC:  Awake, alert, oriented to person, place and time.  Cranial nerves   II-XII grossly intact.    DIAGNOSTIC DATA:  Her white blood cell count is 15,900, hemoglobin 10.7,   hematocrit 32.4, platelet count 270,000.  Her sodium is 130, potassium 4.5,   chloride 99, CO2 of 20, BUN 17, creatinine 1.06, glucose 192, AST 51, alkaline   phosphatase 131.  Lactic acid 3.8.  Urinalysis with 2-5 white blood cells per   high powered field.  Arterial blood gas shows a pH of 7.45, pCO2 of 26, pO2   of 59.  Blood  cultures are negative from yesterday.  Chest x-ray shows   worsening opacification in both lungs.    IMPRESSION:  1.  Acute hypoxemic respiratory failure.  2.  Pneumonia.  3.  Sepsis, pulmonary source.  4.  Anemia.  5.  Hyponatremia.  7.  Query temporal arteritis.  She is on high dose methylprednisolone per   neurology.  8.  History of multiple sclerosis.  9.  History of seizures.    PLAN/MEDICAL DECISION MAKING:  This lady is critically ill.  She has now been   appropriately transferred to the intensive care unit.  Her blood cultures are   negative.  She has been placed on ceftriaxone and doxycycline.  I am going to   expand her antimicrobial chemotherapy.  I am going to stop her ceftriaxone and   instead place her on intravenous piperacillin/tazobactam as well as   vancomycin.  We will culture her sputum.  She is going to be placed on the   sepsis protocol.  We will give her the appropriate amount of intravenous   crystalloid solution, 30 mL per kilogram.  We will continue intravenous   hydration and trend her lactic acid.  Her hemoglobin and electrolytes will be   followed very closely.  At the current time, her prognosis is quite guarded   and she is critically ill.  She has required increasing amounts of oxygen.    She is tachypneic.  She is certainly at high risk for further deterioration   and the need for intubation and mechanical ventilatory support.  I have spent   a total of 90 minutes providing direct critical care services at the bedside.    There has been no time overlap.  The time spent excludes the time spent   performing procedures (50145, [id number]).    The case has been reviewed with the medical residents, nursing, and   respiratory therapy.    Thank you Dr. Ivan for allowing us to participate in the care of this   lady.  We will continue to follow her with great interest.       ____________________________________     MD RUBY WYNN / PAYAL    DD:  05/25/2017 22:16:01  DT:   05/25/2017 23:14:12    D#:  [id number]  Job#:  [id number]    cc: Navin Cosme MD, CHANDA YBARRA MD

## 2017-05-26 NOTE — PROGRESS NOTES
Patients girlfriend, Jonas, was called and updated on patients medical condition.     [phone]  CELL    [phone]  HOME

## 2017-05-26 NOTE — RESPIRATORY CARE
Respiratory Rapid Response Note    Symptoms respiratory failure   #SVN Performed: Yes (05/25/17 1353)  Breath Sounds  Pre/Post Intervention: Pre Intervention Assessment (05/25/17 1922)  RUL Breath Sounds: Clear (05/25/17 1922)  RML Breath Sounds: Diminished (05/25/17 1922)  RLL Breath Sounds: Diminished (05/25/17 1922)  JOCELYNE Breath Sounds: Clear (05/25/17 1922)  LLL Breath Sounds: Diminished (05/25/17 1922)    O2 (LPM): 8 (05/25/17 1922)  O2 Daily Delivery Respiratory : OxyMask (05/25/17 1922)    Events/Summary/Plan: RRT for respiratory failure. placed pt on oxymask 10L SPO2 was at 91 and titrated when pt was given tylenol per RN.  (05/25/17 1922)

## 2017-05-26 NOTE — PROGRESS NOTES
ICU Progress Note:    This is 55 yo female with PMHx of multiple sclerosis, DLD, migraine initially admitted for MS flare vs temporal arteritis vs trigeminal neuralgia. Patient was started on steroids and carbamazepine. Surgery was consulted for temporal artery biopsy. Two days after admission, patient spiked fever. She was started on ceftriaxone, doxycycline. Rapid response was called today evening due to worsening respiratory status and she was then transferred to ICU.   Patient states she hs been having flu like symptoms which usually happens with MS flare. She was also having dry cough. Denies sick contact. Patient complaints of dyspnea with mild exertion.  Patient is in mild respiratory distress. Able to complete full sentence. Rhonchi on exam    Sepsis secondary to pulmonary source  Acute hypoxic respiratory failure  - SIRS: 3(fever, tachypnea, leukocytosis), lactic acid : 3.8. BP stable  - CXR: bilateral pulmonary infiltrates  - Blood cx;17: NGTD. AB.45/26/58.5  - pending UA, influenza PCR, MRSA PCR, sputum C&S  - started on sepsis protocol, vancomycin, zosyn, doxycycline  - supplemental oxygen, RT protocol  - de escalate antibiotics based on MRSA result.  - trend lactic acid  - closely monitor in ICU.    Elevated liver enzymes  - patient denies abdominal symptoms  - closely monitor as patient is on carbamazepine    MS flare  Unilateral headache  - on iv solumedrol 500 mg bid, last dose today  - on carbamazepine for possible trigeminal neuralgia  - vascular surgery was consulted for temporal artery biopsy.  - neurology on board

## 2017-05-26 NOTE — PROGRESS NOTES
Cortrak Placement    Tube Team verified patient name and medical record number prior to tube placement.  Cortrak tube (55 inches, 10 Turkish) placed at 70 cm in left nare.  Per Cortrak picture, tube appears to be in the stomach.  Nursing Instructions: Awaiting KUB to confirm placement before use for medications or feeding. Once placement confirmed, flush tube with 30 ml of water, and then remove and save stylet, in patient medication drawer.

## 2017-05-26 NOTE — CARE PLAN
Problem: Pain Management  Goal: Pain level will decrease to patient’s comfort goal  Outcome: PROGRESSING AS EXPECTED  Intervention: Follow pain managment plan developed in collaboration with patient and Interdisciplinary Team  Pt educated on pain rating scale. Prn pain medications administered as needed. See mar. Non pharmochologic pain interventions utilized (distraction, rest, and repositioning).      Problem: Respiratory:  Goal: Respiratory status will improve  Outcome: PROGRESSING SLOWER THAN EXPECTED  Intervention: Assess and monitor pulmonary status  Pt's respiratory status assessed. continuous pulse oximetry in use. Head of bed elevated. Collaboration with RT. Ambu bag at bedside. Pt encouraged to use incentive spirometer. Pt encouraged to self suction as needed. High flow nasal cannula in use. Oxygen titrated as needed.

## 2017-05-26 NOTE — PROGRESS NOTES
Pulmonary Critical Care Progress Note      Date of service: 5/26/2017    Chief Complaint: Multiple sclerosis flare up.     History of Present Illness: I was kindly asked by Dr. Ivan to see and evaluate this lady regarding the above problems.  This is a 56-year-old lady who was admitted to [facility] on or about 05/22/2017.  She presented with an elevated Westergren sed rate, headache, nausea and vomiting.  She was felt to possibly have temporal arteritis.  She has a  history of multiple sclerosis.  She was started on high dose methylprednisolone.  She was seen by neurology.  The plan is for her to have a temporal artery biopsy tomorrow.  Today, I was called because of tachypnea, fever and increasing oxygen requirements.  She has now been transferred to the intensive care unit.    ROS:  Respiratory: negative shortness of breath, Cardiac: negative chest pain, GI: negative abdominal pain.  All other systems negative.    Interval Events:  24 hour interval history reviewed   Tmax 103.5 °F  +3.4L  over the last 24 hours, +5L since admit   CXR shows: bilateral multilobar opacification.   Rapid for respiratory distress overnight.   Neurologically intact. complains of pain to left temple area.    Sepsis bolus given, SR, BP has been above 100 systolic without vasopressors.   Developing edema.   1900 cc UOP overnight.       PFSH:  No change.      Physical Exam  General:  Alert and awake. Complains of a headache. On HFNC.   Neuro/Psych: alert and oriented X4.   HEENT: PERRL. Denies vision changes.   CVS: Regular rate and rhythm at 79. No vasopressors.   Respiratory: Diminished but relatively clear.   Abdomen/: Non tender to palpation. Abdomen is distended.   Extremities: Edematous throughout. Able to move all extremities at baseline and walk with walk aid.   Skin: warm and perfusing.       Respiratory:     Pulse Oximetry: 98 %  ImagingAvailable data reviewed     Recent Labs      05/25/17 1932    NPRQU77I  7.45   [id number]  26.0   [id number]  58.5*   OALE7NWN  89.4*   ARTHCO3  18   ARTBE  -5*       HemoDynamics:  Pulse: 72, Heart Rate (Monitored): 72  Blood Pressure: 136/56 mmHg, NIBP: 128/70 mmHg     Imaging: Available data reviewed     Recent Labs      05/25/17 1937   TROPONINI  0.02       Neuro:  GCS Total Gabriels Coma Score: 15  Imaging: Available data reviewed    Fluids:  Intake/Output       05/24/17 0700 - 05/25/17 0659 05/25/17 0700 - 05/26/17 0659 05/26/17 0700 - 05/27/17 0659      [id number] Total 0454-7696 6407-0760 Total 0977-2166 [id number] Total       Intake    P.O.  --  500 500  --  -- --  --  -- --    P.O. -- 500 500 -- -- -- -- -- --    I.V.  --  1100 1100  1200  3949 5149  --  -- --    IV Volume (< Enter Name Here >) -- 1100 1100 1200 -- 1200 -- -- --    IV Volume (NS) -- -- -- -- 1100 1100 -- -- --    IV Volume (Sepsis bolus) -- -- -- -- 2199 2199 -- -- --    IV Piggyback Volume (IV Piggyback) -- -- -- -- 650 650 -- -- --    Total Intake -- 1600 1600 1200 3949 5149 -- -- --       Output    Urine  --  -- --  --  1725 1725  --  -- --    Indwelling Cathether -- -- -- -- 1725 1725 -- -- --    Total Output -- -- -- -- 1725 1725 -- -- --       Net I/O     -- 1600 1600 1200 2224 3424 -- -- --           Recent Labs      05/23/17   0822   05/25/17   0246  05/25/17   1937  05/26/17   0020  05/26/17   0410   SODIUM   --    < >  133*  130*  133*  133*   POTASSIUM   --    < >  4.7  4.5  4.2  4.0   CHLORIDE   --    < >  104  99  104  104   CO2   --    < >  20  20  18*  19*   BUN   --    < >  14  17  17  16   CREATININE   --    < >  0.88  1.06  0.94  0.91   MAGNESIUM  2.2   --   2.1   --   1.9   --    CALCIUM   --    < >  8.5  9.0  8.0*  8.4*    < > = values in this interval not displayed.       GI/Nutrition:  Imaging: Available data reviewed  taking PO     Liver Function  Recent Labs      05/24/17   0247   05/25/17   1937  05/26/17   0020  05/26/17   0410   ALTSGPT  41   --   46   --    --     ASTSGOT  49*   --   51*   --    --    ALKPHOSPHAT  115*   --   131*   --    --    TBILIRUBIN  0.4   --   0.5   --    --    GLUCOSE  201*   < >  192*  181*  192*    < > = values in this interval not displayed.       Heme:  Recent Labs      17   0020   RBC  3.04*  3.34*  2.95*   HEMOGLOBIN  9.9*  10.7*  9.5*   HEMATOCRIT  29.6*  32.4*  28.8*   PLATELETCT  231  270  241       Infectious Disease:  Temp  Av.4 °C (101.1 °F)  Min: 37 °C (98.6 °F)  Max: 39.7 °C (103.5 °F)  Micro: reviewed  Recent Labs      17   0020   WBC  8.6   < >  10.1  15.9*  11.8*   NEUTSPOLYS   --    < >  87.60*  90.30*  81.70*   LYMPHOCYTES   --    < >  4.40*  2.60*  4.30*   MONOCYTES   --    < >  2.70  0.00  0.90   EOSINOPHILS   --    < >  0.00  0.00  0.00   BASOPHILS   --    < >  0.90  0.00  0.00   ASTSGOT  49*   --    --   51*   --    ALTSGPT  41   --    --   46   --    ALKPHOSPHAT  115*   --    --   131*   --    TBILIRUBIN  0.4   --    --   0.5   --     < > = values in this interval not displayed.     Current Facility-Administered Medications   Medication Dose Frequency Provider Last Rate Last Dose   • methylPREDNISolone sod succ (SOLU-MEDROL) 125 MG injection 62.5 mg  62.5 mg Q8HRS Jinny MORALES M.D.   62.5 mg at 17 0553   • calcium carbonate (OS-JAYESH 500) tablet 500 mg  500 mg BID WITH MEALS Jinny MORALES M.D.   Stopped at 175   • vancomycin 1,100 mg in  mL IVPB  15 mg/kg Q12HR Tanya Gustafson PHARMD       • albuterol (PROVENTIL) 2.5mg/0.5ml nebulizer solution 2.5 mg  2.5 mg Q4H PRN (RT) Navin Cosme M.D.   2.5 mg at 17 1358   • albuterol inhaler 2 Puff  2 Puff Q2HRS PRN Navin Cosme M.D.   2 Puff at 17 1946   • MD ALERT... vancomycin per pharmacy protocol   pharmacy to dose Ree Frazier M.D.       • acetaminophen (TYLENOL) suppository 650 mg  650 mg Q6HRS PRN Tommy García M.D.       • NS  infusion   Continuous Jinny MORALES M.D. 125 mL/hr at 05/25/17 2236     • doxycycline (VIBRAMYCIN) 100 mg in  mL IVPB  100 mg Q12HRS Jonny Laws M.D.   Stopped at 05/25/17 2337   • NS (BOLUS) infusion 1,000 mL  1,000 mL Once PRN Jonny Laws M.D.       • piperacillin-tazobactam (ZOSYN) 4.5 g in  mL IVPB  4.5 g Q6HRS Jonny Laws M.D.   Stopped at 05/26/17 0600   • insulin lispro (HUMALOG) injection 2-9 Units  2-9 Units 4X/DAY SILVANO Em M.D.   2 Units at 05/26/17 0554   • senna-docusate (PERICOLACE or SENOKOT S) 8.6-50 MG per tablet 2 Tab  2 Tab BID Ree Frazier M.D.   2 Tab at 05/25/17 2130    And   • polyethylene glycol/lytes (MIRALAX) PACKET 1 Packet  1 Packet QDAY PRN Ree Frazier M.D.        And   • magnesium hydroxide (MILK OF MAGNESIA) suspension 30 mL  30 mL QDAY PRN Ree Frazier M.D.   30 mL at 05/24/17 0825    And   • bisacodyl (DULCOLAX) suppository 10 mg  10 mg QDAY PRN Ree Frazier M.D.   10 mg at 05/25/17 0845   • Respiratory Care per Protocol   Continuous RT Ree Frazier M.D.       • enoxaparin (LOVENOX) inj 40 mg  40 mg DAILY Ree Frazier M.D.   40 mg at 05/25/17 0845   • pregabalin (LYRICA) capsule 75 mg  75 mg BID Ree Frazier M.D.   75 mg at 05/25/17 2130   • sertraline (ZOLOFT) tablet 50 mg  50 mg QHS Ree Frazier M.D.   50 mg at 05/25/17 2130   • tizanidine (ZANAFLEX) tablet 2 mg  2 mg BID Ree Frazier M.D.   2 mg at 05/25/17 2130   • oxybutynin SR (DITROPAN-XL) tablet 10 mg  10 mg Q EVENING Ree Frazier M.D.   10 mg at 05/25/17 2130   • ondansetron (ZOFRAN) syringe/vial injection 4 mg  4 mg Q4HRS PRN Ree Frazier M.D.   4 mg at 05/23/17 0524   • ondansetron (ZOFRAN ODT) dispertab 4 mg  4 mg Q4HRS PRN Ree Frazier M.D.       • promethazine (PHENERGAN) tablet 12.5-25 mg  12.5-25 mg Q4HRS PRN Ree Frazier M.D.       • promethazine (PHENERGAN) suppository 12.5-25 mg  12.5-25 mg Q4HRS PRKAYDEN Frazier M.D.       •  prochlorperazine (COMPAZINE) injection 5-10 mg  5-10 mg Q4HRS PRN Ree Frazier M.D.   10 mg at 05/23/17 0557   • pravastatin (PRAVACHOL) tablet 40 mg  40 mg Nightly Ree Frazier M.D.   40 mg at 05/24/17 2043   • carbamazepine (TEGRETOL) tablet 200 mg  200 mg BID Navin Cosme M.D.   200 mg at 05/25/17 2130   • metoprolol (LOPRESSOR) tablet 25 mg  25 mg TWICE DAILY Ree Frazier M.D.   25 mg at 05/25/17 2130     Last reviewed on 5/23/2017  7:34 PM by Debbie Nguyen [facility]    Quality  Measures:  Labs reviewed, Medications reviewed and Radiology images reviewed                       Lines:  PIV    Gtts:      Abx:  Doxycycline   Vancomycin  Zosyn    bcx 5/24 ngtd  Flu (-) 5/25    500 mg Solumedrol X3 days.       Assessment and Plan:  -  Acute hypoxemic respiratory failure.   - ? DAH vs Aspiration/Pna vs fluid (+ 7L since admit)   - HFNC -> bipap ->6L    - rt/02 protocols   - high risk for needing intubation, may benefit from elective intubatin + bronch with bal  -  Pneumonia.   - empiric broad spectrum abx   - f/u cultures  -  Sepsis, pulmonary source.  -  Query temporal arteritis.  She is on high dose methylprednisolone per    Neurology.   - pending bx today    - esr 140->114   - remains with significant headache  -  Leukocytosis   - trending down, + 12% bands   - not requiring pressors   - cultures pending   - on empiric abx coverage  -  Anemia.  -  Hyponatremia.  -  History of multiple sclerosis.  -  History of seizures.    Discussed patient condition and risk of morbidity and/or mortality with RN, RT, Pharmacy and Charge nurse / hot rounds.    The patient remains critically ill.  Critical care time = 55 minutes in directly providing and coordinating critical care and extensive data review.  No time overlap and excludes procedures.      Fabiana MONTOYA), am scribing for, and in the presence of, Vitaliy Chavarria M.D.   Electronically signed by: Fabiana Santana), 5/26/2017  Vitaliy MONTOYA M.D.   personally performed the services described in this documentation, as scribed by Fabiana Moeller in my presence, and it is both accurate and complete.

## 2017-05-26 NOTE — PROGRESS NOTES
Rapid Response called at 1900 d/t pt respiratory distress- increased requirement of 02 and 02sat of mid 80's- and increasing temperature (103.5F). [facility] García updated on pt condition: RR team and Dr. García at bedside.   Pt was transferred to ICU at 2040

## 2017-05-26 NOTE — CARE PLAN
Problem: Respiratory:  Goal: Respiratory status will improve  Intervention: Assess and monitor pulmonary status  Patient respiratory status declined from 10L oxy mask to 40L/70% FiO2 high sai oxygen delivery- patient sating 94%      Problem: Skin Integrity  Goal: Risk for impaired skin integrity will decrease  Intervention: Assess risk factors for impaired skin integrity and/or pressure ulcers  Problem: Skin Integrity  Goal: Skin Integrity is maintained or improved  Intervention: Franco Skin Risk Assessment  Franco assessed q shift-14, repositioned q 2hrs  Intervention: TURN EVERY 2 HOURS WHILE ON BEDREST  Pillows for positioning, mattress pressure 22, SCDs in place

## 2017-05-26 NOTE — PROGRESS NOTES
"Pharmacy Kinetics 56 y.o. female on vancomycin day # 2   2017    Currently on Vancomycin 1100 mg iv q12hr    Indication for Treatment:  PNA    Pertinent history per medical record: Admitted on 2017 for MS exacerbation. Patient was having severe HA and low jaw pain. CPR and ESR quite elevated. Started on solumedrol 500 mg IV BID for 3 days. Spiked a temp on  and started on C3/doxy. Rapid called last night for respiratory distress and temp to 103.5. CXR with bilateral consolidations.    Other antibiotics:  Zosyn 4.5 g IV q6h     Doxycycline 100 mg PO BID    Allergies: Tricyclic antidepressants     List concerns for renal function: none at this time    Pertinent cultures to date:   17:  Blood, peripheral x2 = NGTD  17:  MRSA nasal swab = in process    Recent Labs      17   1426  17   19317   0020   WBC  8.6  11.6*  10.1  15.9*  11.8*   NEUTSPOLYS   --   86.10*  87.60*  90.30*  81.70*   BANDSSTABS   --    --   4.40  7.10  12.20*     Recent Labs      17   02417   0020  17   0410   BUN  14  14  17  17  16   CREATININE  0.84  0.88  1.06  0.94  0.91   ALBUMIN  3.0*   --   3.0*   --    --      Recent Labs      17   0820   VANCORANDOM  13.4     Intake/Output Summary (Last 24 hours) at 17 1630  Last data filed at 17 1200   Gross per 24 hour   Intake   6024 ml   Output   2125 ml   Net   3899 ml      Blood pressure 105/46, pulse 66, temperature 37.6 °C (99.6 °F), resp. rate 22, height 1.575 m (5' 2\"), weight 73.3 kg (161 lb 9.6 oz), SpO2 95 %, not currently breastfeeding. Temp (24hrs), Av.2 °C (100.8 °F), Min:36.7 °C (98 °F), Max:39.7 °C (103.5 °F)      A/P   1. Vancomycin dose change: continue same   2. Next vancomycin level: 2-3 days (not ordered)  3. Goal trough: 16-20 mcg/mL  4. Comments:  Leukocytosis slightly better than yesterday but with new bandemia. Transferred to " ICU for monitoring. Was on HFNC this AM and now on oxymask. MRSA nasal swab pending. CXR much worse. Trough this am ok, will continue same and get level in a day or two if vanco continues.    Riaz Menjivar, PharmD, BCPS

## 2017-05-26 NOTE — PROGRESS NOTES
RAPID RESPONSE NOTE    Paged by RN at 7:10pm for a rapid response as the patient respiratory status has rapidly declined. Patient's temp was 103.5F, pulse 94, RR 32, /63 and is saturating in low 90's on 10L oxymask. Patient's eyes closed with rapid respirations with accessory muscle use and shallow respirations. accu-check, ABG, EKG, lactic acid, portable CXR, blood Cx, EKG, trop, CBC, CMP, influenza, UA, respiratory Cx with gram stain ordered. IV fluids were D/C'ed and a one time dose of lasix IV 20mg was give. Antibiotics were escalated to IV vanc and IV zosyn as patient's portable CXR showed increased b/l pulmonary consolidations. WBC increased to 15.9 and lactic acid was 3.8. Case discussed with Dr. Haley and the decision was made to transfer the patient to the ICU for acute hypoxic respiratory failure.

## 2017-05-26 NOTE — PROGRESS NOTES
Pt transported to pre-op via bed. Transport monitor in use. VSS. 6L supplemental oxygen supplied via oxymask.pt's chart transported with pt. Pt's wife accompanied pt to pre-op. Pt transported by ICU RN and transport tech.

## 2017-05-26 NOTE — PROGRESS NOTES
"Pharmacy Kinetics 56 y.o. female on vancomycin day # 2 2017    Currently on Vancomycin 1800 mg iv LD given at 2237    Indication for Treatment: PNA    Pertinent history per medical record: Admitted on 2017 for MS exacerbation. Pt transferred from [facility] due to abnormal labs. Pt with complicated PMH significant for Abx started a few days ago due to pt being febrile; now worsening respiratory status.    Other antibiotics: Zosyn 4.5 gm iv q6h, doxycycline 100 mg iv12h    Allergies: Tricyclic antidepressants     List concerns for renal function: albumin 3.0 g/dl     2017 19:37   AST(SGOT) 51 (H)   ALT(SGPT) 46   Alkaline Phosphatase 131 (H)     Pertinent cultures to date:   : blood: peripheral: NGTD x 2    Recent Labs      17   14217   0020   WBC  8.6  11.6*  10.1  15.9*  11.8*   NEUTSPOLYS   --   86.10*  87.60*  90.30*  81.70*   BANDSSTABS   --    --   4.40  7.10  12.20*     Recent Labs      17   02417   0020   BUN  14  14  17  17   CREATININE  0.84  0.88  1.06  0.94   ALBUMIN  3.0*   --   3.0*   --      Intake/Output Summary (Last 24 hours) at 17 0444  Last data filed at 17 0200   Gross per 24 hour   Intake   5899 ml   Output   1200 ml   Net   4699 ml      Blood pressure 136/56, pulse 66, temperature 37.2 °C (98.9 °F), resp. rate 18, height 1.575 m (5' 2\"), weight 73.3 kg (161 lb 9.6 oz), SpO2 93 %, not currently breastfeeding. Temp (24hrs), Av.6 °C (101.5 °F), Min:37.2 °C (98.9 °F), Max:39.7 °C (103.5 °F)      A/P   1. Vancomycin dose change: 1100 mg (15 mg/kg x 73.3 kg) iv q12h (, 23)  2. Next vancomycin level: 12 hour VR at 1030  3. Goal trough: 16-20 mcg/ml  4. Comments: Some concern for renal function. Pt afebrile. Loading dose 1800 mg (25 mg/kg x 73.3 kg) iv once given at 2237. Will start scheduled doses. 12 hour VR ordered. Pharmacy will monitor and make " adjustments when appropriate.    America LearyD

## 2017-05-26 NOTE — CONSULTS
DATE OF SERVICE:  05/25/2017    REASON FOR CONSULTATION:  Possible temporal arteritis.    HISTORY OF PRESENT ILLNESS:  The patient is a 56-year-old woman with a history   of multiple sclerosis.  She was transferred from [facility] for a flare of   her multiple sclerosis along with an elevated sed rate.    Symptoms of worsening lower extremity weakness began last week.  About 6 days   ago, she began having left-sided temporal headaches with worsening and   associated with loss of bladder control, prompting a visit to the emergency   room.  Today, she is very uncomfortable and complains of facial spasms.  She   admits that her temporal headaches have improved, but have certainly not   resolved.    PAST MEDICAL HISTORY:  1.  Multiple sclerosis.  2.  Chronic migraines.  3.  Depression.    MEDICATIONS:  A 5- mg b.i.d., acetyl carnitine powder p.r.n. 500 mg   b.i.d., Enablex 15 mg SR, dimethyl fumarate 240 mg, green tea capsules,   magnesium supplements, metoprolol 25 mg, omega-3 fatty acids 1200 mg,   pravastatin 20 mg, (pregabalin) Lyrica 75 mg, prenatal vitamins, probiotics,   sertraline 50 mg a day, tizanidine 4 mg, turmeric 500 mg and vitamin D.    ALLERGIES:  TRICYCLIC ANTIDEPRESSANT.    PAST SURGICAL HISTORY:  None.    SOCIAL HISTORY:  She denies the use of tobacco.  She has no alcohol or drug   abuse.    REVIEW OF SYSTEMS:  GENERAL:  She denies any fever or chills, weight gain or loss.  PULMONARY:  She denies shortness of breath, chronic cough.  CARDIAC:  She denies chest pain or edema.  GASTROINTESTINAL:  She has no nausea, vomiting, diarrhea, or constipation.  MUSCULOSKELETAL:  As discussed above.  NEUROLOGIC:  She has no focal weakness, but as stated, she complains of   chronic migraines and a left temporal headache.    PHYSICAL EXAMINATION:  VITAL SIGNS:  Blood pressure is 94/61, pulse is 81, respirations 24.  HEENT:  Pupils are equal, round and reactive to light.  Extraocular muscles   intact.  NECK:   Supple, without JVD, carotid bruits or thyromegaly.  LUNGS:  Clear to auscultation bilaterally and throughout.  HEART:  Regular rate and rhythm without murmur.  ABDOMEN:  Soft and benign.  MUSCULOSKELETAL:  Her calves are somewhat tender.  Shoulders and neck are also   tender to touch.  She has no left temporal tenderness today, although I _____   feel her temporal pulses.    IMPRESSION:  The patient is a 56-year-old woman who has been managed with   high-dose steroids for presumed diagnosis of temporal arteritis.  Despite   this, she persists with symptoms and with a continued quite high sed rate, it   seems reasonable it improved the diagnosis with the temporal artery biopsy.    We will plan this and I explained my approach to the patient.  It is scheduled   for 03:00 p.m. tomorrow afternoon.  I appreciate the opportunity to care for   your patient.       ____________________________________     MD SARANYA Broussard / PAYAL    DD:  05/25/2017 18:42:08  DT:  05/25/2017 22:02:47    D#:  [id number]  Job#:  [id number]

## 2017-05-26 NOTE — THERAPY
"Speech Language Therapy Clinical Swallow Evaluation completed.    Functional Status: Patient currently on HFNC. She was awake, AAOx4, and c/o pain in head (6 out of 10) -- RN was notified. Oral motor examination was WFL. Patient reported she was on a regular diet prior to NPO status. Presentation of ice chips, 2 oz NTL, and 2oz  puree resulted in effortful swallow and increased WOB and wheezing in 100% of trials. Minimum trials this session 2/2 serious concern for aspiration. RT in room stating patient to be placed on BiPAP. Current CXR showing bilateral pulmonary consolidations. Patient was educated in regards to current status, risk of aspiration, and SLP recs. At this time, patient is at HIGH RISK FOR ASPIRATION with PO d/t compromised respiratory status and current medical condition. Recommend NPO with alternative source of nutrition. Patient verbalized understanding and agreeable to cortrak at this time. SLP is following.     Recommendations - Diet: NPO/cortrak                             Strategies: To be determined                             Medication Administration: Via Gastric Tube     Plan of Care: Will benefit from Speech Therapy 3 times per week    Post-Acute Therapy: Discharge to a transitional care facility for continued skilled therapy services.    See \"Rehab Therapy-Acute\" Patient Summary Report for complete documentation. Thank you for the consult.       "

## 2017-05-26 NOTE — PROGRESS NOTES
[facility] Internal Medicine Interval Note    Name Justa Segovia       1960   Age/Sex 56 y.o. female   MRN [id number]   Code Status FULL     After 5PM or if no immediate response to page, please call for cross-coverage  Attending/Team: Dr Chavarria/MARISOL Call [phone] to page    2nd Call - Day Sr. Resident (R2/R3):   Dr. Warren         Chief complaint/ reason for interval visit   57 yo female with PMHx of multiple sclerosis, DLD, migraine initially admitted for MS flare vs temporal arteritis vs trigeminal neuralgia. Patient was started on steroids and carbamazepine. Surgery was consulted for temporal artery biopsy. Two days after admission, patient spiked fever. She was started on ceftriaxone, doxycycline. Rapid response was called today evening due to worsening respiratory status, sepsis with fever and WBC elevation,  and she was then transferred to ICU .  Started on vanco, zosyn, sepsis protocol initiated; on HFNC; FULL code.      Interval Problem Daily Status Update      Patient reports return of Left sided headache 3/10; orders for pain placed: tylenol, oxycodone prn. She is AAOx4  She is on HFNC 40 L 70%; started on AVAPS  CXR showed bilateral opacities  Blood Cx redrawn; prior cx NGT UTD  Tmax 103.5 °F, BP has been stable w/o pressors  +3.4L  over the last 24 hours, +5L since admit  .   She has Schmitt placed on the floor, placed for ?urine incontinence, possible symptom of MS flare, per neuro note.  No neuro changes  NPO, passed bedside RN swallow screen; pending SLP eval for possible aspiration  On IV Solumedrol, Carbamazepine, Lyrica, Tizanidine, Tecfidera  Neuro following for ? MS flare and ?GCA   Surgery following for temp artery biopsy     Principal Problem:    Unilateral neuralgiform headache POA: Unknown  Active Problems:    Multiple sclerosis (CMS-[facility])      Overview: Pt has hx of MS since the age of 22, started as Seizures managed with       Phenobarbital and  diagnosed 10 yrs later as MS      Hx of worst episode 12 yrs ago requiring very high doses of Steroids (1200       mg ) * 5days      Was in remission for 10 yrs      Symptoms recurred since Thursday  (May 18th 2017)      Leg cramps, heaviness of both legs, followed by severe headache unilateral       left side temporal region      Bladder control lost since yesterday          Pneumonia likely 2/2 aspiration    Hyperlipidemia POA: Unknown    Depression POA: Unknown    History of cardiac arrhythmia POA: Unknown    Hyperglycemia POA: Unknown    Neuropathy of both feet    Temporal arteritis (CMS-HCC) POA: Unknown    Respiratory failure, acute (CMS-[facility]) POA: Unknown    Multiple sclerosis exacerbation (CMS-[facility]) POA: Unknown  Resolved Problems:    * No resolved hospital problems. *      Review of Systems   Constitutional: Negative for fever and chills.   HENT: Negative for congestion, ear pain, sore throat and tinnitus.     +Headache  Eyes: Negative for blurred vision, double vision and pain.   Respiratory: Positive for shortness of breath  Negative for cough, hemoptysis and sputum production.    Cardiovascular: Negative for chest pain, palpitations and leg swelling.   Gastrointestinal: Negative for heartburn, nausea, vomiting, abdominal pain, diarrhea and constipation.   Genitourinary: Negative for dysuria, urgency, frequency and hematuria.       urinary Incontinence   (has been improving, but currently Schmitt is in)  Musculoskeletal: Negative.  Negative for myalgias, back pain and joint pain.   Skin: Negative for itching and rash.   Neurological: Positive for weakness and headaches. Negative for dizziness, tremors, sensory change, speech change, focal weakness, seizures and loss of consciousness.   Psychiatric/Behavioral: Negative.      Consultants  Neurology  ()  Vascular (Dr. Fiath)  PMA/Dr Chavarria    Disposition  ICU: respiratory failure and sepsis      Quality Measures  Core Measures  EKG reviewed, Labs  reviewed, Medications reviewed and Radiology images reviewed  Schmitt catheter: Yes  DVT Prophylaxis: Lovenox  GI prophylaxis: None  Tubes: None  Abx: IV Vanco, Zosyn  Lines: PIV   Schmitt: None      Physical Exam       Filed Vitals:    05/26/17 0300 05/26/17 0400 05/26/17 0500 05/26/17 0600   BP:       Pulse: 62 60 63 72   Temp:  37 °C (98.6 °F)  37.1 °C (98.8 °F)   Resp: 22 20 21 24   Height:       Weight:       SpO2: 94% 94% 96% 98%     Body mass index is 29.55 kg/(m^2).    Oxygen Therapy:  Pulse Oximetry: 98 %, O2 (LPM): 50, O2 Delivery: Nasal Cannula    Physical Exam   Constitutional: She is oriented to person, place, and time and well-developed, well-nourished, and in no distress.Mild resp distress.   HENT: no JVD  Head: Normocephalic and atraumatic.   Eyes: EOM are normal.   Neck: Normal range of motion.   Cardiovascular: Normal rate and regular rhythm.    No murmur heard.  Pulmonary/Chest: Mild respiratory distress. BL rhonchi and rales on inspiration and expiration  Abdominal: Soft. Bowel sounds are normal. She exhibits no distension. There is no tenderness.   Musculoskeletal: Normal range of motion. She exhibits no edema.   Lymphadenopathy:     She has no cervical adenopathy.   Neurological: She is alert and oriented to person, place, and time. She displays normal reflexes. No cranial nerve deficit. GCS score is 15.   5/5 strength throughout, No sensory deificts   Skin: She is not diaphoretic.         Lab Data Review:      5/24/2017  6:18 AM    Recent Labs      05/25/17   0246  05/25/17 1937 05/26/17   0020  05/26/17   0410   SODIUM  133*  130*  133*  133*   POTASSIUM  4.7  4.5  4.2  4.0   CHLORIDE  104  99  104  104   CO2  20  20  18*  19*   BUN  14  17  17  16   CREATININE  0.88  1.06  0.94  0.91   MAGNESIUM  2.1   --   1.9   --    CALCIUM  8.5  9.0  8.0*  8.4*       Recent Labs      05/24/17   0247 05/25/17 1937 05/26/17   0020  05/26/17   0410   KRISTINA  41   --   46   --    --    OLIVER Gamez*   --    51*   --    --    ALKPHOSPHAT  115*   --   131*   --    --    TBILIRUBIN  0.4   --   0.5   --    --    GLUCOSE  201*   < >  192*  181*  192*    < > = values in this interval not displayed.       Recent Labs      17   0020   RBC  3.04*  3.34*  2.95*   HEMOGLOBIN  9.9*  10.7*  9.5*   HEMATOCRIT  29.6*  32.4*  28.8*   PLATELETCT  231  270  241       Recent Labs      17   0020   WBC  8.6   < >  10.1  15.9*  11.8*   NEUTSPOLYS   --    < >  87.60*  90.30*  81.70*   LYMPHOCYTES   --    < >  4.40*  2.60*  4.30*   MONOCYTES   --    < >  2.70  0.00  0.90   EOSINOPHILS   --    < >  0.00  0.00  0.00   BASOPHILS   --    < >  0.90  0.00  0.00   ASTSGOT  49*   --    --   51*   --    ALTSGPT  41   --    --   46   --    ALKPHOSPHAT  115*   --    --   131*   --    TBILIRUBIN  0.4   --    --   0.5   --     < > = values in this interval not displayed.           Assessment/Plan       Sepsis secondary to pulmonary source/PNA  Acute hypoxic respiratory failure  Pneumonia   - SIRS: 3(fever, tachypnea, leukocytosis), lactic acid : 3.8. BP stable  - CXR: worsening bilateral pulmonary infiltrates  - Blood cx 17: NGTD. AB.45//58.5; on HFNC  -  influenza PCR NGT;   sputum C&S pending;  UA: mild protein, WBC 2-5;   -DDx also include vasculitis, diffuse pulmonary hemorrhage, aspiration PNA  - was on ceftriaxone and doxy started  to  , started on sepsis protocol, vancomycin, zosyn, cont doxycycline   Plan:  -cont zosyn, vancomycin IV and doxycycline PO; solumedrol 62.5 q8h  -+5 L since admission after sepsis bolus; will hold off on IV hydration due to concern for fluid overload; will consider lasix  -f/u blood and sputum cx  -possible intubation  -possible bronchoscopy  - supplemental oxygen: currently on 50 L HFNC /70%;  RT protocol  -Echo pending    WBC elevation  -probably secondary to infection, PNA and steroids    -improving  -continue current mgm    Elevated lactic acid  -likely due to sepsis  -improved    Elevated liver enzymes, mild  - patient denies abdominal symptoms  - closely monitor as patient is on carbamazepine    ?MS flare  Multiple sclerosis   - Diagnosed 34 years ago and followed by [facility] (Dr. Asmita Pollard)  - Has had hx of flare requiring high dose IV Steroids x5 days, chronic urinary incontinence  - Neuro (Dr. Cosme) consulted; received IV Solumedrol 500mg BID, Tecfidera  - on Oxybutynin for incontinence  - neurology on board      Unilateral neuralgiform headache  - New onset, localized primarily to LEFT fronto-temporal region, mod-severe, sharp, intermittent  - Presentation on admission was worrisome for temporal arteritis vs other neurogenic etiology  - Initial work-up:  (140 at [facility]), CRP 48    - CT Head: No acute intracranial abnormality  - MRI C/L-spine/brain: Evidence chronic demyelinating plaques of MS  - Neuro (Dr. Cosme) following >> cannot exclude Temporal Arteritis, but also continued current treatment for presumed Trigeminal Neuralgia.    - on Carbamazepine 200mg BID for possible trigeminal neuralgia  - temporal artery biopsy pending  -cont solumedrol 62.5 q8h      Hyperlipidemia  - Cont Pravastatin 40mg QD    Depression  - Cont Sertraline 50mg QD    History of cardiac arrhythmia  -  cont home Metoprolol 25mg BID (with holding parameters if HR < 50)    Hyperglycemia  - Likely exacerbated by IV Steroids  - ISS, Hypoglycemic protocol, POC     Neuropathy of both feet  - Cont Lyrica 75mg BID, Tizanidine 2mg BID

## 2017-05-27 ENCOUNTER — APPOINTMENT (OUTPATIENT)
Dept: RADIOLOGY | Facility: MEDICAL CENTER | Age: 57
DRG: 040 | End: 2017-05-27
Attending: INTERNAL MEDICINE
Payer: MEDICARE

## 2017-05-27 LAB
ANION GAP SERPL CALC-SCNC: 7 MMOL/L (ref 0–11.9)
BASOPHILS # BLD AUTO: 0 % (ref 0–1.8)
BASOPHILS # BLD: 0 K/UL (ref 0–0.12)
BUN SERPL-MCNC: 18 MG/DL (ref 8–22)
CALCIUM SERPL-MCNC: 8.7 MG/DL (ref 8.5–10.5)
CHLORIDE SERPL-SCNC: 105 MMOL/L (ref 96–112)
CO2 SERPL-SCNC: 22 MMOL/L (ref 20–33)
CREAT SERPL-MCNC: 0.85 MG/DL (ref 0.5–1.4)
EOSINOPHIL # BLD AUTO: 0 K/UL (ref 0–0.51)
EOSINOPHIL NFR BLD: 0 % (ref 0–6.9)
ERYTHROCYTE [DISTWIDTH] IN BLOOD BY AUTOMATED COUNT: 49.6 FL (ref 35.9–50)
GFR SERPL CREATININE-BSD FRML MDRD: >60 ML/MIN/1.73 M 2
GLUCOSE BLD-MCNC: 104 MG/DL (ref 65–99)
GLUCOSE BLD-MCNC: 153 MG/DL (ref 65–99)
GLUCOSE BLD-MCNC: 156 MG/DL (ref 65–99)
GLUCOSE SERPL-MCNC: 154 MG/DL (ref 65–99)
HCT VFR BLD AUTO: 29.2 % (ref 37–47)
HGB BLD-MCNC: 9.9 G/DL (ref 12–16)
LYMPHOCYTES # BLD AUTO: 0.27 K/UL (ref 1–4.8)
LYMPHOCYTES NFR BLD: 1.7 % (ref 22–41)
MANUAL DIFF BLD: NORMAL
MCH RBC QN AUTO: 32.4 PG (ref 27–33)
MCHC RBC AUTO-ENTMCNC: 33.9 G/DL (ref 33.6–35)
MCV RBC AUTO: 95.4 FL (ref 81.4–97.8)
MONOCYTES # BLD AUTO: 0 K/UL (ref 0–0.85)
MONOCYTES NFR BLD AUTO: 0 % (ref 0–13.4)
MORPHOLOGY BLD-IMP: NORMAL
NEUTROPHILS # BLD AUTO: 15.83 K/UL (ref 2–7.15)
NEUTROPHILS NFR BLD: 94.8 % (ref 44–72)
NEUTS BAND NFR BLD MANUAL: 3.5 % (ref 0–10)
NRBC # BLD AUTO: 0 K/UL
NRBC BLD AUTO-RTO: 0 /100 WBC
PLATELET # BLD AUTO: 258 K/UL (ref 164–446)
PLATELET BLD QL SMEAR: NORMAL
PMV BLD AUTO: 10.8 FL (ref 9–12.9)
POLYCHROMASIA BLD QL SMEAR: NORMAL
POTASSIUM SERPL-SCNC: 3.9 MMOL/L (ref 3.6–5.5)
RBC # BLD AUTO: 3.06 M/UL (ref 4.2–5.4)
RBC BLD AUTO: PRESENT
SODIUM SERPL-SCNC: 134 MMOL/L (ref 135–145)
TOXIC GRANULES BLD QL SMEAR: SLIGHT
WBC # BLD AUTO: 16.1 K/UL (ref 4.8–10.8)

## 2017-05-27 PROCEDURE — 700111 HCHG RX REV CODE 636 W/ 250 OVERRIDE (IP): Performed by: INTERNAL MEDICINE

## 2017-05-27 PROCEDURE — 700102 HCHG RX REV CODE 250 W/ 637 OVERRIDE(OP): Performed by: PSYCHIATRY & NEUROLOGY

## 2017-05-27 PROCEDURE — A9270 NON-COVERED ITEM OR SERVICE: HCPCS | Performed by: PSYCHIATRY & NEUROLOGY

## 2017-05-27 PROCEDURE — 770022 HCHG ROOM/CARE - ICU (200)

## 2017-05-27 PROCEDURE — A9270 NON-COVERED ITEM OR SERVICE: HCPCS | Performed by: INTERNAL MEDICINE

## 2017-05-27 PROCEDURE — 85007 BL SMEAR W/DIFF WBC COUNT: CPT

## 2017-05-27 PROCEDURE — 99291 CRITICAL CARE FIRST HOUR: CPT | Performed by: INTERNAL MEDICINE

## 2017-05-27 PROCEDURE — 85027 COMPLETE CBC AUTOMATED: CPT

## 2017-05-27 PROCEDURE — 700111 HCHG RX REV CODE 636 W/ 250 OVERRIDE (IP)

## 2017-05-27 PROCEDURE — 80048 BASIC METABOLIC PNL TOTAL CA: CPT

## 2017-05-27 PROCEDURE — 700105 HCHG RX REV CODE 258: Performed by: INTERNAL MEDICINE

## 2017-05-27 PROCEDURE — 94003 VENT MGMT INPAT SUBQ DAY: CPT

## 2017-05-27 PROCEDURE — 700102 HCHG RX REV CODE 250 W/ 637 OVERRIDE(OP): Performed by: INTERNAL MEDICINE

## 2017-05-27 PROCEDURE — 700111 HCHG RX REV CODE 636 W/ 250 OVERRIDE (IP): Performed by: HOSPITALIST

## 2017-05-27 PROCEDURE — A9270 NON-COVERED ITEM OR SERVICE: HCPCS | Performed by: HOSPITALIST

## 2017-05-27 PROCEDURE — 302136 NUTRITION PUMP: Performed by: INTERNAL MEDICINE

## 2017-05-27 PROCEDURE — 82962 GLUCOSE BLOOD TEST: CPT | Mod: 91

## 2017-05-27 PROCEDURE — 71010 DX-CHEST-PORTABLE (1 VIEW): CPT

## 2017-05-27 PROCEDURE — 87040 BLOOD CULTURE FOR BACTERIA: CPT

## 2017-05-27 PROCEDURE — 700102 HCHG RX REV CODE 250 W/ 637 OVERRIDE(OP): Performed by: HOSPITALIST

## 2017-05-27 RX ORDER — FUROSEMIDE 10 MG/ML
INJECTION INTRAMUSCULAR; INTRAVENOUS
Status: COMPLETED
Start: 2017-05-27 | End: 2017-05-27

## 2017-05-27 RX ORDER — FUROSEMIDE 10 MG/ML
20 INJECTION INTRAMUSCULAR; INTRAVENOUS EVERY 8 HOURS
Status: DISCONTINUED | OUTPATIENT
Start: 2017-05-27 | End: 2017-05-30

## 2017-05-27 RX ADMIN — PRAVASTATIN SODIUM 40 MG: 20 TABLET ORAL at 20:48

## 2017-05-27 RX ADMIN — PIPERACILLIN SODIUM AND TAZOBACTAM SODIUM 4.5 G: 4; .5 INJECTION, POWDER, FOR SOLUTION INTRAVENOUS at 09:08

## 2017-05-27 RX ADMIN — DOXYCYCLINE 100 MG: 100 TABLET ORAL at 20:47

## 2017-05-27 RX ADMIN — INSULIN LISPRO 2 UNITS: 100 INJECTION, SOLUTION INTRAVENOUS; SUBCUTANEOUS at 06:06

## 2017-05-27 RX ADMIN — TIZANIDINE 2 MG: 4 TABLET ORAL at 08:51

## 2017-05-27 RX ADMIN — STANDARDIZED SENNA CONCENTRATE AND DOCUSATE SODIUM 2 TABLET: 8.6; 5 TABLET, FILM COATED ORAL at 20:49

## 2017-05-27 RX ADMIN — PIPERACILLIN SODIUM AND TAZOBACTAM SODIUM 4.5 G: 4; .5 INJECTION, POWDER, FOR SOLUTION INTRAVENOUS at 03:58

## 2017-05-27 RX ADMIN — FUROSEMIDE 20 MG: 10 INJECTION, SOLUTION INTRAMUSCULAR; INTRAVENOUS at 08:53

## 2017-05-27 RX ADMIN — DOXYCYCLINE 100 MG: 100 TABLET ORAL at 08:51

## 2017-05-27 RX ADMIN — OXYCODONE HYDROCHLORIDE 5 MG: 5 TABLET ORAL at 17:50

## 2017-05-27 RX ADMIN — OXYCODONE HYDROCHLORIDE 5 MG: 5 TABLET ORAL at 12:08

## 2017-05-27 RX ADMIN — OXYBUTYNIN CHLORIDE 10 MG: 10 TABLET, FILM COATED, EXTENDED RELEASE ORAL at 20:48

## 2017-05-27 RX ADMIN — METOPROLOL TARTRATE 25 MG: 25 TABLET, FILM COATED ORAL at 20:48

## 2017-05-27 RX ADMIN — ENOXAPARIN SODIUM 40 MG: 100 INJECTION SUBCUTANEOUS at 08:52

## 2017-05-27 RX ADMIN — METHYLPREDNISOLONE SODIUM SUCCINATE 62.5 MG: 125 INJECTION, POWDER, FOR SOLUTION INTRAMUSCULAR; INTRAVENOUS at 20:47

## 2017-05-27 RX ADMIN — Medication 500 MG: at 08:52

## 2017-05-27 RX ADMIN — CARBAMAZEPINE 200 MG: 100 SUSPENSION ORAL at 08:51

## 2017-05-27 RX ADMIN — ACETAMINOPHEN 650 MG: 325 TABLET, FILM COATED ORAL at 06:05

## 2017-05-27 RX ADMIN — TIZANIDINE 2 MG: 4 TABLET ORAL at 20:49

## 2017-05-27 RX ADMIN — CARBAMAZEPINE 200 MG: 100 SUSPENSION ORAL at 20:43

## 2017-05-27 RX ADMIN — FUROSEMIDE 20 MG: 10 INJECTION, SOLUTION INTRAMUSCULAR; INTRAVENOUS at 14:23

## 2017-05-27 RX ADMIN — Medication 500 MG: at 17:10

## 2017-05-27 RX ADMIN — FUROSEMIDE 20 MG: 10 INJECTION, SOLUTION INTRAMUSCULAR; INTRAVENOUS at 20:48

## 2017-05-27 RX ADMIN — FUROSEMIDE 20 MG: 10 INJECTION, SOLUTION INTRAMUSCULAR; INTRAVENOUS at 09:00

## 2017-05-27 RX ADMIN — METOPROLOL TARTRATE 25 MG: 25 TABLET, FILM COATED ORAL at 08:51

## 2017-05-27 RX ADMIN — METHYLPREDNISOLONE SODIUM SUCCINATE 62.5 MG: 125 INJECTION, POWDER, FOR SOLUTION INTRAMUSCULAR; INTRAVENOUS at 06:06

## 2017-05-27 RX ADMIN — PREGABALIN 75 MG: 75 CAPSULE ORAL at 08:51

## 2017-05-27 RX ADMIN — SERTRALINE 50 MG: 50 TABLET, FILM COATED ORAL at 20:48

## 2017-05-27 RX ADMIN — PIPERACILLIN SODIUM AND TAZOBACTAM SODIUM 4.5 G: 4; .5 INJECTION, POWDER, FOR SOLUTION INTRAVENOUS at 17:10

## 2017-05-27 RX ADMIN — PREGABALIN 75 MG: 75 CAPSULE ORAL at 20:48

## 2017-05-27 RX ADMIN — STANDARDIZED SENNA CONCENTRATE AND DOCUSATE SODIUM 2 TABLET: 8.6; 5 TABLET, FILM COATED ORAL at 08:51

## 2017-05-27 RX ADMIN — METHYLPREDNISOLONE SODIUM SUCCINATE 62.5 MG: 125 INJECTION, POWDER, FOR SOLUTION INTRAMUSCULAR; INTRAVENOUS at 14:23

## 2017-05-27 RX ADMIN — PIPERACILLIN SODIUM AND TAZOBACTAM SODIUM 4.5 G: 4; .5 INJECTION, POWDER, FOR SOLUTION INTRAVENOUS at 22:21

## 2017-05-27 RX ADMIN — INSULIN LISPRO 2 UNITS: 100 INJECTION, SOLUTION INTRAVENOUS; SUBCUTANEOUS at 12:04

## 2017-05-27 ASSESSMENT — PULMONARY FUNCTION TESTS
EPAP_CMH2O: 8

## 2017-05-27 ASSESSMENT — PAIN SCALES - GENERAL
PAINLEVEL_OUTOF10: 3
PAINLEVEL_OUTOF10: 1
PAINLEVEL_OUTOF10: 0
PAINLEVEL_OUTOF10: 0
PAINLEVEL_OUTOF10: 7
PAINLEVEL_OUTOF10: 0
PAINLEVEL_OUTOF10: 7
PAINLEVEL_OUTOF10: 7
PAINLEVEL_OUTOF10: 4
PAINLEVEL_OUTOF10: 3
PAINLEVEL_OUTOF10: 1
PAINLEVEL_OUTOF10: 0

## 2017-05-27 ASSESSMENT — PATIENT HEALTH QUESTIONNAIRE - PHQ9
SUM OF ALL RESPONSES TO PHQ QUESTIONS 1-9: 0
SUM OF ALL RESPONSES TO PHQ9 QUESTIONS 1 AND 2: 0
2. FEELING DOWN, DEPRESSED, IRRITABLE, OR HOPELESS: NOT AT ALL
1. LITTLE INTEREST OR PLEASURE IN DOING THINGS: NOT AT ALL

## 2017-05-27 NOTE — PROGRESS NOTES
Pt transferred to [facility] via bed at 0630, connected to ICU monitor, placed back on BiPAP by [place] RT. Ice packs placed per pt request.

## 2017-05-27 NOTE — PROGRESS NOTES
[facility] Medicine Interval Note    Name Justa Segovia       1960   Age/Sex 56 y.o. female   MRN [id number]   Code Status FULL     After 5PM or if no immediate response to page, please call for cross-coverage  Attending/Team: Dr Chavarria/MARISOL Call [phone] to page    2nd Call - Day Sr. Resident (R2/R3):   Dr. Warren         Chief complaint/ reason for interval visit   55 yo female with PMHx of multiple sclerosis, DLD, migraine initially admitted for MS flare vs temporal arteritis vs trigeminal neuralgia. Patient was started on steroids and carbamazepine. Surgery was consulted for temporal artery biopsy. Two days after admission, patient spiked fever. She was started on ceftriaxone, doxycycline. Rapid response was called today evening due to worsening respiratory status, sepsis with fever and WBC elevation,  and she was then transferred to ICU .  Started on vanco, zosyn, sepsis protocol initiated; on HFNC; FULL code.      Interval Problem Daily Status Update    Patient is currently on BiPAP with parameters 40%, 8/ 10, 300cc, 14. Denies SOB. 02 req improved: was on oxymask 6 L last night    Fever 101.5 at 6 AM; repeat Blood Cx ordered  Denies any cough  CXR: no significant changes in BL infiltrates; small R sided pleural effusion  Left sided headache 1/10; s/p TA biopsy yesterday.   Cortack was placed yesterday due to high risk for aspiration  No neuro changes  Per Dr Guerrero, pathology result could be NGT due to prior treatment with steroids. He recommended to continue steroids for possible GCA and MS, and cont carbamazepin for possible trigeminal neuralgia. Lung infection, in his opinion, could have started before admission, and provoked MS flare.     Principal Problem:    Unilateral neuralgiform headache POA: Unknown  Active Problems:    Multiple sclerosis (CMS-[facility])      Overview: Pt has hx of MS since the age of 22, started as Seizures managed with        Phenobarbital and diagnosed 10 yrs later as MS      Hx of worst episode 12 yrs ago requiring very high doses of Steroids (1200       mg ) * 5days      Was in remission for 10 yrs      Symptoms recurred since Thursday  (May 18th 2017)      Leg cramps, heaviness of both legs, followed by severe headache unilateral       left side temporal region      Bladder control lost since yesterday          Pneumonia likely 2/2 aspiration    Hyperlipidemia POA: Unknown    Depression POA: Unknown    History of cardiac arrhythmia POA: Unknown    Hyperglycemia POA: Unknown    Neuropathy of both feet    Temporal arteritis (CMS-HCC) POA: Unknown    Respiratory failure, acute (CMS-HCC) POA: Unknown    Multiple sclerosis exacerbation (CMS-[facility]) POA: Unknown  Resolved Problems:    * No resolved hospital problems. *      Review of Systems   Constitutional: Negative for fever and chills.   HENT: Negative for congestion, ear pain, sore throat and tinnitus.     +Headache  Eyes: Negative for blurred vision, double vision and pain.   Respiratory: NGT  Negative for cough, hemoptysis and sputum production.    Cardiovascular: Negative for chest pain, palpitations and leg swelling.   Gastrointestinal: Negative for heartburn, nausea, vomiting, abdominal pain, diarrhea and constipation.   Genitourinary: Negative for dysuria, urgency, frequency and hematuria.       urinary Incontinence   (has been improving, but currently Schmitt is in)  Musculoskeletal: Negative.  Negative for myalgias, back pain and joint pain.   Skin: Negative for itching and rash.   Neurological: Positive for generalized weakness and headaches 1/10. Negative for dizziness, tremors, sensory change, speech change, focal weakness, seizures and loss of consciousness.   Psychiatric/Behavioral: Negative.      Consultants  Neurology  ()  Vascular (Dr. Faith)  [facility]/Dr Chavarria    Disposition  ICU: respiratory failure and sepsis      Quality Measures  Core Measures  EKG reviewed,  Labs reviewed, Medications reviewed and Radiology images reviewed  Schmitt catheter: Yes  DVT Prophylaxis: Lovenox  GI prophylaxis: None  Tubes: None  Abx: IV Vanco, Zosyn  Lines: PIV   Schmitt: None      Physical Exam       Filed Vitals:    05/27/17 0400 05/27/17 0500 05/27/17 0600 05/27/17 0635   BP:       Pulse: 63 62 75    Temp: 38.3 °C (100.9 °F)  38.6 °C (101.5 °F)    Resp: 25 24 36    Height:       Weight:       SpO2: 97% 97% 92% 95%     Body mass index is 33.74 kg/(m^2).    Oxygen Therapy:  Pulse Oximetry: 95 %, O2 (LPM): 6, O2 Delivery: Oxymask    Physical Exam   Constitutional: She is oriented to person, place, and time and well-developed, well-nourished, and in no distress.Mild resp distress.   HENT: no JVD  Head: Normocephalic and atraumatic.   Eyes: EOM are normal.   Neck: Normal range of motion.   Cardiovascular: Normal rate and regular rhythm.    No murmur heard.  Pulmonary/Chest: Mild respiratory distress. BL rhonchi and rales on inspiration and expiration  Abdominal: Soft. Bowel sounds are normal. She exhibits no distension. There is no tenderness.   Musculoskeletal: Normal range of motion. She exhibits BL leg puffiness  Neurological: She is alert and oriented to person, place, and time. She displays normal reflexes. No cranial nerve deficit. GCS score is 15.   5/5 strength throughout, No sensory deificts   Skin: She is not diaphoretic. no rash        Lab Data Review:      5/24/2017  6:18 AM    Recent Labs      05/25/17   0246   05/26/17   0020  05/26/17   0410  05/27/17   0228   SODIUM  133*   < >  133*  133*  134*   POTASSIUM  4.7   < >  4.2  4.0  3.9   CHLORIDE  104   < >  104  104  105   CO2  20   < >  18*  19*  22   BUN  14   < >  17  16  18   CREATININE  0.88   < >  0.94  0.91  0.85   MAGNESIUM  2.1   --   1.9   --    --    CALCIUM  8.5   < >  8.0*  8.4*  8.7    < > = values in this interval not displayed.       Recent Labs      05/25/17   1937  05/26/17   0020  05/26/17   0410  05/27/17   0228    ALTSGPT  46   --    --    --    ASTSGOT  51*   --    --    --    ALKPHOSPHAT  131*   --    --    --    TBILIRUBIN  0.5   --    --    --    GLUCOSE  192*  181*  192*  154*       Recent Labs      17   RBC  3.34*  2.95*  3.06*   HEMOGLOBIN  10.7*  9.5*  9.9*   HEMATOCRIT  32.4*  28.8*  29.2*   PLATELETCT  270  241  258       Recent Labs      17   WBC  15.9*  11.8*  16.1*   NEUTSPOLYS  90.30*  81.70*  94.80*   LYMPHOCYTES  2.60*  4.30*  1.70*   MONOCYTES  0.00  0.90  0.00   EOSINOPHILS  0.00  0.00  0.00   BASOPHILS  0.00  0.00  0.00   ASTSGOT  51*   --    --    ALTSGPT  46   --    --    ALKPHOSPHAT  131*   --    --    TBILIRUBIN  0.5   --    --            Assessment/Plan       Sepsis secondary to pulmonary source/PNA  Acute hypoxic respiratory failure  Pneumonia   - SIRS: 3(fever, tachypnea, leukocytosis), lactic acid : 3.8. BP and HR stable  - CXR: bilateral pulmonary infiltrates  - Blood cx 17: NGTD. AB.45/26/58.5; on HFNC  -  influenza PCR NGT;   sputum C&S pending;  UA: mild protein, WBC 2-5;   -DDx also include vasculitis, diffuse pulmonary hemorrhage, aspiration PNA  - was on ceftriaxone and doxy started  to  , started on sepsis protocol, vancomycin, zosyn, cont doxycycline   Plan:  -cont zosyn, vancomycin IV and doxycycline PO; solumedrol 62.5 q8h  -+5 L since admission after sepsis bolus; will hold off on IV hydration and will start lasix 20 mg IV TID  -f/u blood and sputum cx (repeat Blood cx  due to continuous fever)  -bronchoscopy if developed respiratory distress   - supplemental oxygen: currently on 6 L oxymask, optional BiPAP  -Echo : moderate PAH (RVSP 50mm), EF 60%, possible fluid overload    Fluid overload  -non collapsable IVC of Echo, leg puffines; +5 L since adm  -etiology include steroids vs preexisting PAH and RV disfunction  -lasix 20 mg IV TID, CTM I/O    PAH  -question  preexisitng PAH vs acute secondary to acute lung process  -cont current mgm as above    WBC elevation  -probably secondary to infection, PNA and steroids   -continue current mgm    Elevated lactic acid  -likely due to sepsis  -improved    Elevated liver enzymes, mild  - patient denies abdominal symptoms  - closely monitor as patient is on carbamazepine    ?MS flare  Multiple sclerosis   - Diagnosed 34 years ago and followed by [facility] (Dr. Asmita Pollard)  - Has had hx of flare requiring high dose IV Steroids x5 days, chronic urinary incontinence  - Neuro (Dr. Cosme) consulted; received IV Solumedrol 500mg BID, Tecfidera  - on Oxybutynin for incontinence; Schmitt in for critical condition  - neurology on board      Unilateral neuralgiform headache  - New onset, localized primarily to LEFT fronto-temporal region, mod-severe, sharp, intermittent  - Presentation on admission was worrisome for temporal arteritis vs other neurogenic etiology  - Initial work-up:  (140 at [facility]), CRP 48    - CT Head: No acute intracranial abnormality  - MRI C/L-spine/brain: Evidence chronic demyelinating plaques of MS  - Neuro (Dr. Cosme) following >> cannot exclude Temporal Arteritis, but also continued current treatment for presumed Trigeminal Neuralgia.    - on Carbamazepine 200mg BID for possible trigeminal neuralgia  - temporal artery biopsy pending; could be falsely NGT due to pretreatment with steroids  -cont solumedrol 62.5 q8h      Hyperlipidemia  - Cont Pravastatin 40mg QD    Depression  - Cont Sertraline 50mg QD    History of cardiac arrhythmia  -  cont home Metoprolol 25mg BID (with holding parameters if HR < 50)    Hyperglycemia  - Likely exacerbated by IV Steroids  - ISS, Hypoglycemic protocol, POC     Neuropathy of both feet  - Cont Lyrica 75mg BID, Tizanidine 2mg BID

## 2017-05-27 NOTE — PROGRESS NOTES
Pt transported to pre-op via bed with transport monitor in use. VSS. 6L supplemental oxygen supplied via oxymask. Pt's chart transported with pt. Pt's wife accompanied pt to pre-op. Pt transported by ICU RN and transport tech.

## 2017-05-27 NOTE — PROGRESS NOTES
Pulmonary Critical Care Progress Note      Date of service: 5/27/2017    Chief Complaint: Multiple sclerosis flare up.     History of Present Illness: I was kindly asked by Dr. Ivan to see and evaluate this lady regarding the above problems.  This is a 56-year-old lady who was admitted to [facility] on or about 05/22/2017.  She presented with an elevated Westergren sed rate, headache, nausea and vomiting.  She was felt to possibly have temporal arteritis.  She has a  history of multiple sclerosis.  She was started on high dose methylprednisolone.  She was seen by neurology.  The plan is for her to have a temporal artery biopsy tomorrow.  Today, I was called because of tachypnea, fever and increasing oxygen requirements.  She has now been transferred to the intensive care unit.    ROS:  Respiratory: negative shortness of breath, Cardiac: negative chest pain, GI: negative abdominal pain.  All other systems negative.    Interval Events:  24 hour interval history reviewed   Tmax 102.1 °F.   +2L over the last 24 hours, +7L since admit   CXR shows: Dense right lower lobe consolidation with air bronchogram. Ongoing multilobar infiltrate predominant lower lung fields bilaterally.   Alert and oriented x4, neurologically intact.   ST, -140 systolic.   Improved headache this AM.   Received Tylenol for elevated temperature.   Large BM yesterday with improved abdominal pain.   On AVAPS.   Bcx pending.     PFSH:  No change.    Physical Exam  General: Follows commands, far more comfortable today.  Neuro/Psych: Alert and oriented.   HEENT: Normocephalic.   CVS: Sinus rhythm, regular rate.   Respiratory: Diminished.  Abdomen/: Soft, non-tender.  Extremities: 2-3+ diffuse edema.     Respiratory:     Pulse Oximetry: 95 %  ImagingAvailable data reviewed   Recent Labs      05/25/17   1932   [id number]  7.45   [id number]  26.0   [id number]  58.5*   [id number]  89.4*   ARTHCO3  18   ARTBE  -5*      HemoDynamics:  Pulse: 75, Heart Rate (Monitored): 74  Blood Pressure: 105/46 mmHg, NIBP: 115/55 mmHg     Imaging: Available data reviewed     Recent Labs      05/25/17   1937   TROPONINI  0.02     Neuro:  GCS Total [place] Coma Score: 15  Imaging: Available data reviewed    Fluids:  Intake/Output       05/25/17 0700 - 05/26/17 0659 05/26/17 0700 - 05/27/17 0659 05/27/17 0700 - 05/28/17 0659      [id number] 5925-6406 Total 0700-1859 3879-2685 Total 9759-8016 6380-2673 Total       Intake    I.V.  1200  3949 514  2075  950 3025  --  -- --    Crystalloid Intake -- -- -- 600 -- 600 -- -- --    IV Volume (< Enter Name Here >) 1200 -- 1200 -- -- -- -- -- --    IV Volume (NS) -- 1100 1100  -- -- --    IV Volume (Sepsis bolus) -- 2199 2199 -- 350 350 -- -- --    IV Piggyback Volume (IV Piggyback) -- 650 650 750 -- 750 -- -- --    Other  --  -- --  --  120 120  --  -- --    Medications (P.O./ Enteral Liquids) -- -- -- -- 120 120 -- -- --    Enteral  --  -- --  --  90 90  --  -- --    Free Water / Tube Flush -- -- -- -- 90 90 -- -- --    Total Intake 1200 3941 0917 2075 1160 3235 -- -- --       Output    Urine  --  1725 1725  625  460 1085  --  -- --    Indwelling Cathether -- 1725 1725  -- -- --    Blood  --  -- --  20  -- 20  --  -- --    Est. Blood Loss (mL) -- -- -- 20 -- 20 -- -- --    Total Output -- 1725 1725  -- -- --       Net I/O     1200 2224 3424 5069 882 0894 -- -- --           Recent Labs      05/25/17   0246   05/26/17   0020  05/26/17   0410  05/27/17   0228   SODIUM  133*   < >  133*  133*  134*   POTASSIUM  4.7   < >  4.2  4.0  3.9   CHLORIDE  104   < >  104  104  105   CO2  20   < >  18*  19*  22   BUN  14   < >  17  16  18   CREATININE  0.88   < >  0.94  0.91  0.85   MAGNESIUM  2.1   --   1.9   --    --    CALCIUM  8.5   < >  8.0*  8.4*  8.7    < > = values in this interval not displayed.     GI/Nutrition:  Imaging: Available data reviewed  taking PO     Liver  Function:  Recent Labs      17   0020  17   ALTSGPT  46   --    --    --    ASTSGOT  51*   --    --    --    ALKPHOSPHAT  131*   --    --    --    TBILIRUBIN  0.5   --    --    --    GLUCOSE  192*  181*  192*  154*     Heme:  Recent Labs      17   RBC  3.34*  2.95*  3.06*   HEMOGLOBIN  10.7*  9.5*  9.9*   HEMATOCRIT  32.4*  28.8*  29.2*   PLATELETCT  270  241  258     Infectious Disease:  Temp  Av.6 °C (99.6 °F)  Min: 36.3 °C (97.4 °F)  Max: 38.9 °C (102.1 °F)  Micro: reviewed  Recent Labs      17   WBC  15.9*  11.8*  16.1*   NEUTSPOLYS  90.30*  81.70*  94.80*   LYMPHOCYTES  2.60*  4.30*  1.70*   MONOCYTES  0.00  0.90  0.00   EOSINOPHILS  0.00  0.00  0.00   BASOPHILS  0.00  0.00  0.00   ASTSGOT  51*   --    --    ALTSGPT  46   --    --    ALKPHOSPHAT  131*   --    --    TBILIRUBIN  0.5   --    --      Current Facility-Administered Medications   Medication Dose Frequency Provider Last Rate Last Dose   • methylPREDNISolone sod succ (SOLU-MEDROL) 125 MG injection 62.5 mg  62.5 mg Q8HRS Jinny MORALES M.D.   62.5 mg at 17 0606   • calcium carbonate (OS-JAYESH 500) tablet 500 mg  500 mg BID WITH MEALS Jinny MORALES M.D.   500 mg at 17 1844   • vancomycin 1,100 mg in  mL IVPB  15 mg/kg Q12HR Tanya Gustafson PHARMD   Stopped at 17 0054   • oxycodone immediate-release (ROXICODONE) tablet 5 mg  5 mg Q6HRS PRN Vikash Warren M.D.   5 mg at 17 1843   • carbamazepine (TEGRETOL) 100 MG/5ML suspension 200 mg  200 mg BID Navin Cosme M.D.   200 mg at 17   • doxycycline monohydrate (ADOXA) tablet 100 mg  100 mg Q12HRS Jonny Laws M.D.   100 mg at 17   • acetaminophen (TYLENOL) tablet 650 mg  650 mg Q4HRS PRN Vikash Warren M.D.   650 mg at 17 0605   • albuterol (PROVENTIL) 2.5mg/0.5ml nebulizer solution 2.5  mg  2.5 mg Q4H PRN (RT) Navin Cosme M.D.   2.5 mg at 05/25/17 1358   • albuterol inhaler 2 Puff  2 Puff Q2HRS PRN Navin Cosme M.D.   2 Puff at 05/25/17 1946   • MD ALERT... vancomycin per pharmacy protocol   pharmacy to dose Ree Frazier M.D.       • acetaminophen (TYLENOL) suppository 650 mg  650 mg Q6HRS PRN Tommy García M.D.   Stopped at 05/26/17 1135   • NS infusion   Continuous Vitaliy Chavarria M.D. 50 mL/hr at 05/26/17 0851     • NS (BOLUS) infusion 1,000 mL  1,000 mL Once PRN Jonny Laws M.D.       • piperacillin-tazobactam (ZOSYN) 4.5 g in  mL IVPB  4.5 g Q6HRS Jonny Laws M.D.   Stopped at 05/27/17 0458   • insulin lispro (HUMALOG) injection 2-9 Units  2-9 Units 4X/DAY SILVANO Em M.D.   2 Units at 05/27/17 0606   • senna-docusate (PERICOLACE or SENOKOT S) 8.6-50 MG per tablet 2 Tab  2 Tab BID Ree Frazier M.D.   2 Tab at 05/26/17 2124    And   • polyethylene glycol/lytes (MIRALAX) PACKET 1 Packet  1 Packet QDAY PRN Ree Frazier M.D.   Stopped at 05/26/17 0903    And   • magnesium hydroxide (MILK OF MAGNESIA) suspension 30 mL  30 mL QDAY PRN Ree Frazier M.D.   30 mL at 05/26/17 0910    And   • bisacodyl (DULCOLAX) suppository 10 mg  10 mg QDAY PRN Ree Frazier M.D.   10 mg at 05/26/17 2141   • Respiratory Care per Protocol   Continuous RT Ree Frazier M.D.       • enoxaparin (LOVENOX) inj 40 mg  40 mg DAILY Ree Frazier M.D.   40 mg at 05/26/17 0904   • pregabalin (LYRICA) capsule 75 mg  75 mg BID Ree Frazier M.D.   75 mg at 05/26/17 2123   • sertraline (ZOLOFT) tablet 50 mg  50 mg QHS Ree Frazier M.D.   50 mg at 05/26/17 2124   • tizanidine (ZANAFLEX) tablet 2 mg  2 mg BID Ree Frazier M.D.   2 mg at 05/26/17 2123   • oxybutynin SR (DITROPAN-XL) tablet 10 mg  10 mg Q EVENING Ree Frazier M.D.   Stopped at 05/26/17 2100   • ondansetron (ZOFRAN) syringe/vial injection 4 mg  4 mg Q4HRS PRN Ree Frazier M.D.   4 mg at 05/23/17 0524   •  ondansetron (ZOFRAN ODT) dispertab 4 mg  4 mg Q4HRS PRN Ree Frazier M.D.       • promethazine (PHENERGAN) tablet 12.5-25 mg  12.5-25 mg Q4HRS PRN Ree Frazier M.D.       • promethazine (PHENERGAN) suppository 12.5-25 mg  12.5-25 mg Q4HRS PRN Ree Frazier M.D.       • prochlorperazine (COMPAZINE) injection 5-10 mg  5-10 mg Q4HRS PRN Ree Frazier M.D.   10 mg at 05/23/17 0557   • pravastatin (PRAVACHOL) tablet 40 mg  40 mg Nightly Ree Frazier M.D.   40 mg at 05/26/17 2122   • metoprolol (LOPRESSOR) tablet 25 mg  25 mg TWICE DAILY Ree Frazier M.D.   25 mg at 05/26/17 2124     Last reviewed on 5/26/2017  5:15 PM by Marisa Rucker R.N.    Quality  Measures:  Labs reviewed, Medications reviewed and Radiology images reviewed                       Lines:  PIV    Gtts:  NS 50    Abx:  Doxycycline   Vancomycin  Zosyn    bcx 5/24 ngtd  MRSA negative  Flu (-) 5/25    Echo 5/26 - EF 60%, RVSP 48-50    62.5 q8 mg Solumedrol   Lasix 20 Q8    Assessment and Plan:  -  Acute hypoxemic respiratory failure.   - Aspiration/Pna +/- fluid (+ 7L since admit)   - HFNC -> bipap ->6L, worsened right sided consolidation with air bronchogram, however clinically feeling better    - unusual to have pulmonary involvement w GCA/TA, h/h stable over admit (mild dilution), no renal involvement   - rt/02 protocols   - high risk for needing intubation, may benefit from elective intubatin + bronch with bal   - trial of gentle diureses    - IS/PEP/IPV  -  Pneumonia.   - empiric broad spectrum abx   - mrsa swab (-), vanco stopped   - f/u cultures  -  Sepsis, pulmonary source.  -  Query temporal arteritis.     - s/p 3 days of solumedrol 500 q12 on 5/25   - s/p bx 5/26    - esr 140->115   - this am, headache much better   - continue solumedrol 62.5 q8  -  Leukocytosis   - up/down, minimal bands today   - not requiring pressors   - cultures pending   - on empiric abx coverage  -  Anemia.  -  Hyponatremia.  -  History of multiple sclerosis.  -   History of seizures.    Diuresis today, stop all maintenance.   Lasix q8.     Discussed patient condition and risk of morbidity and/or mortality with RN, RT, Pharmacy, Charge nurse / hot rounds, Patient and [facility].    The patient remains critically ill.  Critical care time = 45 minutes in directly providing and coordinating critical care and extensive data review.  No time overlap and excludes procedures.    Viktoria MONTOYA (Matthewibbronson), am scribing for, and in the presence of, Vitaliy Chavarria M.D.     Electronically signed by: Viktoria Barajas (Chris), 5/27/2017    IVitaliy M.D.  personally performed the services described in this documentation, as scribed by Viktoria Barajas in my presence, and it is both accurate and complete.

## 2017-05-27 NOTE — PROGRESS NOTES
Pt returned to room S-121 from PACU. Pt transported via bed with transport monitor in use. VSS upon arrival. Chart returned with pt. Pt's spouse at bedside.

## 2017-05-27 NOTE — OR NURSING
Pt calm and sleeping at this time. Intact dermabond to left temportal area, clean and dry. Pt denies pain. Pt denies numbness or tingling. No nausea or vomiting. SCDs in place. Intact cortrak to left nare. intact indwelling catheter, drained 150cc yellow urine. Report given to TIFFANIE Mart.    Pt via bed, accompanied by ACLS RN, was transferred to [facility] at 1829.

## 2017-05-27 NOTE — DIETARY
"  Nutrition Support Assessment - Female    Justa Segovia is a 56 y.o. female with admitting DX of Multiple sclerosis exacerbation, Temporal arteritis   Respiratory failure, acute (CMS-HCC), pneumonia, Sepsis, Hyponatremia  Pertinent History: migraines, multiple sclerosis    Height: 157.5 cm (5' 2\")  Weight: 83.7 kg (184 lb 8.4 oz)  Weight to Use in Calculations: 73 kg (160 lb 15 oz)  Ideal Body Weight: 49.896 kg (110 lb)  Percent Ideal Body Weight: 146.9  Body mass index is 33.74 kg/(m^2).    Pertinent Labs: Na 134, Glucose 154    Last BM: 17  Pertinent Medications: Calcium Carbonate, Lasix, Humalog, Solumedrol, Zosyn, bowel protocol    Estimated Needs: (QFR=6701)  Total Calories / day: 1241 - 1415 (Calories / k-19)  Total Grams Protein / day:  (Grams Protein / k.5 - 2 gm/kg of IBW)  Total Fluids ml / day: 1462.6 ml        Assessment / Evaluation: IVF stopped today and Lasix ordered.  Using initial bed scale weight and will follow trends.    Plan / Recommendation: Start Impact Peptide @ 25 ml/hr.  Advance per protocol to goal rate of 35 ml/hr.  This will provide 1260 kcal/d, 647 ml/d free water and 79 gm protein per day.  Fluids per MD.  RD following.          "

## 2017-05-27 NOTE — PROGRESS NOTES
Pt returned to room S-121 from pre-op due to delay in surgery. Pt transported via bed with transport monitor in use. VSS. 6 L supplemental oxygen supplied via oxymask. Pt's chart transported with pt. Pt's wife accompanied pt back to room. Pt transported by ICU RN and transport tech.

## 2017-05-27 NOTE — CARE PLAN
Problem: Fluid Volume:  Goal: Will maintain balanced intake and output  Intervention: Monitor, educate, and encourage compliance with therapeutic intake of liquids  DC maintenance fluids, start IV lasix Q8 20 mg

## 2017-05-27 NOTE — PROGRESS NOTES
Neurology Progress Note               Author: VENUS Guerrero Date & Time created: 5/27/2017  8:52 AM     Interval History:  L frontotemporal pain is improved, 4/10.  Still requires oxygen, CXR still abnormal.  Temporal artery biopsy was done, results pending.    Review of Systems:  ROS    Physical Exam:  Physical Exam   Neurological:   Alert, conversant.  Has L ptosis, old, PERRL.  Motor appears =, DTR =.       Labs:  Recent Labs      05/25/17 1932   MXCRX46L  7.45   [id number]  26.0   [id number]  58.5*   [id number]  89.4*   ARTHCO3  18   ARTBE  -5*     Recent Labs      05/25/17 1937   TROPONINI  0.02     Recent Labs      05/25/17 0246   05/26/17 0020 05/26/17 0410 05/27/17 0228   SODIUM  133*   < >  133*  133*  134*   POTASSIUM  4.7   < >  4.2  4.0  3.9   CHLORIDE  104   < >  104  104  105   CO2  20   < >  18*  19*  22   BUN  14   < >  17  16  18   CREATININE  0.88   < >  0.94  0.91  0.85   MAGNESIUM  2.1   --   1.9   --    --    CALCIUM  8.5   < >  8.0*  8.4*  8.7    < > = values in this interval not displayed.     Recent Labs      05/25/17 1937 05/26/17 0020 05/26/17 0410 05/27/17 0228   ALTSGPT  46   --    --    --    ASTSGOT  51*   --    --    --    ALKPHOSPHAT  131*   --    --    --    TBILIRUBIN  0.5   --    --    --    GLUCOSE  192*  181*  192*  154*     Recent Labs      05/25/17 1937 05/26/17 0020  05/27/17 0228   RBC  3.34*  2.95*  3.06*   HEMOGLOBIN  10.7*  9.5*  9.9*   HEMATOCRIT  32.4*  28.8*  29.2*   PLATELETCT  270  241  258     Recent Labs      05/25/17 1937 05/26/17 0020  05/27/17 0228   WBC  15.9*  11.8*  16.1*   NEUTSPOLYS  90.30*  81.70*  94.80*   LYMPHOCYTES  2.60*  4.30*  1.70*   MONOCYTES  0.00  0.90  0.00   EOSINOPHILS  0.00  0.00  0.00   BASOPHILS  0.00  0.00  0.00   ASTSGOT  51*   --    --    ALTSGPT  46   --    --    ALKPHOSPHAT  131*   --    --    TBILIRUBIN  0.5   --    --      Recent Labs      05/26/17   0020  05/26/17   0410  05/27/17   0228   SODIUM   133*  133*  134*   POTASSIUM  4.2  4.0  3.9   CHLORIDE  104  104  105   CO2  18*  19*  22   GLUCOSE  181*  192*  154*   BUN  17  16  18   CREATININE  0.94  0.91  0.85   CALCIUM  8.0*  8.4*  8.7     Hemodynamics:  Temp (24hrs), Av.6 °C (99.7 °F), Min:36.3 °C (97.4 °F), Max:38.9 °C (102.1 °F)  Temperature: (!) 38.6 °C (101.5 °F)  Pulse  Av.9  Min: 53  Max: 94Heart Rate (Monitored): 74  Blood Pressure: 105/46 mmHg, NIBP: 115/55 mmHg     Respiratory:    Respiration: (!) 36, Pulse Oximetry: 95 %     Work Of Breathing / Effort: Mild  RUL Breath Sounds: Expiratory Wheezes;Fine Crackles, RML Breath Sounds: Diminished, RLL Breath Sounds: Diminished, JOCELYNE Breath Sounds: Expiratory Wheezes;Fine Crackles, LLL Breath Sounds: Diminished  Fluids:    Intake/Output Summary (Last 24 hours) at 17 0852  Last data filed at 17 0600   Gross per 24 hour   Intake   2735 ml   Output    955 ml   Net   1780 ml        GI/Nutrition:  Orders Placed This Encounter   Procedures   • DIET ORDER     Standing Status: Standing      Number of Occurrences: 1      Standing Expiration Date:      Order Specific Question:  Diet:     Answer:  Regular [1]      Comments:  resume prior diet     Medical Decision Making, by Problem:  Active Hospital Problems    Diagnosis   • *Unilateral neuralgiform headache [R51]   • Pneumonia likely 2/2 aspiration [J18.9]   • Multiple sclerosis (CMS-HCC) [G35]   • Neuropathy of both feet [G57.93]   • Hyperlipidemia [E78.5]   • Depression [F32.9]   • History of cardiac arrhythmia [Z86.79]   • Hyperglycemia [R73.9]   • Temporal arteritis (CMS-HCC) [M31.6]   • Respiratory failure, acute (CMS-HCC) [J96.00]   • Multiple sclerosis exacerbation (CMS-HCC) [G35]       Plan:  Continue solumedrol pending temporal artery biopsy.  As she got steroids before the biopsy may not show inflammatory changes.    Core Measures

## 2017-05-27 NOTE — OP REPORT
DATE OF SERVICE:  05/26/2017    PREOPERATIVE DIAGNOSIS:  Temporal arteritis.    POSTPROCEDURE DIAGNOSIS:  Temporal arteritis.    PROCEDURE:  Temporal artery biopsy.    SURGEON:  Winnie Faith MD    ANESTHESIA:  Jimmy Acevedo MD    INDICATIONS:  The patient is a 56-year-old woman who presents with temporal   headaches.  She has a tremendous elevation in the sed rate and was treated   with high-dose steroids.  Despite this, she continues to be symptomatic.  For   diagnostic purposes, I was asked to perform a temporal artery biopsy.  She   understands how this is done and agrees to proceed.    DESCRIPTION OF PROCEDURE:  Patient was taken to the operating room and placed   in supine position.  She was made comfortable and adequate oxygen assured.    Sedation was given by Dr. Acevedo.  The left temporal area was prepped with   Betadine gel, cloth towels and paper drapes were placed.  Local anesthesia was   instilled over the left ear, anteriorly.  Using a 15 blade, an incision was   made over the soft tissue where the pulse was palpable and Doppler signal   audible.  Sharp dissection was taken down to the subcutaneous tissue and   overlying veins were ligated with Hemoclips.  I dissected down to the anterior   aspect of the auricle and was able to identify a tortuous small and normal   appearing artery.  It was somewhat more tortuous than I am used to seeing, but   it was the only thing visible within the wound.  It measured about a   millimeter in diameter and seemed appropriate for the temporal artery.  This   was ligated and sent for specimen.  The wound was irrigated and closed with   interrupted 3-0 Vicryl followed by 4-0 Monocryl in a subcuticular fashion.    Dermabond was placed on the anterior auricle and left to dry.  No other   dressing was placed.       ____________________________________     Winnie Faith MD    JLA / NTS    DD:  05/26/2017 17:58:05  DT:  05/26/2017 20:38:19    D#:  [id number]  Job#:   [id number]

## 2017-05-27 NOTE — PROGRESS NOTES
Vascular    PO 1 left temporal artery biopsy.  Incision without bleeding, swelling or pain. Looks excellent.  Path pending. Discussed with patient and her partner.  We will see as needed.

## 2017-05-28 ENCOUNTER — APPOINTMENT (OUTPATIENT)
Dept: RADIOLOGY | Facility: MEDICAL CENTER | Age: 57
DRG: 040 | End: 2017-05-28
Attending: INTERNAL MEDICINE
Payer: MEDICARE

## 2017-05-28 LAB
ANION GAP SERPL CALC-SCNC: 12 MMOL/L (ref 0–11.9)
BASOPHILS # BLD AUTO: 0 % (ref 0–1.8)
BASOPHILS # BLD: 0 K/UL (ref 0–0.12)
BUN SERPL-MCNC: 31 MG/DL (ref 8–22)
CALCIUM SERPL-MCNC: 8.5 MG/DL (ref 8.5–10.5)
CHLORIDE SERPL-SCNC: 101 MMOL/L (ref 96–112)
CO2 SERPL-SCNC: 25 MMOL/L (ref 20–33)
CREAT SERPL-MCNC: 0.97 MG/DL (ref 0.5–1.4)
EOSINOPHIL # BLD AUTO: 0 K/UL (ref 0–0.51)
EOSINOPHIL NFR BLD: 0 % (ref 0–6.9)
ERYTHROCYTE [DISTWIDTH] IN BLOOD BY AUTOMATED COUNT: 49.1 FL (ref 35.9–50)
GFR SERPL CREATININE-BSD FRML MDRD: 60 ML/MIN/1.73 M 2
GLUCOSE BLD-MCNC: 131 MG/DL (ref 65–99)
GLUCOSE BLD-MCNC: 146 MG/DL (ref 65–99)
GLUCOSE BLD-MCNC: 169 MG/DL (ref 65–99)
GLUCOSE BLD-MCNC: 208 MG/DL (ref 65–99)
GLUCOSE BLD-MCNC: 214 MG/DL (ref 65–99)
GLUCOSE SERPL-MCNC: 176 MG/DL (ref 65–99)
HCT VFR BLD AUTO: 34.3 % (ref 37–47)
HGB BLD-MCNC: 11.5 G/DL (ref 12–16)
LYMPHOCYTES # BLD AUTO: 1.33 K/UL (ref 1–4.8)
LYMPHOCYTES NFR BLD: 6.7 % (ref 22–41)
MAGNESIUM SERPL-MCNC: 2.2 MG/DL (ref 1.5–2.5)
MANUAL DIFF BLD: NORMAL
MCH RBC QN AUTO: 31.9 PG (ref 27–33)
MCHC RBC AUTO-ENTMCNC: 33.5 G/DL (ref 33.6–35)
MCV RBC AUTO: 95 FL (ref 81.4–97.8)
MONOCYTES # BLD AUTO: 0.34 K/UL (ref 0–0.85)
MONOCYTES NFR BLD AUTO: 1.7 % (ref 0–13.4)
MORPHOLOGY BLD-IMP: NORMAL
NEUTROPHILS # BLD AUTO: 18.14 K/UL (ref 2–7.15)
NEUTROPHILS NFR BLD: 90.8 % (ref 44–72)
NEUTS BAND NFR BLD MANUAL: 0.8 % (ref 0–10)
NRBC # BLD AUTO: 0.02 K/UL
NRBC BLD AUTO-RTO: 0.1 /100 WBC
PLATELET # BLD AUTO: 298 K/UL (ref 164–446)
PLATELET BLD QL SMEAR: NORMAL
PMV BLD AUTO: 10.9 FL (ref 9–12.9)
POTASSIUM SERPL-SCNC: 3.6 MMOL/L (ref 3.6–5.5)
RBC # BLD AUTO: 3.61 M/UL (ref 4.2–5.4)
RBC BLD AUTO: PRESENT
ROULEAUX BLD QL SMEAR: SLIGHT
SODIUM SERPL-SCNC: 138 MMOL/L (ref 135–145)
TOXIC GRANULES BLD QL SMEAR: SLIGHT
WBC # BLD AUTO: 19.8 K/UL (ref 4.8–10.8)

## 2017-05-28 PROCEDURE — 84484 ASSAY OF TROPONIN QUANT: CPT

## 2017-05-28 PROCEDURE — 700102 HCHG RX REV CODE 250 W/ 637 OVERRIDE(OP): Performed by: INTERNAL MEDICINE

## 2017-05-28 PROCEDURE — 700111 HCHG RX REV CODE 636 W/ 250 OVERRIDE (IP): Performed by: HOSPITALIST

## 2017-05-28 PROCEDURE — 770022 HCHG ROOM/CARE - ICU (200)

## 2017-05-28 PROCEDURE — 71010 DX-CHEST-LIMITED (1 VIEW): CPT

## 2017-05-28 PROCEDURE — 82962 GLUCOSE BLOOD TEST: CPT | Mod: 91

## 2017-05-28 PROCEDURE — 94003 VENT MGMT INPAT SUBQ DAY: CPT

## 2017-05-28 PROCEDURE — A9270 NON-COVERED ITEM OR SERVICE: HCPCS | Performed by: INTERNAL MEDICINE

## 2017-05-28 PROCEDURE — 700111 HCHG RX REV CODE 636 W/ 250 OVERRIDE (IP): Performed by: INTERNAL MEDICINE

## 2017-05-28 PROCEDURE — 85027 COMPLETE CBC AUTOMATED: CPT

## 2017-05-28 PROCEDURE — 99291 CRITICAL CARE FIRST HOUR: CPT | Performed by: INTERNAL MEDICINE

## 2017-05-28 PROCEDURE — 80048 BASIC METABOLIC PNL TOTAL CA: CPT

## 2017-05-28 PROCEDURE — 83735 ASSAY OF MAGNESIUM: CPT

## 2017-05-28 PROCEDURE — 93005 ELECTROCARDIOGRAM TRACING: CPT | Performed by: INTERNAL MEDICINE

## 2017-05-28 PROCEDURE — A9270 NON-COVERED ITEM OR SERVICE: HCPCS | Performed by: PSYCHIATRY & NEUROLOGY

## 2017-05-28 PROCEDURE — 93010 ELECTROCARDIOGRAM REPORT: CPT | Performed by: INTERNAL MEDICINE

## 2017-05-28 PROCEDURE — 700105 HCHG RX REV CODE 258: Performed by: INTERNAL MEDICINE

## 2017-05-28 PROCEDURE — 71010 DX-CHEST-PORTABLE (1 VIEW): CPT

## 2017-05-28 PROCEDURE — 85007 BL SMEAR W/DIFF WBC COUNT: CPT

## 2017-05-28 PROCEDURE — 700102 HCHG RX REV CODE 250 W/ 637 OVERRIDE(OP): Performed by: HOSPITALIST

## 2017-05-28 PROCEDURE — 700102 HCHG RX REV CODE 250 W/ 637 OVERRIDE(OP): Performed by: PSYCHIATRY & NEUROLOGY

## 2017-05-28 PROCEDURE — A9270 NON-COVERED ITEM OR SERVICE: HCPCS | Performed by: HOSPITALIST

## 2017-05-28 RX ORDER — KETOROLAC TROMETHAMINE 30 MG/ML
30 INJECTION, SOLUTION INTRAMUSCULAR; INTRAVENOUS EVERY 6 HOURS PRN
Status: DISCONTINUED | OUTPATIENT
Start: 2017-05-28 | End: 2017-06-02 | Stop reason: HOSPADM

## 2017-05-28 RX ORDER — POTASSIUM CHLORIDE 20 MEQ/1
40 TABLET, EXTENDED RELEASE ORAL DAILY
Status: DISCONTINUED | OUTPATIENT
Start: 2017-05-28 | End: 2017-05-28

## 2017-05-28 RX ORDER — POTASSIUM CHLORIDE 1.5 G/1.58G
40 POWDER, FOR SOLUTION ORAL DAILY
Status: DISCONTINUED | OUTPATIENT
Start: 2017-05-28 | End: 2017-05-31

## 2017-05-28 RX ADMIN — OXYCODONE HYDROCHLORIDE 5 MG: 5 TABLET ORAL at 06:18

## 2017-05-28 RX ADMIN — STANDARDIZED SENNA CONCENTRATE AND DOCUSATE SODIUM 2 TABLET: 8.6; 5 TABLET, FILM COATED ORAL at 20:31

## 2017-05-28 RX ADMIN — ONDANSETRON 4 MG: 2 INJECTION INTRAMUSCULAR; INTRAVENOUS at 14:36

## 2017-05-28 RX ADMIN — PREGABALIN 75 MG: 75 CAPSULE ORAL at 20:31

## 2017-05-28 RX ADMIN — POTASSIUM CHLORIDE 40 MEQ: 1.5 POWDER, FOR SOLUTION ORAL at 08:55

## 2017-05-28 RX ADMIN — INSULIN LISPRO 2 UNITS: 100 INJECTION, SOLUTION INTRAVENOUS; SUBCUTANEOUS at 06:13

## 2017-05-28 RX ADMIN — DOXYCYCLINE 100 MG: 100 TABLET ORAL at 08:54

## 2017-05-28 RX ADMIN — PRAVASTATIN SODIUM 40 MG: 20 TABLET ORAL at 20:31

## 2017-05-28 RX ADMIN — FUROSEMIDE 20 MG: 10 INJECTION, SOLUTION INTRAMUSCULAR; INTRAVENOUS at 20:31

## 2017-05-28 RX ADMIN — PREGABALIN 75 MG: 75 CAPSULE ORAL at 08:53

## 2017-05-28 RX ADMIN — PIPERACILLIN SODIUM AND TAZOBACTAM SODIUM 4.5 G: 4; .5 INJECTION, POWDER, FOR SOLUTION INTRAVENOUS at 10:00

## 2017-05-28 RX ADMIN — ONDANSETRON 4 MG: 2 INJECTION INTRAMUSCULAR; INTRAVENOUS at 14:37

## 2017-05-28 RX ADMIN — PIPERACILLIN SODIUM AND TAZOBACTAM SODIUM 4.5 G: 4; .5 INJECTION, POWDER, FOR SOLUTION INTRAVENOUS at 21:33

## 2017-05-28 RX ADMIN — METHYLPREDNISOLONE SODIUM SUCCINATE 62.5 MG: 125 INJECTION, POWDER, FOR SOLUTION INTRAMUSCULAR; INTRAVENOUS at 15:21

## 2017-05-28 RX ADMIN — METHYLPREDNISOLONE SODIUM SUCCINATE 62.5 MG: 125 INJECTION, POWDER, FOR SOLUTION INTRAMUSCULAR; INTRAVENOUS at 06:13

## 2017-05-28 RX ADMIN — INSULIN LISPRO 3 UNITS: 100 INJECTION, SOLUTION INTRAVENOUS; SUBCUTANEOUS at 20:36

## 2017-05-28 RX ADMIN — TIZANIDINE 2 MG: 4 TABLET ORAL at 08:54

## 2017-05-28 RX ADMIN — METHYLPREDNISOLONE SODIUM SUCCINATE 62.5 MG: 125 INJECTION, POWDER, FOR SOLUTION INTRAMUSCULAR; INTRAVENOUS at 20:31

## 2017-05-28 RX ADMIN — OXYCODONE HYDROCHLORIDE 5 MG: 5 TABLET ORAL at 14:31

## 2017-05-28 RX ADMIN — ENOXAPARIN SODIUM 40 MG: 100 INJECTION SUBCUTANEOUS at 08:55

## 2017-05-28 RX ADMIN — METOPROLOL TARTRATE 25 MG: 25 TABLET, FILM COATED ORAL at 08:54

## 2017-05-28 RX ADMIN — Medication 500 MG: at 17:03

## 2017-05-28 RX ADMIN — SERTRALINE 50 MG: 50 TABLET, FILM COATED ORAL at 20:31

## 2017-05-28 RX ADMIN — OXYBUTYNIN CHLORIDE 10 MG: 10 TABLET, FILM COATED, EXTENDED RELEASE ORAL at 20:32

## 2017-05-28 RX ADMIN — CARBAMAZEPINE 200 MG: 100 SUSPENSION ORAL at 09:12

## 2017-05-28 RX ADMIN — STANDARDIZED SENNA CONCENTRATE AND DOCUSATE SODIUM 2 TABLET: 8.6; 5 TABLET, FILM COATED ORAL at 08:53

## 2017-05-28 RX ADMIN — KETOROLAC TROMETHAMINE 30 MG: 30 INJECTION, SOLUTION INTRAMUSCULAR; INTRAVENOUS at 23:56

## 2017-05-28 RX ADMIN — DOXYCYCLINE 100 MG: 100 TABLET ORAL at 20:31

## 2017-05-28 RX ADMIN — FUROSEMIDE 20 MG: 10 INJECTION, SOLUTION INTRAMUSCULAR; INTRAVENOUS at 15:21

## 2017-05-28 RX ADMIN — PIPERACILLIN SODIUM AND TAZOBACTAM SODIUM 4.5 G: 4; .5 INJECTION, POWDER, FOR SOLUTION INTRAVENOUS at 15:21

## 2017-05-28 RX ADMIN — CARBAMAZEPINE 200 MG: 100 SUSPENSION ORAL at 20:34

## 2017-05-28 RX ADMIN — TIZANIDINE 2 MG: 4 TABLET ORAL at 20:32

## 2017-05-28 RX ADMIN — FUROSEMIDE 20 MG: 10 INJECTION, SOLUTION INTRAMUSCULAR; INTRAVENOUS at 06:13

## 2017-05-28 RX ADMIN — INSULIN LISPRO 3 UNITS: 100 INJECTION, SOLUTION INTRAVENOUS; SUBCUTANEOUS at 10:30

## 2017-05-28 RX ADMIN — PIPERACILLIN SODIUM AND TAZOBACTAM SODIUM 4.5 G: 4; .5 INJECTION, POWDER, FOR SOLUTION INTRAVENOUS at 04:20

## 2017-05-28 RX ADMIN — Medication 500 MG: at 08:53

## 2017-05-28 RX ADMIN — METOPROLOL TARTRATE 25 MG: 25 TABLET, FILM COATED ORAL at 20:31

## 2017-05-28 ASSESSMENT — PAIN SCALES - GENERAL
PAINLEVEL_OUTOF10: 0
PAINLEVEL_OUTOF10: 6
PAINLEVEL_OUTOF10: 0
PAINLEVEL_OUTOF10: 10
PAINLEVEL_OUTOF10: 0
PAINLEVEL_OUTOF10: 6
PAINLEVEL_OUTOF10: 0

## 2017-05-28 ASSESSMENT — PULMONARY FUNCTION TESTS
EPAP_CMH2O: 8

## 2017-05-28 NOTE — PROGRESS NOTES
[facility] [place] Internal Medicine Interval Note    Name Justa Segovia       1960   Age/Sex 56 y.o. female   MRN [id number]   Code Status FULL     After 5PM or if no immediate response to page, please call for cross-coverage  Attending/Team: Dr Chavarria/MARISOL Call [phone] to page    2nd Call - Day Sr. Resident (R2/R3):   Dr. Warren         Chief complaint/ reason for interval visit   55 yo female with PMHx of multiple sclerosis, DLD, migraine initially admitted for MS flare vs temporal arteritis vs trigeminal neuralgia. Patient was started on steroids and carbamazepine. Surgery was consulted for temporal artery biopsy. Two days after admission, patient spiked fever. She was started on ceftriaxone, doxycycline. Rapid response was called today evening due to worsening respiratory status, sepsis with fever and WBC elevation,  and she was then transferred to ICU .  Started on vanco, zosyn, sepsis protocol initiated; on HFNC; FULL code.      Interval Problem Daily Status Update     req improved:  On 4 L NC ; OFF BiPAP    Afebrile  Started having cough, but nothing expectorating  CXR: no significant changes in BL infiltrates; small R sided pleural effusion  Left sided headache controlled with oxycodone; s/p TA biopsy, result is pending.   Cortack was placed yesterday due to high risk for aspiration  No neuro changes  Sufficient UO  WBC up to 19.8  Started on KCl 40 mEq QD  Passed swallow eval - on CLD now    Principal Problem:    Unilateral neuralgiform headache POA: Unknown  Active Problems:    Multiple sclerosis (CMS-[facility])      Overview: Pt has hx of MS since the age of 22, started as Seizures managed with       Phenobarbital and diagnosed 10 yrs later as MS      Hx of worst episode 12 yrs ago requiring very high doses of Steroids (1200       mg ) * 5days      Was in remission for 10 yrs      Symptoms recurred since Thursday  (May 18th 2017)      Leg cramps, heaviness of both legs,  followed by severe headache unilateral       left side temporal region      Bladder control lost since yesterday          Pneumonia likely 2/2 aspiration    Hyperlipidemia POA: Unknown    Depression POA: Unknown    History of cardiac arrhythmia POA: Unknown    Hyperglycemia POA: Unknown    Neuropathy of both feet    Temporal arteritis (CMS-HCC) POA: Unknown    Respiratory failure, acute (CMS-[facility]) POA: Unknown    Multiple sclerosis exacerbation (CMS-[facility]) POA: Unknown  Resolved Problems:    * No resolved hospital problems. *      Review of Systems   Constitutional: Negative for fever and chills.   HENT: Negative for congestion, ear pain, sore throat and tinnitus.     +Headache  Eyes: Negative for blurred vision, double vision and pain.   Respiratory: + cough, NGT for hemoptysis and sputum production.    Cardiovascular: Negative for chest pain, palpitations and leg swelling.   Gastrointestinal: Negative for heartburn, nausea, vomiting, abdominal pain, diarrhea and constipation.   Genitourinary: Negative for dysuria, urgency, frequency and hematuria.       urinary Incontinence   (currently Schmitt is in)  Musculoskeletal: Negative.  Negative for myalgias, back pain and joint pain.   Skin: Negative for itching and rash.   Neurological: Positive for generalized weakness and headaches . Negative for dizziness, tremors, sensory change, speech change, focal weakness, seizures and loss of consciousness.   Psychiatric/Behavioral: Negative.      Consultants  Neurology  ()  Vascular (Dr. Faith)  PMA/Dr Chavarria    Disposition  ICU: respiratory failure and sepsis      Quality Measures  Core Measures  EKG reviewed, Labs reviewed, Medications reviewed and Radiology images reviewed  Schmitt catheter: Yes  DVT Prophylaxis: Lovenox  GI prophylaxis: None  Tubes: None  Abx: IV Vanco, Zosyn, doxy  Lines: PIV   Schmitt: None      Physical Exam       Filed Vitals:    05/28/17 0400 05/28/17 0500 05/28/17 0600 05/28/17 0640   BP:        Pulse: 63 76 81 67   Temp: 37.1 °C (98.8 °F)  36.8 °C (98.2 °F)    Resp: 20 35 26 22   Height:       Weight: 81.2 kg (179 lb 0.2 oz)      SpO2: 95% 93% 93% 93%     Body mass index is 32.73 kg/(m^2). Weight: 81.2 kg (179 lb 0.2 oz)  Oxygen Therapy:  Pulse Oximetry: 93 %, O2 (LPM): 4, O2 Delivery: Silicone Nasal Cannula    Physical Exam   Constitutional: She is oriented to person, place, and time and well-developed, well-nourished, and in no distress.   HENT: no JVD  Head: Normocephalic and atraumatic.   Eyes: EOM are normal.   Neck: Normal range of motion.   Cardiovascular: Normal rate and regular rhythm.    No murmur heard.  Pulmonary/Chest: Mild respiratory distress. BL rhonchi and rales on inspiration and expiration  Abdominal: Soft. Bowel sounds are normal. She exhibits no distension. There is no tenderness.   Musculoskeletal: Normal range of motion. She exhibits BL leg puffiness  Neurological: She is alert and oriented to person, place, and time. She displays normal reflexes. No cranial nerve deficit. GCS score is 15.   5/5 strength throughout, No sensory deificts   Skin: She is not diaphoretic. no rash        Lab Data Review:      5/24/2017  6:18 AM    Recent Labs      05/26/17   0020  05/26/17 0410 05/27/17 0228 05/28/17   0455   SODIUM  133*  133*  134*  138   POTASSIUM  4.2  4.0  3.9  3.6   CHLORIDE  104  104  105  101   CO2  18*  19*  22  25   BUN  17  16  18  31*   CREATININE  0.94  0.91  0.85  0.97   MAGNESIUM  1.9   --    --    --    CALCIUM  8.0*  8.4*  8.7  8.5       Recent Labs      05/25/17   1937 05/26/17 0410  05/27/17 0228 05/28/17   0455   ALTSGPT  46   --    --    --    --    ASTSGOT  51*   --    --    --    --    ALKPHOSPHAT  131*   --    --    --    --    TBILIRUBIN  0.5   --    --    --    --    GLUCOSE  192*   < >  192*  154*  176*    < > = values in this interval not displayed.       Recent Labs      05/26/17   0020  05/27/17   0228  05/28/17   0455   RBC  2.95*  3.06*  3.61*    HEMOGLOBIN  9.5*  9.9*  11.5*   HEMATOCRIT  28.8*  29.2*  34.3*   PLATELETCT  241  258  298       Recent Labs      17   1937  17   0020  17   0228  17   0455   WBC  15.9*  11.8*  16.1*  19.8*   NEUTSPOLYS  90.30*  81.70*  94.80*  90.80*   LYMPHOCYTES  2.60*  4.30*  1.70*  6.70*   MONOCYTES  0.00  0.90  0.00  1.70   EOSINOPHILS  0.00  0.00  0.00  0.00   BASOPHILS  0.00  0.00  0.00  0.00   ASTSGOT  51*   --    --    --    ALTSGPT  46   --    --    --    ALKPHOSPHAT  131*   --    --    --    TBILIRUBIN  0.5   --    --    --            Assessment/Plan       Sepsis secondary to pulmonary source/PNA  Acute hypoxic respiratory failure - improving  Pneumonia   - SIRS: 3(fever, tachypnea, leukocytosis), lactic acid : 3.8. BP and HR stable  - CXR: bilateral pulmonary infiltrates  - Blood cx 17: NGTD. AB.45/26/58.5; on HFNC  -  influenza PCR NGT;   sputum C&S pending;  UA: mild protein, WBC 2-5;   -DDx also include vasculitis, pulmonary hemorrhage, aspiration PNA  - was on ceftriaxone and doxy started  to  , started on sepsis protocol, vancomycin, zosyn, cont doxycycline   Plan:  -cont zosyn, (mrsa swab (-), vanco stopped) doxycycline PO; solumedrol 62.5 q8h  -cotn lasix 20 mg IV TID for pulmonary congestion/fluid overload  -f/u blood and sputum cx (repeat Blood cx  due to continuous fever)  -bronchoscopy if developed respiratory distress   - supplemental oxygen: currently on 4 L NC; optional oxymask   -Echo : moderate PAH (RVSP 50mm), EF 60%, possible fluid overload    Unilateral neuralgiform headache  Possible GCA  - New onset, localized primarily to LEFT fronto-temporal region, mod-severe, sharp, intermittent  - Presentation on admission was worrisome for temporal arteritis vs other neurogenic etiology  - Initial work-up:  (140 at [facility]), CRP 48    - CT Head: No acute intracranial abnormality  - MRI C/L-spine/brain: Evidence chronic demyelinating  plaques of MS  - Neuro (Dr. Cosme) following >> cannot exclude Temporal Arteritis, but also continued current treatment for presumed Trigeminal Neuralgia.    - on Carbamazepine 200mg BID for possible trigeminal neuralgia  - temporal artery biopsy pending; could be falsely NGT due to pretreatment with steroids  -cont solumedrol 62.5 q8h      Fluid overload  -non collapsable IVC of Echo, leg puffines; +5 L since adm  -etiology include steroids vs preexisting PAH and RV disfunction  -continue lasix 20 mg IV TID, CTM I/O; KCl suppl    PAH  -question preexisitng PAH vs acute secondary to acute lung process  -cont current mgm as above    WBC elevation  -probably secondary to infection, PNA and steroids   -on AB as above; f/u cx  -continue current mgm    Elevated lactic acid  -likely due to sepsis  -improved    Elevated liver enzymes, mild  - patient denies abdominal symptoms  - closely monitor as patient is on carbamazepine    ?MS flare  Multiple sclerosis   - Diagnosed 34 years ago and followed by [place] [facility] (Dr. Asmita Pollard)  - Has had hx of flare requiring high dose IV Steroids x5 days, chronic urinary incontinence  - Neuro (Dr. Cosme) consulted; received IV Solumedrol 500mg BID, Tecfidera  - on Oxybutynin for incontinence; Schmitt in for critical condition  - neurology on board      Anemia, normocytic  -Iron studies pending      Hyperlipidemia  - Cont Pravastatin 40mg QD    Depression  - Cont Sertraline 50mg QD    History of cardiac arrhythmia  -  cont home Metoprolol 25mg BID (with holding parameters if HR < 50)    Hyperglycemia  - Likely exacerbated by IV Steroids  - ISS, Hypoglycemic protocol, POC     Neuropathy of both feet  - Cont Lyrica 75mg BID, Tizanidine 2mg BID

## 2017-05-28 NOTE — CARE PLAN
Problem: Safety  Goal: Will remain free from falls  Intervention: Implement fall precautions  Bed locked and in low position. Bed alarm on.       Problem: Respiratory:  Goal: Respiratory status will improve  Intervention: Assess and monitor pulmonary status  Collaborate with RT and MD on respiratory POC. Monitor s/s of respiratory distress.

## 2017-05-28 NOTE — DISCHARGE PLANNING
Medical Social Work    SW received page pt's wife requesting letter stating hospitalization for lodging assistance.  GILSON drafted letter and faxed letter to nurses station with SW contact information.  No further needs at this time.

## 2017-05-28 NOTE — PROGRESS NOTES
Pulmonary Critical Care Progress Note      Date of service: 5/28/2017    Chief Complaint: Multiple sclerosis flare up.     History of Present Illness: I was kindly asked by Dr. Ivan to see and evaluate this lady regarding the above problems.  This is a 56-year-old lady who was admitted to [facility] on or about 05/22/2017.  She presented with an elevated Westergren sed rate, headache, nausea and vomiting.  She was felt to possibly have temporal arteritis.  She has a  history of multiple sclerosis.  She was started on high dose methylprednisolone.  She was seen by neurology.  The plan is for her to have a temporal artery biopsy tomorrow.  Today, I was called because of tachypnea, fever and increasing oxygen requirements.  She has now been transferred to the intensive care unit.      ROS:  Respiratory: positive cough, negative shortness of breath and negative sputum production, Cardiac: negative chest pain, GI: negative abdominal pain.  All other systems negative.      Interval Events:  24 hour interval history reviewed   Tmax 99.7, WBC slightly elevated. No bands.   -1.4L over the last 24 hours, +5.7L since admit.  CXR shows: improved dense right lower lobe consolidation with air bronchogram. Ongoing multilobar infiltrate predominant lower lung fields bilaterally.    Off BiPAP. Currently on 4L NC.   One complaint of pain at 6:00 AM   SR, -115 systolic.   Passed swallow evaluation.   900 cc from holt overnight.   Mobile to bedside commode.       PFSH:  No change.      Physical Exam  General: Conversing and asking appropriate questions.   Neuro/Psych: Alert and oriented.   HEENT: PERRL.   CVS: Sinus rhythm, regular rate.   Respiratory: Breathing comfortably on NC. Diminished.   Abdomen/: Soft, non-tender.  Extremities: 2-3+ diffuse edema.       Respiratory:     Pulse Oximetry: 93 %  ImagingAvailable data reviewed   Recent Labs      05/25/17   1932   [id number]  7.45   [id number]  26.0    [id number]  58.5*   [id number]  89.4*   ARTHCO3  18   ARTBE  -5*       HemoDynamics:  Pulse: 67, Heart Rate (Monitored): 66  NIBP: 113/50 mmHg    Imaging: Available data reviewed       Recent Labs      05/25/17   1937   TROPONINI  0.02     Neuro:  GCS Total [place]berry Coma Score: 15  Imaging: Available data reviewed    Fluids:  Intake/Output       05/26/17 0700 - 05/27/17 0659 05/27/17 0700 - 05/28/17 0659 05/28/17 0700 - 05/29/17 0659      5130-3218 0871-2068 Total 0700-1859 0261-2758 Total 8909-8102 8332-5561 Total       Intake    P.O.  --  -- --  --  0 0  --  -- --    P.O. -- -- -- -- 0 0 -- -- --    I.V.  2075  950 3025  200  200 400  --  -- --    Crystalloid Intake 600 -- 600 -- -- -- -- -- --    IV Volume (NS)  -- -- -- -- -- --    IV Volume (Sepsis bolus) -- 350 350 -- -- -- -- -- --    IV Piggyback Volume (IV Piggyback) 750 -- 750 200 200 400 -- -- --    Other  --  120 120  --  360 360  --  -- --    Medications (P.O./ Enteral Liquids) -- 120 120 -- 360 360 -- -- --    Enteral  --  90 90  125  400 525  --  -- --    Enteral Volume -- -- -- 125 400 525 -- -- --    Free Water / Tube Flush -- 90 90 -- -- -- -- -- --    Total Intake 2075 1160 3235  -- -- --       Output    Urine  625  460 1085  1805  900 2705  --  -- --    Indwelling Cathether  2577 807 8406 -- -- --    Stool/Urine  --  -- --  --  0 0  --  -- --    Measurable Stool (ml) -- -- -- -- 0 0 -- -- --    Stool  --  -- --  --  -- --  --  -- --    Number of Times Stooled -- -- -- -- 0 x 0 x -- -- --    Blood  20  -- 20  --  -- --  --  -- --    Est. Blood Loss (mL) 20 -- 20 -- -- -- -- -- --    Total Output  6622 751 5425 -- -- --       Net I/O     6704 072 6113 -1480 60 -1420 -- -- --        Weight: 81.2 kg (179 lb 0.2 oz)  Recent Labs      05/26/17   0020  05/26/17   0410  05/27/17   0228  05/28/17   0455   SODIUM  133*  133*  134*  138   POTASSIUM  4.2  4.0  3.9  3.6   CHLORIDE  104  104  105  101   CO2  18*   19*  22  25   BUN  17  16  18  31*   CREATININE  0.94  0.91  0.85  0.97   MAGNESIUM  1.9   --    --    --    CALCIUM  8.0*  8.4*  8.7  8.5     GI/Nutrition:  Imaging: Available data reviewed  taking PO     Liver Function:  Recent Labs      17   ALTSGPT  46   --    --    --    --    ASTSGOT  51*   --    --    --    --    ALKPHOSPHAT  131*   --    --    --    --    TBILIRUBIN  0.5   --    --    --    --    GLUCOSE  192*   < >  192*  154*  176*    < > = values in this interval not displayed.     Heme:  Recent Labs      17   RBC  2.95*  3.06*  3.61*   HEMOGLOBIN  9.5*  9.9*  11.5*   HEMATOCRIT  28.8*  29.2*  34.3*   PLATELETCT  241  258  298     Infectious Disease:  Temp  Av.1 °C (98.7 °F)  Min: 36.2 °C (97.2 °F)  Max: 37.6 °C (99.7 °F)  Micro: reviewed     Recent Labs      17   WBC  15.9*  11.8*  16.1*  19.8*   NEUTSPOLYS  90.30*  81.70*  94.80*  90.80*   LYMPHOCYTES  2.60*  4.30*  1.70*  6.70*   MONOCYTES  0.00  0.90  0.00  1.70   EOSINOPHILS  0.00  0.00  0.00  0.00   BASOPHILS  0.00  0.00  0.00  0.00   ASTSGOT  51*   --    --    --    ALTSGPT  46   --    --    --    ALKPHOSPHAT  131*   --    --    --    TBILIRUBIN  0.5   --    --    --      Current Facility-Administered Medications   Medication Dose Frequency Provider Last Rate Last Dose   • furosemide (LASIX) injection 20 mg  20 mg Q8HRS Vitaliy Chavarria M.D.   20 mg at 17 0613   • Dimethyl fumarate (TECFIDERA) 240 mg Capsules  240 mg BID VENUS Guerrero M.D.   240 mg at 17 2100   • methylPREDNISolone sod succ (SOLU-MEDROL) 125 MG injection 62.5 mg  62.5 mg Q8HRS Jinny MORALES M.D.   62.5 mg at 17 0613   • calcium carbonate (OS-JAYESH 500) tablet 500 mg  500 mg BID WITH MEALS Jinny MORALES M.D.   500 mg at 17 1710   • oxycodone immediate-release (ROXICODONE) tablet 5  mg  5 mg Q6HRS PRN Vikash Warren M.D.   5 mg at 05/28/17 0618   • carbamazepine (TEGRETOL) 100 MG/5ML suspension 200 mg  200 mg BID Navin Cosme M.D.   200 mg at 05/27/17 2043   • doxycycline monohydrate (ADOXA) tablet 100 mg  100 mg Q12HRS Jonny Laws M.D.   100 mg at 05/27/17 2047   • acetaminophen (TYLENOL) tablet 650 mg  650 mg Q4HRS PRN Vikash Warren M.D.   650 mg at 05/27/17 0605   • albuterol (PROVENTIL) 2.5mg/0.5ml nebulizer solution 2.5 mg  2.5 mg Q4H PRN (RT) Navin Cosme M.D.   2.5 mg at 05/25/17 1358   • albuterol inhaler 2 Puff  2 Puff Q2HRS PRN Navin Cosme M.D.   2 Puff at 05/25/17 1946   • acetaminophen (TYLENOL) suppository 650 mg  650 mg Q6HRS PRN Tommy García M.D.   Stopped at 05/26/17 1135   • piperacillin-tazobactam (ZOSYN) 4.5 g in  mL IVPB  4.5 g Q6HRS Jonny Laws M.D.   Stopped at 05/28/17 0520   • insulin lispro (HUMALOG) injection 2-9 Units  2-9 Units 4X/DAY SILVANO Em M.D.   2 Units at 05/28/17 0613   • senna-docusate (PERICOLACE or SENOKOT S) 8.6-50 MG per tablet 2 Tab  2 Tab BID Ree Frazier M.D.   2 Tab at 05/27/17 2049    And   • polyethylene glycol/lytes (MIRALAX) PACKET 1 Packet  1 Packet QDAY PRN Ree Frazier M.D.   Stopped at 05/26/17 0903    And   • magnesium hydroxide (MILK OF MAGNESIA) suspension 30 mL  30 mL QDAY PRN Ree Frazier M.D.   30 mL at 05/26/17 0910    And   • bisacodyl (DULCOLAX) suppository 10 mg  10 mg QDAY PRN Ree Frazier M.D.   10 mg at 05/26/17 2141   • Respiratory Care per Protocol   Continuous RT Ree Frazier M.D.       • enoxaparin (LOVENOX) inj 40 mg  40 mg DAILY Ree Frazier M.D.   40 mg at 05/27/17 0852   • pregabalin (LYRICA) capsule 75 mg  75 mg BID Ree Frazier M.D.   75 mg at 05/27/17 2048   • sertraline (ZOLOFT) tablet 50 mg  50 mg QHS Ree Frazier M.D.   50 mg at 05/27/17 2048   • tizanidine (ZANAFLEX) tablet 2 mg  2 mg BID Ree Frazier M.D.   2 mg at 05/27/17 2049   •  oxybutynin SR (DITROPAN-XL) tablet 10 mg  10 mg Q EVENING Ree Frazier M.D.   10 mg at 05/27/17 2048   • ondansetron (ZOFRAN) syringe/vial injection 4 mg  4 mg Q4HRS PRN Ree Frazier M.D.   4 mg at 05/23/17 0524   • ondansetron (ZOFRAN ODT) dispertab 4 mg  4 mg Q4HRS PRN Ree Frazier M.D.       • promethazine (PHENERGAN) tablet 12.5-25 mg  12.5-25 mg Q4HRS PRN Ree Frazier M.D.       • promethazine (PHENERGAN) suppository 12.5-25 mg  12.5-25 mg Q4HRS PRN Ree Frazier M.D.       • prochlorperazine (COMPAZINE) injection 5-10 mg  5-10 mg Q4HRS PRN Ree Frazier M.D.   10 mg at 05/23/17 0557   • pravastatin (PRAVACHOL) tablet 40 mg  40 mg Nightly Ree Frazier M.D.   40 mg at 05/27/17 2048   • metoprolol (LOPRESSOR) tablet 25 mg  25 mg TWICE DAILY Ree Frazier M.D.   25 mg at 05/27/17 2048     Last reviewed on 5/26/2017  5:15 PM by Marisa Rucker R.N.    Quality  Measures:  Labs reviewed, Medications reviewed and Radiology images reviewed                       Lines:  PIV    Gtts:  none    Abx:  Doxycycline   Vancomycin stopped 5/27  Zosyn    bcx 5/24 ngtd  bcx 5/27 ngtd  MRSA negative  Flu (-) 5/25    Echo 5/26 - EF 60%, RVSP 48-50    62.5 q8 mg Solumedrol   Lasix 20 Q8    Assessment and Plan:  -  Acute hypoxemic respiratory failure.   - Aspiration/Pna +/- fluid   - HFNC -> bipap ->6L, 5/27 worsened right sided consolidation with air bronchogram, however clinically feeling better, cxr 5/28 w marginal improvement   - unusual to have pulmonary involvement w GCA/TA, h/h stable over admit (mild dilution), no renal involvement   - rt/02 protocols   - continue trial of gentle diureses    - IS/PEP/IPV  -  Pneumonia.   - empiric broad spectrum abx   - mrsa swab (-), vanco stopped   - f/u cultures  -  Sepsis, pulmonary source.  -  Query temporal arteritis.     - s/p 3 days of solumedrol 500 q12 on 5/25   - s/p bx 5/26 pending result   - esr 140->115   - headache improving   - continue solumedrol 62.5 q8  -   Leukocytosis   - up/down, minimal bands today   - not requiring pressors   - cultures pending   - on empiric abx coverage  -  Multiple sclerosis with query acute flare   - neuro following   - on tecfidera   - on steroids  -  Anemia.  -  History of seizures.    Diuresis today, stop all maintenance.   Lasix q8.     Discussed patient condition and risk of morbidity and/or mortality with RN, RT, Pharmacy, Charge nurse / hot rounds, Patient and [facility].    The patient remains critically ill.  Critical care time = 45 minutes in directly providing and coordinating critical care and extensive data review.  No time overlap and excludes procedures.    Fabiana MONTOYA (Chris), am scribing for, and in the presence of, Vitaliy Chavarria MD.   Electronically signed by: Fabiana Moeller (Chris), 5/28/2017  Vitaliy MONTOYA MD. personally performed the services described in this documentation, as scribed by Fabiana Moeller in my presence, and it is both accurate and complete.

## 2017-05-28 NOTE — PROGRESS NOTES
Neurology Progress Note               Author: VENUS Louisa Cesar Date & Time created: 5/28/2017  8:36 AM     Interval History:  No new sx.  L temporal HA is improved, still present.  Took a percocet this am.  Path still pending on temporal artery biopsy.    Review of Systems:  ROS    Physical Exam:  Physical Exam   Neurological:   Alert, conversant.  Again has mild L ptosis.  Strength appears =, DTR are =.       Labs:  Recent Labs      05/25/17 1932   [id number]  7.45   [id number]  26.0   [id number]  58.5*   [id number]  89.4*   ARTHCO3  18   ARTBE  -5*     Recent Labs      05/25/17 1937   TROPONINI  0.02     Recent Labs      05/26/17 0020 05/26/17 0410 05/27/17 0228 05/28/17 0455   SODIUM  133*  133*  134*  138   POTASSIUM  4.2  4.0  3.9  3.6   CHLORIDE  104  104  105  101   CO2  18*  19*  22  25   BUN  17  16  18  31*   CREATININE  0.94  0.91  0.85  0.97   MAGNESIUM  1.9   --    --    --    CALCIUM  8.0*  8.4*  8.7  8.5     Recent Labs      05/25/17 1937 05/26/17 0410 05/27/17 0228 05/28/17 0455   ALTSGPT  46   --    --    --    --    ASTSGOT  51*   --    --    --    --    ALKPHOSPHAT  131*   --    --    --    --    TBILIRUBIN  0.5   --    --    --    --    GLUCOSE  192*   < >  192*  154*  176*    < > = values in this interval not displayed.     Recent Labs      05/26/17 0020 05/27/17 0228 05/28/17 0455   RBC  2.95*  3.06*  3.61*   HEMOGLOBIN  9.5*  9.9*  11.5*   HEMATOCRIT  28.8*  29.2*  34.3*   PLATELETCT  241  258  298     Recent Labs      05/25/17 1937 05/26/17 0020  05/27/17 0228 05/28/17 0455   WBC  15.9*  11.8*  16.1*  19.8*   NEUTSPOLYS  90.30*  81.70*  94.80*  90.80*   LYMPHOCYTES  2.60*  4.30*  1.70*  6.70*   MONOCYTES  0.00  0.90  0.00  1.70   EOSINOPHILS  0.00  0.00  0.00  0.00   BASOPHILS  0.00  0.00  0.00  0.00   ASTSGOT  51*   --    --    --    ALTSGPT  46   --    --    --    ALKPHOSPHAT  131*   --    --    --    TBILIRUBIN  0.5   --    --    --      Recent Labs       17   0410  17   0228  17   0455   SODIUM  133*  134*  138   POTASSIUM  4.0  3.9  3.6   CHLORIDE  104  105  101   CO2  19*  22  25   GLUCOSE  192*  154*  176*   BUN  16  18  31*   CREATININE  0.91  0.85  0.97   CALCIUM  8.4*  8.7  8.5     Hemodynamics:  Temp (24hrs), Av.1 °C (98.7 °F), Min:36.2 °C (97.2 °F), Max:37.6 °C (99.7 °F)  Temperature: 36.8 °C (98.2 °F)  Pulse  Av.7  Min: 50  Max: 94Heart Rate (Monitored): 66  NIBP: 113/50 mmHg     Respiratory:    Respiration: (!) 22, Pulse Oximetry: 93 %, O2 Daily Delivery Respiratory : Silicone Nasal Cannula     Work Of Breathing / Effort: Mild;Moderate  RUL Breath Sounds: Clear, RML Breath Sounds: Diminished, RLL Breath Sounds: Diminished, JOCELYNE Breath Sounds: Clear, LLL Breath Sounds: Diminished  Fluids:    Intake/Output Summary (Last 24 hours) at 17 0836  Last data filed at 17 0600   Gross per 24 hour   Intake   1285 ml   Output   2675 ml   Net  -1390 ml     Weight: 81.2 kg (179 lb 0.2 oz)  GI/Nutrition:  Orders Placed This Encounter   Procedures   • DIET NPO     Standing Status: Standing      Number of Occurrences: 1      Standing Expiration Date:      Order Specific Question:  Restrict to:     Answer:  Strict [1]     Medical Decision Making, by Problem:  Active Hospital Problems    Diagnosis   • *Unilateral neuralgiform headache [R51]   • Pneumonia likely 2/2 aspiration [J18.9]   • Multiple sclerosis (CMS-HCC) [G35]   • Neuropathy of both feet [G57.93]   • Hyperlipidemia [E78.5]   • Depression [F32.9]   • History of cardiac arrhythmia [Z86.79]   • Hyperglycemia [R73.9]   • Temporal arteritis (CMS-HCC) [M31.6]   • Respiratory failure, acute (CMS-HCC) [J96.00]   • Multiple sclerosis exacerbation (CMS-HCC) [G35]       Plan:  Await biopsy results.  Still unclear if all her sx were due to an underlying infection or if she has a coexistant vasculitis.  Continue steroids for now.  Repeat sed rate in am.    Core Measures

## 2017-05-29 ENCOUNTER — APPOINTMENT (OUTPATIENT)
Dept: RADIOLOGY | Facility: MEDICAL CENTER | Age: 57
DRG: 040 | End: 2017-05-29
Attending: HOSPITALIST
Payer: MEDICARE

## 2017-05-29 LAB
ALBUMIN SERPL BCP-MCNC: 2.2 G/DL (ref 3.2–4.9)
ALBUMIN/GLOB SERPL: 0.6 G/DL
ALP SERPL-CCNC: 129 U/L (ref 30–99)
ALT SERPL-CCNC: 255 U/L (ref 2–50)
ANION GAP SERPL CALC-SCNC: 10 MMOL/L (ref 0–11.9)
AST SERPL-CCNC: 129 U/L (ref 12–45)
BACTERIA BLD CULT: NORMAL
BACTERIA BLD CULT: NORMAL
BASOPHILS # BLD AUTO: 0.4 % (ref 0–1.8)
BASOPHILS # BLD: 0.05 K/UL (ref 0–0.12)
BILIRUB SERPL-MCNC: 0.5 MG/DL (ref 0.1–1.5)
BUN SERPL-MCNC: 30 MG/DL (ref 8–22)
CALCIUM SERPL-MCNC: 8.3 MG/DL (ref 8.5–10.5)
CHLORIDE SERPL-SCNC: 102 MMOL/L (ref 96–112)
CO2 SERPL-SCNC: 27 MMOL/L (ref 20–33)
CREAT SERPL-MCNC: 0.76 MG/DL (ref 0.5–1.4)
EKG IMPRESSION: NORMAL
EOSINOPHIL # BLD AUTO: 0.01 K/UL (ref 0–0.51)
EOSINOPHIL NFR BLD: 0.1 % (ref 0–6.9)
ERYTHROCYTE [DISTWIDTH] IN BLOOD BY AUTOMATED COUNT: 47.2 FL (ref 35.9–50)
GFR SERPL CREATININE-BSD FRML MDRD: >60 ML/MIN/1.73 M 2
GLOBULIN SER CALC-MCNC: 3.5 G/DL (ref 1.9–3.5)
GLUCOSE BLD-MCNC: 108 MG/DL (ref 65–99)
GLUCOSE BLD-MCNC: 152 MG/DL (ref 65–99)
GLUCOSE BLD-MCNC: 197 MG/DL (ref 65–99)
GLUCOSE SERPL-MCNC: 180 MG/DL (ref 65–99)
HCT VFR BLD AUTO: 31.6 % (ref 37–47)
HGB BLD-MCNC: 10.6 G/DL (ref 12–16)
IMM GRANULOCYTES # BLD AUTO: 0.63 K/UL (ref 0–0.11)
IMM GRANULOCYTES NFR BLD AUTO: 4.5 % (ref 0–0.9)
LYMPHOCYTES # BLD AUTO: 0.47 K/UL (ref 1–4.8)
LYMPHOCYTES NFR BLD: 3.3 % (ref 22–41)
MCH RBC QN AUTO: 31.7 PG (ref 27–33)
MCHC RBC AUTO-ENTMCNC: 33.5 G/DL (ref 33.6–35)
MCV RBC AUTO: 94.6 FL (ref 81.4–97.8)
MONOCYTES # BLD AUTO: 0.25 K/UL (ref 0–0.85)
MONOCYTES NFR BLD AUTO: 1.8 % (ref 0–13.4)
NEUTROPHILS # BLD AUTO: 12.73 K/UL (ref 2–7.15)
NEUTROPHILS NFR BLD: 89.9 % (ref 44–72)
NRBC # BLD AUTO: 0 K/UL
NRBC BLD AUTO-RTO: 0 /100 WBC
PLATELET # BLD AUTO: 313 K/UL (ref 164–446)
PMV BLD AUTO: 11.1 FL (ref 9–12.9)
POTASSIUM SERPL-SCNC: 3.9 MMOL/L (ref 3.6–5.5)
PROT SERPL-MCNC: 5.7 G/DL (ref 6–8.2)
RBC # BLD AUTO: 3.34 M/UL (ref 4.2–5.4)
SIGNIFICANT IND 70042: NORMAL
SIGNIFICANT IND 70042: NORMAL
SITE SITE: NORMAL
SITE SITE: NORMAL
SODIUM SERPL-SCNC: 139 MMOL/L (ref 135–145)
SOURCE SOURCE: NORMAL
SOURCE SOURCE: NORMAL
TROPONIN I SERPL-MCNC: <0.01 NG/ML (ref 0–0.04)
WBC # BLD AUTO: 14.1 K/UL (ref 4.8–10.8)

## 2017-05-29 PROCEDURE — 700111 HCHG RX REV CODE 636 W/ 250 OVERRIDE (IP): Performed by: INTERNAL MEDICINE

## 2017-05-29 PROCEDURE — A9270 NON-COVERED ITEM OR SERVICE: HCPCS | Performed by: HOSPITALIST

## 2017-05-29 PROCEDURE — 99291 CRITICAL CARE FIRST HOUR: CPT | Performed by: INTERNAL MEDICINE

## 2017-05-29 PROCEDURE — 700105 HCHG RX REV CODE 258: Performed by: INTERNAL MEDICINE

## 2017-05-29 PROCEDURE — 700102 HCHG RX REV CODE 250 W/ 637 OVERRIDE(OP): Performed by: INTERNAL MEDICINE

## 2017-05-29 PROCEDURE — 700102 HCHG RX REV CODE 250 W/ 637 OVERRIDE(OP): Performed by: SPECIALIST

## 2017-05-29 PROCEDURE — 80053 COMPREHEN METABOLIC PANEL: CPT

## 2017-05-29 PROCEDURE — 700102 HCHG RX REV CODE 250 W/ 637 OVERRIDE(OP): Performed by: PSYCHIATRY & NEUROLOGY

## 2017-05-29 PROCEDURE — 85025 COMPLETE CBC W/AUTO DIFF WBC: CPT

## 2017-05-29 PROCEDURE — 700111 HCHG RX REV CODE 636 W/ 250 OVERRIDE (IP): Performed by: HOSPITALIST

## 2017-05-29 PROCEDURE — 700102 HCHG RX REV CODE 250 W/ 637 OVERRIDE(OP): Performed by: HOSPITALIST

## 2017-05-29 PROCEDURE — 82962 GLUCOSE BLOOD TEST: CPT

## 2017-05-29 PROCEDURE — A9270 NON-COVERED ITEM OR SERVICE: HCPCS | Performed by: INTERNAL MEDICINE

## 2017-05-29 PROCEDURE — A9270 NON-COVERED ITEM OR SERVICE: HCPCS | Performed by: SPECIALIST

## 2017-05-29 PROCEDURE — A9270 NON-COVERED ITEM OR SERVICE: HCPCS | Performed by: PSYCHIATRY & NEUROLOGY

## 2017-05-29 PROCEDURE — 92526 ORAL FUNCTION THERAPY: CPT

## 2017-05-29 PROCEDURE — 700111 HCHG RX REV CODE 636 W/ 250 OVERRIDE (IP): Performed by: SPECIALIST

## 2017-05-29 PROCEDURE — 94669 MECHANICAL CHEST WALL OSCILL: CPT

## 2017-05-29 PROCEDURE — 770022 HCHG ROOM/CARE - ICU (200)

## 2017-05-29 PROCEDURE — 94003 VENT MGMT INPAT SUBQ DAY: CPT

## 2017-05-29 PROCEDURE — 94667 MNPJ CHEST WALL 1ST: CPT

## 2017-05-29 RX ORDER — METHYLPREDNISOLONE SODIUM SUCCINATE 125 MG/2ML
62.5 INJECTION, POWDER, LYOPHILIZED, FOR SOLUTION INTRAMUSCULAR; INTRAVENOUS EVERY 12 HOURS
Status: DISCONTINUED | OUTPATIENT
Start: 2017-05-29 | End: 2017-05-31

## 2017-05-29 RX ORDER — CARBAMAZEPINE 100 MG/5ML
200 SUSPENSION ORAL 3 TIMES DAILY
Status: DISCONTINUED | OUTPATIENT
Start: 2017-05-29 | End: 2017-05-30

## 2017-05-29 RX ADMIN — PIPERACILLIN SODIUM AND TAZOBACTAM SODIUM 4.5 G: 4; .5 INJECTION, POWDER, FOR SOLUTION INTRAVENOUS at 04:35

## 2017-05-29 RX ADMIN — PIPERACILLIN SODIUM AND TAZOBACTAM SODIUM 4.5 G: 4; .5 INJECTION, POWDER, FOR SOLUTION INTRAVENOUS at 15:31

## 2017-05-29 RX ADMIN — CARBAMAZEPINE 200 MG: 100 SUSPENSION ORAL at 15:22

## 2017-05-29 RX ADMIN — TIZANIDINE 2 MG: 4 TABLET ORAL at 21:25

## 2017-05-29 RX ADMIN — CARBAMAZEPINE 200 MG: 100 SUSPENSION ORAL at 21:23

## 2017-05-29 RX ADMIN — FUROSEMIDE 20 MG: 10 INJECTION, SOLUTION INTRAMUSCULAR; INTRAVENOUS at 13:42

## 2017-05-29 RX ADMIN — PIPERACILLIN SODIUM AND TAZOBACTAM SODIUM 4.5 G: 4; .5 INJECTION, POWDER, FOR SOLUTION INTRAVENOUS at 21:25

## 2017-05-29 RX ADMIN — ONDANSETRON 4 MG: 2 INJECTION INTRAMUSCULAR; INTRAVENOUS at 18:12

## 2017-05-29 RX ADMIN — TIZANIDINE 2 MG: 4 TABLET ORAL at 08:35

## 2017-05-29 RX ADMIN — ACETAMINOPHEN 650 MG: 325 TABLET, FILM COATED ORAL at 11:19

## 2017-05-29 RX ADMIN — SERTRALINE 50 MG: 50 TABLET, FILM COATED ORAL at 21:22

## 2017-05-29 RX ADMIN — DOXYCYCLINE 100 MG: 100 TABLET ORAL at 21:22

## 2017-05-29 RX ADMIN — OXYBUTYNIN CHLORIDE 10 MG: 10 TABLET, FILM COATED, EXTENDED RELEASE ORAL at 21:24

## 2017-05-29 RX ADMIN — PRAVASTATIN SODIUM 40 MG: 20 TABLET ORAL at 21:22

## 2017-05-29 RX ADMIN — POTASSIUM CHLORIDE 40 MEQ: 1.5 POWDER, FOR SOLUTION ORAL at 08:31

## 2017-05-29 RX ADMIN — Medication 500 MG: at 06:45

## 2017-05-29 RX ADMIN — FUROSEMIDE 20 MG: 10 INJECTION, SOLUTION INTRAMUSCULAR; INTRAVENOUS at 05:05

## 2017-05-29 RX ADMIN — INSULIN LISPRO 2 UNITS: 100 INJECTION, SOLUTION INTRAVENOUS; SUBCUTANEOUS at 06:44

## 2017-05-29 RX ADMIN — INSULIN LISPRO 2 UNITS: 100 INJECTION, SOLUTION INTRAVENOUS; SUBCUTANEOUS at 10:55

## 2017-05-29 RX ADMIN — ACETAMINOPHEN 650 MG: 325 TABLET, FILM COATED ORAL at 21:28

## 2017-05-29 RX ADMIN — METHYLPREDNISOLONE SODIUM SUCCINATE 62.5 MG: 125 INJECTION, POWDER, FOR SOLUTION INTRAMUSCULAR; INTRAVENOUS at 21:22

## 2017-05-29 RX ADMIN — ENOXAPARIN SODIUM 40 MG: 100 INJECTION SUBCUTANEOUS at 08:32

## 2017-05-29 RX ADMIN — DOXYCYCLINE 100 MG: 100 TABLET ORAL at 08:32

## 2017-05-29 RX ADMIN — FUROSEMIDE 20 MG: 10 INJECTION, SOLUTION INTRAMUSCULAR; INTRAVENOUS at 21:22

## 2017-05-29 RX ADMIN — PIPERACILLIN SODIUM AND TAZOBACTAM SODIUM 4.5 G: 4; .5 INJECTION, POWDER, FOR SOLUTION INTRAVENOUS at 09:51

## 2017-05-29 RX ADMIN — PREGABALIN 75 MG: 75 CAPSULE ORAL at 08:32

## 2017-05-29 RX ADMIN — PREGABALIN 75 MG: 75 CAPSULE ORAL at 21:23

## 2017-05-29 RX ADMIN — METHYLPREDNISOLONE SODIUM SUCCINATE 62.5 MG: 125 INJECTION, POWDER, FOR SOLUTION INTRAMUSCULAR; INTRAVENOUS at 05:05

## 2017-05-29 RX ADMIN — Medication 500 MG: at 17:10

## 2017-05-29 RX ADMIN — CARBAMAZEPINE 200 MG: 100 SUSPENSION ORAL at 08:31

## 2017-05-29 ASSESSMENT — PAIN SCALES - GENERAL
PAINLEVEL_OUTOF10: 10
PAINLEVEL_OUTOF10: 0
PAINLEVEL_OUTOF10: 0
PAINLEVEL_OUTOF10: 2
PAINLEVEL_OUTOF10: 0

## 2017-05-29 NOTE — CARE PLAN
Problem: Nutritional:  Goal: Nutrition support tolerated and meeting greater than 85% of estimated needs  Outcome: MET Date Met:  05/29/17

## 2017-05-29 NOTE — PROGRESS NOTES
Neurology Progress Note               Author: VENUS Guerrero Date & Time created: 2017  8:59 AM     Interval History:  Still has a dull L temporal headache.  Had chest pain last night, cardiac w/u was negative, improved with d/c of bipap.  Path on temporal artery biopsy is still pending.    Review of Systems:  ROS    Physical Exam:  Physical Exam   Neurological:   Alert, still has L ptosis.  Motor is =.  No L temporal tenderness.  Mild epigastric tenderness.       Labs:        Invalid input(s): [id number]  Recent Labs      17   TROPONINI  <0.01     Recent Labs      175  17   SODIUM  134*  138   --    POTASSIUM  3.9  3.6   --    CHLORIDE  105  101   --    CO2  22  25   --    BUN  18  31*   --    CREATININE  0.85  0.97   --    MAGNESIUM   --    --   2.2   CALCIUM  8.7  8.5   --      Recent Labs      17   GLUCOSE  154*  176*     Recent Labs      17   RBC  3.06*  3.61*   HEMOGLOBIN  9.9*  11.5*   HEMATOCRIT  29.2*  34.3*   PLATELETCT  258  298     Recent Labs      17   WBC  16.1*  19.8*   NEUTSPOLYS  94.80*  90.80*   LYMPHOCYTES  1.70*  6.70*   MONOCYTES  0.00  1.70   EOSINOPHILS  0.00  0.00   BASOPHILS  0.00  0.00     Recent Labs      17   SODIUM  134*  138   POTASSIUM  3.9  3.6   CHLORIDE  105  101   CO2  22  25   GLUCOSE  154*  176*   BUN  18  31*   CREATININE  0.85  0.97   CALCIUM  8.7  8.5     Hemodynamics:  Temp (24hrs), Av °C (98.6 °F), Min:36.8 °C (98.3 °F), Max:37.2 °C (98.9 °F)  Temperature: 37 °C (98.6 °F)  Pulse  Av.9  Min: 50  Max: 94Heart Rate (Monitored): (!) 57  NIBP: 122/64 mmHg     Respiratory:    Respiration: (!) 28, Pulse Oximetry: 96 %, O2 Daily Delivery Respiratory : Silicone Nasal Cannula     Work Of Breathing / Effort: Mild  RUL Breath Sounds: Clear, RML Breath Sounds: Diminished, RLL Breath Sounds:  Diminished, JOCELYNE Breath Sounds: Clear, LLL Breath Sounds: Diminished  Fluids:    Intake/Output Summary (Last 24 hours) at 05/29/17 0859  Last data filed at 05/29/17 0600   Gross per 24 hour   Intake   2475 ml   Output   2425 ml   Net     50 ml     Weight: 81.5 kg (179 lb 10.8 oz)  GI/Nutrition:  Orders Placed This Encounter   Procedures   • DIET ORDER     Standing Status: Standing      Number of Occurrences: 1      Standing Expiration Date:      Order Specific Question:  Diet:     Answer:  Clear Liquid [10]     Medical Decision Making, by Problem:  Active Hospital Problems    Diagnosis   • *Unilateral neuralgiform headache [R51]   • Pneumonia likely 2/2 aspiration [J18.9]   • Multiple sclerosis (CMS-HCC) [G35]   • Neuropathy of both feet [G57.93]   • Hyperlipidemia [E78.5]   • Depression [F32.9]   • History of cardiac arrhythmia [Z86.79]   • Hyperglycemia [R73.9]   • Temporal arteritis (CMS-HCC) [M31.6]   • Respiratory failure, acute (CMS-HCC) [J96.00]   • Multiple sclerosis exacerbation (CMS-HCC) [G35]       Plan:  Temporal arteritis vs tic.  Path still pending.  Had chest pain last night, only mild epigastric tenderness.  Will increase carbamazepine to 200mg tid, decrease methylprednisilone to 62.5 mg q 12 hrs.    Core Measures

## 2017-05-29 NOTE — PROGRESS NOTES
Pulmonary Critical Care Progress Note      Date of service: 5/29/2017    Chief Complaint: Multiple sclerosis flare up.     History of Present Illness: I was kindly asked by Dr. Ivan to see and evaluate this lady regarding the above problems.  This is a 56-year-old lady who was admitted to [facility] on or about 05/22/2017.  She presented with an elevated Westergren sed rate, headache, nausea and vomiting.  She was felt to possibly have temporal arteritis.  She has a  history of multiple sclerosis.  She was started on high dose methylprednisolone.  She was seen by neurology.  The plan is for her to have a temporal artery biopsy tomorrow.  Today, I was called because of tachypnea, fever and increasing oxygen requirements.  She has now been transferred to the intensive care unit.      ROS:  Respiratory: positive cough, negative shortness of breath and negative sputum production, Cardiac: negative chest pain, GI: negative abdominal pain.  All other systems negative.      Interval Events:  24 hour interval history reviewed   Pt had episode of severe CP this morning with negative cardiac workup   - good response with toradol  Excellent UOP with gentle diuresis  Edge of bed with 1-person assist  CXR: improved left pleural effusion, but RLL consolidation persists.    Yesterday's Events:  Tmax 99.7, WBC slightly elevated. No bands.   -1.4L over the last 24 hours, +5.7L since admit.  CXR shows: improved dense right lower lobe consolidation with air bronchogram. Ongoing multilobar infiltrate predominant lower lung fields bilaterally.    Off BiPAP. Currently on 4L NC.   One complaint of pain at 6:00 AM   SR, -115 systolic.   Passed swallow evaluation.   900 cc from holt overnight.   Mobile to bedside commode.       PFSH:  No change.      Physical Exam  General: Conversing and asking appropriate questions.   Neuro/Psych: Alert and oriented.   HEENT: PERRL.   CVS: Sinus rhythm, regular rate.    Respiratory: Breathing comfortably on NC. Diminished.   Abdomen/: Soft, non-tender.  Extremities: 2-3+ diffuse edema.       Respiratory:     Pulse Oximetry: 96 %  ImagingAvailable data reviewed         Invalid input(s): [id number]    HemoDynamics:  Pulse: (!) 59, Heart Rate (Monitored): (!) 58  NIBP: 122/67 mmHg    Imaging: Available data reviewed   Echo 5/26 - EF 60%, RVSP 48-50    Recent Labs      05/28/17   2319   TROPONINI  <0.01     Neuro:  GCS Total [place] Coma Score: 15  Imaging: Available data reviewed    Fluids:  Intake/Output       05/27/17 0700 - 05/28/17 0659 05/28/17 0700 - 05/29/17 0659 05/29/17 0700 - 05/30/17 0659      0402-1818 2452-1204 Total 1874-2150 2471-0770 Total 7208-5252 1888-0952 Total       Intake    P.O.  --  0 0  330  700 1030  --  -- --    P.O. -- 0 0  -- -- --    I.V.  200  200 400  200  200 400  --  -- --    IV Piggyback Volume (IV Piggyback) 200 200 400 200 200 400 -- -- --    Other  --  360 360  50  120 170  --  -- --    Medications (P.O./ Enteral Liquids) -- 360 360 50 120 170 -- -- --    Enteral  125  400 525  465  480 945  --  -- --    Enteral Volume 125 400 525 420 420 840 -- -- --    Free Water / Tube Flush -- -- -- 45 60 105 -- -- --    Total Intake  1045 1500 2545 -- -- --       Output    Urine  1805  900 2705  1525  -- 1525  --  -- --    Indwelling Cathether 0963 407 0527 1525 -- 1525 -- -- --    Stool/Urine  --  0 0  0  0 0  --  -- --    Measurable Stool (ml) -- 0 0 0 0 0 -- -- --    Stool  --  -- --  --  -- --  --  -- --    Number of Times Stooled -- 0 x 0 x 0 x 2 x 2 x -- -- --    Total Output 1310 395 2353 1525 0 1525 -- -- --       Net I/O     -1480 60 -1420 -480 1500 1020 -- -- --        Weight: 81.5 kg (179 lb 10.8 oz)  Recent Labs      05/27/17 0228  05/28/17 0455  05/28/17   0369   SODIUM  134*  138   --    POTASSIUM  3.9  3.6   --    CHLORIDE  105  101   --    CO2  22  25   --    BUN  18  31*   --    CREATININE  0.85  0.97   --     MAGNESIUM   --    --   2.2   CALCIUM  8.7  8.5   --      GI/Nutrition:  Imaging: Available data reviewed  taking PO     Liver Function:  Recent Labs      17   GLUCOSE  154*  176*     Heme:  Recent Labs      17   RBC  3.06*  3.61*   HEMOGLOBIN  9.9*  11.5*   HEMATOCRIT  29.2*  34.3*   PLATELETCT  258  298     Infectious Disease:  Temp  Av.9 °C (98.5 °F)  Min: 36.8 °C (98.2 °F)  Max: 37.2 °C (98.9 °F)  Micro: reviewed     Abx:  Doxycycline   Vancomycin stopped   Zosyn    bcx  ngtd  bcx  ngtd  MRSA negative  Flu (-)       Recent Labs      17   WBC  16.1*  19.8*   NEUTSPOLYS  94.80*  90.80*   LYMPHOCYTES  1.70*  6.70*   MONOCYTES  0.00  1.70   EOSINOPHILS  0.00  0.00   BASOPHILS  0.00  0.00     Current Facility-Administered Medications   Medication Dose Frequency Provider Last Rate Last Dose   • potassium chloride (KLOR-CON) 20 MEQ packet 40 mEq  40 mEq DAILY Vikash Warren M.D.   40 mEq at 17 0855   • ketorolac (TORADOL) injection 30 mg  30 mg Q6HRS PRN Jonny Laws M.D.   30 mg at 17 3396   • HYDROmorphone (DILAUDID) injection 0.5-2 mg  0.5-2 mg Q3HRS PRN Jonny Laws M.D.       • furosemide (LASIX) injection 20 mg  20 mg Q8HRS Vitaliy Chavarria M.D.   20 mg at 17 0505   • Dimethyl fumarate (TECFIDERA) 240 mg Capsules  240 mg BID VENUS Guerrero M.D.   240 mg at 17 2100   • methylPREDNISolone sod succ (SOLU-MEDROL) 125 MG injection 62.5 mg  62.5 mg Q8HRS Jinny MORALES M.D.   62.5 mg at 17 0505   • calcium carbonate (OS-JAYESH 500) tablet 500 mg  500 mg BID WITH MEALS Jinny MORALES M.D.   500 mg at 17 1703   • oxycodone immediate-release (ROXICODONE) tablet 5 mg  5 mg Q6HRS PRN Vikash Warren M.D.   5 mg at 17 1431   • carbamazepine (TEGRETOL) 100 MG/5ML suspension 200 mg  200 mg BID Navin Cosme M.D.   200 mg at 17   • doxycycline  monohydrate (ADOXA) tablet 100 mg  100 mg Q12HRS Jonny Laws M.D.   100 mg at 05/28/17 2031   • acetaminophen (TYLENOL) tablet 650 mg  650 mg Q4HRS PRN Vikash Warren M.D.   650 mg at 05/27/17 0605   • albuterol (PROVENTIL) 2.5mg/0.5ml nebulizer solution 2.5 mg  2.5 mg Q4H PRN (RT) Navin Cosme M.D.   2.5 mg at 05/25/17 1358   • albuterol inhaler 2 Puff  2 Puff Q2HRS PRN Navin Cosme M.D.   2 Puff at 05/25/17 1946   • acetaminophen (TYLENOL) suppository 650 mg  650 mg Q6HRS PRN Tommy García M.D.   Stopped at 05/26/17 1135   • piperacillin-tazobactam (ZOSYN) 4.5 g in  mL IVPB  4.5 g Q6HRS Jonny Laws M.D.   Stopped at 05/29/17 0535   • insulin lispro (HUMALOG) injection 2-9 Units  2-9 Units 4X/DAY SILVANO Em M.D.   3 Units at 05/28/17 2036   • senna-docusate (PERICOLACE or SENOKOT S) 8.6-50 MG per tablet 2 Tab  2 Tab BID Ree Frazier M.D.   2 Tab at 05/28/17 2031    And   • polyethylene glycol/lytes (MIRALAX) PACKET 1 Packet  1 Packet QDAY PRN Ree Frazier M.D.   Stopped at 05/26/17 0903    And   • magnesium hydroxide (MILK OF MAGNESIA) suspension 30 mL  30 mL QDAY PRN Ree Frazier M.D.   30 mL at 05/26/17 0910    And   • bisacodyl (DULCOLAX) suppository 10 mg  10 mg QDAY PRN Ree Frazier M.D.   10 mg at 05/26/17 2141   • Respiratory Care per Protocol   Continuous RT Ree Frazier M.D.       • enoxaparin (LOVENOX) inj 40 mg  40 mg DAILY Ree Frazier M.D.   40 mg at 05/28/17 0855   • pregabalin (LYRICA) capsule 75 mg  75 mg BID Ree Frazier M.D.   75 mg at 05/28/17 2031   • sertraline (ZOLOFT) tablet 50 mg  50 mg QHS Ree Frazier M.D.   50 mg at 05/28/17 2031   • tizanidine (ZANAFLEX) tablet 2 mg  2 mg BID Ree Frazier M.D.   2 mg at 05/28/17 2032   • oxybutynin SR (DITROPAN-XL) tablet 10 mg  10 mg Q EVENING Ree Frazier M.D.   10 mg at 05/28/17 2032   • ondansetron (ZOFRAN) syringe/vial injection 4 mg  4 mg Q4HRS PRN Ree Frazier M.D.   4 mg at  05/28/17 1437   • ondansetron (ZOFRAN ODT) dispertab 4 mg  4 mg Q4HRS PRN Ree Frazier M.D.       • promethazine (PHENERGAN) tablet 12.5-25 mg  12.5-25 mg Q4HRS PRN Ree Frazier M.D.       • promethazine (PHENERGAN) suppository 12.5-25 mg  12.5-25 mg Q4HRS PRN Ree Frazier M.D.       • prochlorperazine (COMPAZINE) injection 5-10 mg  5-10 mg Q4HRS PRN Ree Frazier M.D.   10 mg at 05/23/17 0557   • pravastatin (PRAVACHOL) tablet 40 mg  40 mg Nightly Ree Frazier M.D.   40 mg at 05/28/17 2031   • metoprolol (LOPRESSOR) tablet 25 mg  25 mg TWICE DAILY Ree Frazier M.D.   25 mg at 05/28/17 2031     Last reviewed on 5/26/2017  5:15 PM by Marisa Rucker R.N.    Quality  Measures:  Labs reviewed, Medications reviewed, Radiology images reviewed and EKG reviewed  Schmitt catheter: Critically Ill - Requiring Accurate Measurement of Urinary Output      DVT Prophylaxis: Enoxaparin (Lovenox)  DVT prophylaxis - mechanical: SCDs  Ulcer prophylaxis: Not indicated        Assessment and Plan:  -  Acute hypoxemic respiratory failure.   - Aspiration/Pna +/- fluid   - HFNC -> bipap ->6L   - unusual to have pulmonary involvement w GCA/TA   - cont rt/02 protocols   - continue trial of gentle diureses    - IS/PEP/IPV  -  Pneumonia.   - empiric broad spectrum abx   - mrsa swab (-), vanco stopped   - f/u cultures  -  Sepsis, pulmonary source.  -  Query temporal arteritis.     - s/p 3 days of solumedrol 500 q12 on 5/25   - s/p bx 5/26 pending result   - esr 140->115   - headache improving   - continue solumedrol 62.5 q8  -  Leukocytosis   - up/down, minimal bands today   - not requiring pressors   - cultures pending   - on empiric abx coverage  -  Multiple sclerosis with query acute flare   - neuro following   - on tecfidera   - on steroids  -  Anemia.  -  History of seizures.    Diuresis today, stop all maintenance.   Lasix q8.     Discussed patient condition and risk of morbidity and/or mortality with RN, RT, Pharmacy, Charge nurse  / hot rounds, Patient and [facility].    The patient remains critically ill.  Critical care time = 32minutes in directly providing and coordinating critical care and extensive data review.  No time overlap and excludes procedures.

## 2017-05-29 NOTE — PROGRESS NOTES
Pt woke from sleep with 10/10 chest pain. Paged MAHESH Ivey, no page back. Paged Dr. Haley, updated on pt. Orders received and implemented.

## 2017-05-29 NOTE — THERAPY
"Speech Language Therapy dysphagia treatment completed.   Functional Status:  Patient was seen for a swallow re-assess this date. Patient was upright in bedside chair, able to follow commands with delay. Patient demonstrated overall generalized weakness. Pt consumed PO trials of ice, thins, NTL and puree via tsp. Patient initially tolerated PO trials, however as trials progressed patient with coughing delayed during all consistencies trailed. Patient demonstrated quick fatigue and poor endurance. At this time recommend continue NPO/Cortrak with pre feeding trials by SLP. Patient will benefit from a FEES prior to PO progression to further assess oropharyngeal swallow function and r/o aspiration. Education provided to patient.   Recommendations: Continue NPO/Cortrak, prfdg, dysphagia tx, FEES indicated prior to PO progression as deemed appropriate.   Plan of Care: Will benefit from Speech Therapy 3 times per week  Post-Acute Therapy: Discharge to a transitional care facility for continued skilled therapy services.    See \"Rehab Therapy-Acute\" Patient Summary Report for complete documentation.     "

## 2017-05-29 NOTE — PROGRESS NOTES
[facility] ICU Progress Note      Admit Date: 5/22/2017  ICU Day: 4    Resident(s): Jinny Arreaga V  Attending: MAHESH DAMON/ Dr. Ness    Date & Time:   5/29/2017   9:47 AM       Patient ID:    Name:             Justa eSgovia     YOB: 1960  Age:                 56 y.o.  female   MRN:               [id number]    Diagnosis:    MS flare  Sepsis secondary to pulmonary source/PNA  Acute hypoxic respiratory failure - improving  Unilateral neuralgiform headache  Possible GCA  Fluid overload  Leukocytosis  PAH  Lactic acidosis  Anemia  Dyslipidemia  Depression  Hx of cardiac arrhythmia  Neuropathy of both feet  Hyperglycemia      HPI:  55 yo female with PMHx of multiple sclerosis, DLD, migraine initially admitted for MS flare vs temporal arteritis vs trigeminal neuralgia. Patient was started on steroids and carbamazepine. Surgery was consulted for temporal artery biopsy. Two days after admission, patient spiked fever. She was started on ceftriaxone, doxycycline. Rapid response was called today evening due to worsening respiratory status, sepsis with fever and WBC elevation,  and she was then transferred to ICU 5/26.  Started on vanco, zosyn, sepsis protocol initiated; on HFNC; FULL code.     Consultants:    PMA:   Neurology    Interval Events:    Patient had an episode of chest pain overnight. Trop: <0.01, EKG: no acute ST/T wave changes  Patient received Toradol with relief of pain.  Patient states she is feeling better. Breathing better.   Patient continues to have headache  On TF, repeat swallow eval pending    PHYSICAL EXAM  Gen: NAD  HEENT: NC/AT, no scleral icterus, no conjunctival injection. NG in place  Neck: Supple, no lymphadenopathy.  Cardiac: S1S2, RRR, no m/r/g, no JVD.  Respiratory: Decreased breath sounds biltaeral  Abdomen: BS+, soft, ND, no rebound/guarding, no palpable organomegaly.   Ext: No edema, 2+ DP pulses b/l.  Skin: Warm, dry. No rashes or erythema.   Neuro: AAOx4,  no focal sensory  or motor deficits. ? Left ptosis  Psych: Affect, mood, judgement normal.    Respiratory:     Respiration: (!) 28, Pulse Oximetry: 96 %, O2 Daily Delivery Respiratory : Silicone Nasal Cannula    Chest Tube Drains:          Invalid input(s): [id number]    HemoDynamics:  Pulse: (!) 57, Heart Rate (Monitored): (!) 57 NIBP: 122/64 mmHg      Neuro:      Fluids:        Intake/Output Summary (Last 24 hours) at 17 0947  Last data filed at 17 0600   Gross per 24 hour   Intake   2430 ml   Output   2425 ml   Net      5 ml       Weight: 81.5 kg (179 lb 10.8 oz)  Body mass index is 32.85 kg/(m^2).    Recent Labs      17   2319   SODIUM  134*  138   --    POTASSIUM  3.9  3.6   --    CHLORIDE  105  101   --    CO2  22  25   --    BUN  18  31*   --    CREATININE  0.85  0.97   --    MAGNESIUM   --    --   2.2   CALCIUM  8.7  8.5   --        GI/Nutrition:  Recent Labs      17   0455   GLUCOSE  154*  176*       Heme:  Recent Labs      17   0455   RBC  3.06*  3.61*   HEMOGLOBIN  9.9*  11.5*   HEMATOCRIT  29.2*  34.3*   PLATELETCT  258  298       Infectious Disease:  Temp  Av °C (98.6 °F)  Min: 36.8 °C (98.3 °F)  Max: 37.2 °C (98.9 °F)  Recent Labs      175   WBC  16.1*  19.8*   NEUTSPOLYS  94.80*  90.80*   LYMPHOCYTES  1.70*  6.70*   MONOCYTES  0.00  1.70   EOSINOPHILS  0.00  0.00   BASOPHILS  0.00  0.00       Meds:  • carbamazepine  200 mg     • methylPREDNISolone  62.5 mg     • potassium chloride  40 mEq     • ketorolac  30 mg     • hydromorphone  0.5-2 mg     • furosemide  20 mg     • Non Formulary Request  240 mg     • calcium carbonate  500 mg     • oxycodone immediate-release  5 mg     • doxycycline monohydrate  100 mg     • acetaminophen  650 mg     • albuterol  2.5 mg     • albuterol  2 Puff     • acetaminophen (TYLENOL) suppository  650 mg     • piperacillin-tazobactam  4.5 g Stopped (17  0535)   • insulin lispro  2-9 Units     • senna-docusate  2 Tab      And   • polyethylene glycol/lytes  1 Packet      And   • magnesium hydroxide  30 mL      And   • bisacodyl  10 mg     • Respiratory Care per Protocol       • enoxaparin  40 mg     • pregabalin  75 mg     • sertraline  50 mg     • tizanidine  2 mg     • oxybutynin SR  10 mg     • ondansetron  4 mg     • ondansetron  4 mg     • promethazine  12.5-25 mg     • promethazine  12.5-25 mg     • prochlorperazine  5-10 mg     • pravastatin  40 mg     • metoprolol  25 mg          Problem and Plan:    Sepsis secondary to pulmonary source/PNA  Acute hypoxic respiratory failure - improving  Pneumonia   - SIRS: 3(fever, tachypnea, leukocytosis), lactic acid : 3.8. BP and HR stable  - CXR: bilateral pulmonary infiltrates  - Blood cx 17: NGTD. AB.45/26/58.5; on HFNC  - influenza PCR NGT;   sputum C&S pending;  UA: mild protein, WBC 2-5;    - DDx also include vasculitis, pulmonary hemorrhage, aspiration PNA  - was on ceftriaxone and doxy started  to  , started on sepsis protocol, vancomycin, zosyn, cont doxycycline   Plan:  - cont zosyn, (mrsa swab (-), vanco stopped) doxycycline PO;   - solumedrol 62.5 q8h, changed to BID   - cotn lasix 20 mg IV TID for pulmonary congestion/fluid overload. Lab work pending  - Blood cx: NGTD (repeat Blood cx  due to continuous fever)  - bronchoscopy if developed respiratory distress   - supplemental oxygen: currently on 4 L NC; BiPAP prn   -Echo : moderate PAH (RVSP 50mm), EF 60%, possible fluid overload  - patient failed swallow eval, on TF. Repeat swallow evaluation pending    Unilateral neuralgiform headache  Possible GCA  - New onset, localized primarily to LEFT fronto-temporal region, mod-severe, sharp, intermittent  - Presentation on admission was worrisome for temporal arteritis vs other neurogenic etiology  - Initial work-up:  (140 at [facility]), CRP 48    - CT Head: No acute  intracranial abnormality  - MRI C/L-spine/brain: Evidence chronic demyelinating plaques of MS  - Neuro (Dr. Cosme) following >> cannot exclude Temporal Arteritis, but also continued current treatment for presumed Trigeminal Neuralgia.    - on Carbamazepine 200mg BID for possible trigeminal neuralgia. Increased to TID by neurology  - temporal artery biopsy pending; could be falsely NGT due to pretreatment with steroids  - solumedrol 62.5 q8h changed to BID by neurology    Fluid overload  -non collapsable IVC of Echo, leg puffines; +5 L since adm  -etiology include steroids vs preexisting PAH and RV disfunction  -continue lasix 20 mg IV TID, CTM I/O; KCl suppl    PAH  -question preexisitng PAH vs acute secondary to acute lung process  -cont current mgm as above    Leukocytosis  - probably secondary to infection, PNA and steroids    - on AB as above; f/u cx  - continue current mgm    Elevated lactic acid  -likely due to sepsis  -trended down    Elevated liver enzymes, mild  - patient denies abdominal symptoms  - closely monitor as patient is on carbamazepine  - repeat CMP pending    ?MS flare  Multiple sclerosis   - Diagnosed 34 years ago and followed by [facility] (Dr. Asmita Pollard)  - Has had hx of flare requiring high dose IV Steroids x5 days, chronic urinary incontinence  - Neuro consulted; received IV Solumedrol 500mg BID, Tecfidera  - on Oxybutynin for incontinence; Schmitt in for critical condition  - neurology on board      Anemia, normocytic  -Iron studies pending    Hyperlipidemia  - Cont Pravastatin 40mg QD    Depression  - Cont Sertraline 50mg QD    History of cardiac arrhythmia  -  cont home Metoprolol 25mg BID (with holding parameters if HR < 50)    Hyperglycemia  - Likely exacerbated by IV Steroids  - ISS, Hypoglycemic protocol, POC     Neuropathy of both feet  - Cont Lyrica 75mg BID, Tizanidine 2mg BID    DISPO: ICU    CODE STATUS: Full code    Quality Measures:  Schmitt Catheter:  DVT Prophylaxis:  Lovenox  Ulcer Prophylaxis:  Antibiotics: zosyn  Lines:    Procedures:    Temporal artery biospy: 5/26    Imaging:    DX-CHEST-LIMITED (1 VIEW)   Final Result      Perihilar and bibasilar opacities are not significantly changed compared to prior.      Layering pleural effusions are not excluded.      DX-CHEST-PORTABLE (1 VIEW)   Final Result      Persistent bilateral midlung and lower lobe areas of edema or pneumonia with slight worsening in the left lower lobe.      DX-CHEST-PORTABLE (1 VIEW)   Final Result      1.  Stable bilateral mid and lower lung airspace opacities consistent with edema and/or infection.   2.  Small right pleural effusion.   3.  Interval placement of an enteric feeding tube which courses towards the stomach with the distal tip not visualized.      ECHOCARDIOGRAM COMP W/O CONT   Final Result      DX-ABDOMEN FOR TUBE PLACEMENT   Final Result      Feeding tube tip projects over the expected location the gastric antrum.      DX-CHEST-PORTABLE (1 VIEW)   Final Result      Increasing bilateral pulmonary consolidation.      DX-CHEST-LIMITED (1 VIEW)   Final Result      1.  Patchy bilateral interstitial opacities are suggestive of atypical infection. Findings have worsened from the prior exam.      2.  Questionable small bilateral pleural effusions.         MR-LUMBAR SPINE-WITH & W/O   Final Result      1.  There are tiny focal intramedullary T2 signal intensities in the lower end of the thoracic spinal cord likely representing chronic demyelinating plaques of multiple sclerosis.   2.  Mild chronic compression deformity at L1.      MR-CERVICAL SPINE-WITH & W/O   Final Result      1.  Abnormal intramedullary T2 signal intensity in the cervical spinal cord likely representing chronic demyelinating plaques of multiple sclerosis. None of the lesions demonstrates contrast enhancement.   2.  Minimal degenerative disease as described above.      MR-BRAIN-WITH & W/O   Final Result      1.  Multifocal abnormal  T2 hyperintensities in the subcortical and periventricular white matter. None of the lesions demonstrates contrast enhancement. In view of history of multiple sclerosis this findings likely represents chronic demyelinating plaques    of multiple sclerosis.   2.  Mild cerebral atrophy.      DX-CHEST-PORTABLE (1 VIEW)   Final Result      1.  Medial RIGHT lung base and greater atelectasis.   2.  No pleural fluid or pneumothorax.      CT-HEAD W/O   Final Result      No acute intracranial abnormality.

## 2017-05-29 NOTE — CARE PLAN
Problem: Safety  Goal: Will remain free from injury  Transferred pt with one person assist, at this time pt still free from injury

## 2017-05-29 NOTE — CARE PLAN
Problem: Infection  Goal: Will remain free from infection  Labs noted no new infection in patient at this time

## 2017-05-29 NOTE — CARE PLAN
Problem: Pain Management  Goal: Pain level will decrease to patient’s comfort goal  Intervention: Follow pain managment plan developed in collaboration with patient and Interdisciplinary Team  Assess pain every 2 hours. Implement nursing interventions to decrease pt pain to comfort goal.

## 2017-05-30 ENCOUNTER — APPOINTMENT (OUTPATIENT)
Dept: RADIOLOGY | Facility: MEDICAL CENTER | Age: 57
DRG: 040 | End: 2017-05-30
Attending: HOSPITALIST
Payer: MEDICARE

## 2017-05-30 LAB
ALBUMIN SERPL BCP-MCNC: 2.1 G/DL (ref 3.2–4.9)
ALP SERPL-CCNC: 112 U/L (ref 30–99)
ALT SERPL-CCNC: 173 U/L (ref 2–50)
ANION GAP SERPL CALC-SCNC: 9 MMOL/L (ref 0–11.9)
AST SERPL-CCNC: 53 U/L (ref 12–45)
BASOPHILS # BLD AUTO: 0.4 % (ref 0–1.8)
BASOPHILS # BLD: 0.04 K/UL (ref 0–0.12)
BILIRUB CONJ SERPL-MCNC: 0.1 MG/DL (ref 0.1–0.5)
BILIRUB INDIRECT SERPL-MCNC: 0.4 MG/DL (ref 0–1)
BILIRUB SERPL-MCNC: 0.5 MG/DL (ref 0.1–1.5)
BUN SERPL-MCNC: 26 MG/DL (ref 8–22)
CALCIUM SERPL-MCNC: 8.2 MG/DL (ref 8.5–10.5)
CHLORIDE SERPL-SCNC: 99 MMOL/L (ref 96–112)
CO2 SERPL-SCNC: 31 MMOL/L (ref 20–33)
CREAT SERPL-MCNC: 0.76 MG/DL (ref 0.5–1.4)
EOSINOPHIL # BLD AUTO: 0.08 K/UL (ref 0–0.51)
EOSINOPHIL NFR BLD: 0.7 % (ref 0–6.9)
ERYTHROCYTE [DISTWIDTH] IN BLOOD BY AUTOMATED COUNT: 46.5 FL (ref 35.9–50)
ERYTHROCYTE [SEDIMENTATION RATE] IN BLOOD BY WESTERGREN METHOD: 118 MM/HOUR (ref 0–30)
GFR SERPL CREATININE-BSD FRML MDRD: >60 ML/MIN/1.73 M 2
GLUCOSE BLD-MCNC: 114 MG/DL (ref 65–99)
GLUCOSE BLD-MCNC: 118 MG/DL (ref 65–99)
GLUCOSE BLD-MCNC: 123 MG/DL (ref 65–99)
GLUCOSE BLD-MCNC: 129 MG/DL (ref 65–99)
GLUCOSE BLD-MCNC: 97 MG/DL (ref 65–99)
GLUCOSE SERPL-MCNC: 139 MG/DL (ref 65–99)
HCT VFR BLD AUTO: 31.6 % (ref 37–47)
HGB BLD-MCNC: 10.6 G/DL (ref 12–16)
IMM GRANULOCYTES # BLD AUTO: 0.48 K/UL (ref 0–0.11)
IMM GRANULOCYTES NFR BLD AUTO: 4.3 % (ref 0–0.9)
LYMPHOCYTES # BLD AUTO: 0.59 K/UL (ref 1–4.8)
LYMPHOCYTES NFR BLD: 5.2 % (ref 22–41)
MCH RBC QN AUTO: 31.5 PG (ref 27–33)
MCHC RBC AUTO-ENTMCNC: 33.5 G/DL (ref 33.6–35)
MCV RBC AUTO: 94 FL (ref 81.4–97.8)
MONOCYTES # BLD AUTO: 0.18 K/UL (ref 0–0.85)
MONOCYTES NFR BLD AUTO: 1.6 % (ref 0–13.4)
NEUTROPHILS # BLD AUTO: 9.91 K/UL (ref 2–7.15)
NEUTROPHILS NFR BLD: 87.8 % (ref 44–72)
NRBC # BLD AUTO: 0 K/UL
NRBC BLD AUTO-RTO: 0 /100 WBC
PLATELET # BLD AUTO: 328 K/UL (ref 164–446)
PMV BLD AUTO: 10.7 FL (ref 9–12.9)
POTASSIUM SERPL-SCNC: 3.6 MMOL/L (ref 3.6–5.5)
PROT SERPL-MCNC: 5.1 G/DL (ref 6–8.2)
RBC # BLD AUTO: 3.36 M/UL (ref 4.2–5.4)
SODIUM SERPL-SCNC: 139 MMOL/L (ref 135–145)
WBC # BLD AUTO: 11.3 K/UL (ref 4.8–10.8)

## 2017-05-30 PROCEDURE — 80076 HEPATIC FUNCTION PANEL: CPT

## 2017-05-30 PROCEDURE — 92526 ORAL FUNCTION THERAPY: CPT

## 2017-05-30 PROCEDURE — 700105 HCHG RX REV CODE 258: Performed by: STUDENT IN AN ORGANIZED HEALTH CARE EDUCATION/TRAINING PROGRAM

## 2017-05-30 PROCEDURE — 85652 RBC SED RATE AUTOMATED: CPT

## 2017-05-30 PROCEDURE — 700111 HCHG RX REV CODE 636 W/ 250 OVERRIDE (IP): Performed by: SPECIALIST

## 2017-05-30 PROCEDURE — 82962 GLUCOSE BLOOD TEST: CPT

## 2017-05-30 PROCEDURE — 700102 HCHG RX REV CODE 250 W/ 637 OVERRIDE(OP): Performed by: INTERNAL MEDICINE

## 2017-05-30 PROCEDURE — 76700 US EXAM ABDOM COMPLETE: CPT

## 2017-05-30 PROCEDURE — 770006 HCHG ROOM/CARE - MED/SURG/GYN SEMI*

## 2017-05-30 PROCEDURE — A9270 NON-COVERED ITEM OR SERVICE: HCPCS | Performed by: STUDENT IN AN ORGANIZED HEALTH CARE EDUCATION/TRAINING PROGRAM

## 2017-05-30 PROCEDURE — 700111 HCHG RX REV CODE 636 W/ 250 OVERRIDE (IP): Performed by: STUDENT IN AN ORGANIZED HEALTH CARE EDUCATION/TRAINING PROGRAM

## 2017-05-30 PROCEDURE — 700111 HCHG RX REV CODE 636 W/ 250 OVERRIDE (IP): Performed by: INTERNAL MEDICINE

## 2017-05-30 PROCEDURE — A9270 NON-COVERED ITEM OR SERVICE: HCPCS | Performed by: INTERNAL MEDICINE

## 2017-05-30 PROCEDURE — 700102 HCHG RX REV CODE 250 W/ 637 OVERRIDE(OP): Performed by: STUDENT IN AN ORGANIZED HEALTH CARE EDUCATION/TRAINING PROGRAM

## 2017-05-30 PROCEDURE — A9270 NON-COVERED ITEM OR SERVICE: HCPCS | Performed by: SPECIALIST

## 2017-05-30 PROCEDURE — 85025 COMPLETE CBC W/AUTO DIFF WBC: CPT

## 2017-05-30 PROCEDURE — 80048 BASIC METABOLIC PNL TOTAL CA: CPT

## 2017-05-30 PROCEDURE — 700102 HCHG RX REV CODE 250 W/ 637 OVERRIDE(OP): Performed by: SPECIALIST

## 2017-05-30 PROCEDURE — A9270 NON-COVERED ITEM OR SERVICE: HCPCS | Performed by: HOSPITALIST

## 2017-05-30 PROCEDURE — 700102 HCHG RX REV CODE 250 W/ 637 OVERRIDE(OP): Performed by: HOSPITALIST

## 2017-05-30 PROCEDURE — 99232 SBSQ HOSP IP/OBS MODERATE 35: CPT | Performed by: INTERNAL MEDICINE

## 2017-05-30 PROCEDURE — 700105 HCHG RX REV CODE 258: Performed by: INTERNAL MEDICINE

## 2017-05-30 RX ORDER — FUROSEMIDE 10 MG/ML
20 INJECTION INTRAMUSCULAR; INTRAVENOUS
Status: DISCONTINUED | OUTPATIENT
Start: 2017-05-30 | End: 2017-06-02 | Stop reason: HOSPADM

## 2017-05-30 RX ORDER — CARBAMAZEPINE 200 MG/1
200 TABLET ORAL 3 TIMES DAILY
Status: DISCONTINUED | OUTPATIENT
Start: 2017-05-30 | End: 2017-06-02 | Stop reason: HOSPADM

## 2017-05-30 RX ADMIN — CARBAMAZEPINE 200 MG: 100 SUSPENSION ORAL at 15:10

## 2017-05-30 RX ADMIN — METOPROLOL TARTRATE 25 MG: 25 TABLET, FILM COATED ORAL at 09:10

## 2017-05-30 RX ADMIN — PIPERACILLIN SODIUM AND TAZOBACTAM SODIUM 4.5 G: 4; .5 INJECTION, POWDER, FOR SOLUTION INTRAVENOUS at 03:47

## 2017-05-30 RX ADMIN — DOXYCYCLINE 100 MG: 100 TABLET ORAL at 09:10

## 2017-05-30 RX ADMIN — ACETAMINOPHEN 650 MG: 325 TABLET, FILM COATED ORAL at 15:03

## 2017-05-30 RX ADMIN — SERTRALINE 50 MG: 50 TABLET, FILM COATED ORAL at 21:57

## 2017-05-30 RX ADMIN — PRAVASTATIN SODIUM 40 MG: 20 TABLET ORAL at 21:48

## 2017-05-30 RX ADMIN — FUROSEMIDE 20 MG: 10 INJECTION, SOLUTION INTRAMUSCULAR; INTRAVENOUS at 05:50

## 2017-05-30 RX ADMIN — ENOXAPARIN SODIUM 40 MG: 100 INJECTION SUBCUTANEOUS at 09:10

## 2017-05-30 RX ADMIN — PIPERACILLIN SODIUM AND TAZOBACTAM SODIUM 4.5 G: 4; .5 INJECTION, POWDER, FOR SOLUTION INTRAVENOUS at 09:11

## 2017-05-30 RX ADMIN — PIPERACILLIN SODIUM AND TAZOBACTAM SODIUM 4.5 G: 4; .5 INJECTION, POWDER, FOR SOLUTION INTRAVENOUS at 15:10

## 2017-05-30 RX ADMIN — OXYBUTYNIN CHLORIDE 10 MG: 10 TABLET, FILM COATED, EXTENDED RELEASE ORAL at 21:48

## 2017-05-30 RX ADMIN — CARBAMAZEPINE 200 MG: 100 SUSPENSION ORAL at 09:10

## 2017-05-30 RX ADMIN — METHYLPREDNISOLONE SODIUM SUCCINATE 62.5 MG: 125 INJECTION, POWDER, FOR SOLUTION INTRAMUSCULAR; INTRAVENOUS at 21:57

## 2017-05-30 RX ADMIN — POTASSIUM CHLORIDE 40 MEQ: 1.5 POWDER, FOR SOLUTION ORAL at 09:10

## 2017-05-30 RX ADMIN — ONDANSETRON 4 MG: 2 INJECTION INTRAMUSCULAR; INTRAVENOUS at 17:31

## 2017-05-30 RX ADMIN — PIPERACILLIN SODIUM AND TAZOBACTAM SODIUM 4.5 G: 4; .5 INJECTION, POWDER, FOR SOLUTION INTRAVENOUS at 21:48

## 2017-05-30 RX ADMIN — PREGABALIN 75 MG: 75 CAPSULE ORAL at 21:57

## 2017-05-30 RX ADMIN — PREGABALIN 75 MG: 75 CAPSULE ORAL at 09:10

## 2017-05-30 RX ADMIN — ACETAMINOPHEN 650 MG: 325 TABLET, FILM COATED ORAL at 22:17

## 2017-05-30 RX ADMIN — TIZANIDINE 2 MG: 4 TABLET ORAL at 09:10

## 2017-05-30 RX ADMIN — Medication 500 MG: at 18:00

## 2017-05-30 RX ADMIN — CARBAMAZEPINE 200 MG: 100 SUSPENSION ORAL at 21:47

## 2017-05-30 RX ADMIN — FUROSEMIDE 20 MG: 10 INJECTION, SOLUTION INTRAMUSCULAR; INTRAVENOUS at 15:10

## 2017-05-30 RX ADMIN — DOXYCYCLINE 100 MG: 100 TABLET ORAL at 21:57

## 2017-05-30 RX ADMIN — TIZANIDINE 2 MG: 4 TABLET ORAL at 21:48

## 2017-05-30 RX ADMIN — METHYLPREDNISOLONE SODIUM SUCCINATE 62.5 MG: 125 INJECTION, POWDER, FOR SOLUTION INTRAMUSCULAR; INTRAVENOUS at 09:11

## 2017-05-30 ASSESSMENT — PAIN SCALES - GENERAL
PAINLEVEL_OUTOF10: 3
PAINLEVEL_OUTOF10: 0

## 2017-05-30 NOTE — CARE PLAN
Problem: Respiratory:  Goal: Respiratory status will improve  Outcome: PROGRESSING AS EXPECTED  Pt had episode of shortness of breath, aerobika used and pt given ice packs. Stated she just got anxious and hot.     Problem: Mobility  Goal: Risk for activity intolerance will decrease  Outcome: PROGRESSING AS EXPECTED  Pt mobilizes well, standby assist, however when fatigued 2person max assist.

## 2017-05-30 NOTE — PROGRESS NOTES
[facility] ICU Progress Note      Admit Date: 5/22/2017  ICU Day: 6    Resident(s): Nba Samuel  Attending: MAHESH DAMON/ Dr. Ness    Date & Time:   5/30/2017   11:39 AM       Patient ID:    Name:             Justa Segovia     YOB: 1960  Age:                 56 y.o.  female   MRN:               [id number]    Diagnosis:    MS flare  Sepsis secondary to pulmonary source/PNA  Acute hypoxic respiratory failure - improving  Unilateral neuralgiform headache  Possible GCA  Fluid overload  Leukocytosis  PAH  Lactic acidosis  Anemia  Dyslipidemia  Depression  Hx of cardiac arrhythmia  Neuropathy of both feet  Hyperglycemia      HPI:  57 yo female with PMHx of multiple sclerosis, DLD, migraine initially admitted for MS flare vs temporal arteritis vs trigeminal neuralgia. Patient was started on steroids and carbamazepine. Surgery was consulted for temporal artery biopsy. Two days after admission, patient spiked fever. She was started on ceftriaxone, doxycycline. Rapid response was called today evening due to worsening respiratory status, sepsis with fever and WBC elevation,  and she was then transferred to ICU 5/26.  Started on vanco, zosyn, sepsis protocol initiated; on HFNC; FULL code.     Consultants:    PMA:   Neurology    Interval Events:    No acute events overnight, mild hot flashes but no fever  Patient with improved headache today, 1/10 in severity  Temporal artery biopsy results negative for arteritis, could be false positive in the setting of previous treatment prior to biopsy  Speech re-evaluation today    PHYSICAL EXAM  Gen: NAD, pleasant and cooperative  HEENT: NC/AT, no scleral icterus, no conjunctival injection. cortrak in place.  Neck: Supple, no lymphadenopathy.  Cardiac: S1S2, RRR, no m/r/g, no JVD.  Respiratory: Decreased breath sounds biltaeral  Abdomen: BS+, soft, ND, no rebound/guarding, no palpable organomegaly.   Ext: No edema, 2+ DP pulses b/l.  Skin: Warm, dry. No rashes or  erythema.   Neuro: AAOx4,  no focal sensory or motor deficits. Left side partial ptosis  Psych: Affect, mood, judgement normal.    Respiratory:     Respiration: (!) 24, Pulse Oximetry: 92 %, O2 Daily Delivery Respiratory : Silicone Nasal Cannula            Invalid input(s): [id number]    HemoDynamics:  Pulse: 76, Heart Rate (Monitored): 76 NIBP: (!) 97/56 mmHg      Neuro:      Fluids:    Date 05/30/17 0700 - 05/31/17 0659   Shift 4717-2225 4010-8593 8537-0701 24 Hour Total   I  N  T  A  K  E   I.V. 160   160      IV Volume (NS) 60   60      IV Piggyback Volume (IV Piggyback) 100   100    Other 150   150      Medications (P.O./ Enteral Liquids) 150   150    Enteral 190   190      Enteral Volume 70   70      Free Water / Tube Flush 120   120    Shift Total 500   500   O  U  T  P  U  T   Urine 1575   1575      Indwelling Cathether 1575   1575    Stool          Number of Times Stooled 1 x   1 x    Shift Total 1575   1575   NET -1075   -1075        Intake/Output Summary (Last 24 hours) at 05/29/17 0947  Last data filed at 05/29/17 0600   Gross per 24 hour   Intake   2430 ml   Output   2425 ml   Net      5 ml       Weight: 77.2 kg (170 lb 3.1 oz)  Body mass index is 31.12 kg/(m^2).    Recent Labs      05/28/17 0455 05/28/17   2319  05/29/17 1007 05/30/17   0400   SODIUM  138   --   139  139   POTASSIUM  3.6   --   3.9  3.6   CHLORIDE  101   --   102  99   CO2  25   --   27  31   BUN  31*   --   30*  26*   CREATININE  0.97   --   0.76  0.76   MAGNESIUM   --   2.2   --    --    CALCIUM  8.5   --   8.3*  8.2*       GI/Nutrition:  Recent Labs      05/28/17 0455 05/29/17 1007 05/30/17   0400   ALTSGPT   --   255*   --    ASTSGOT   --   129*   --    ALKPHOSPHAT   --   129*   --    TBILIRUBIN   --   0.5   --    GLUCOSE  176*  180*  139*       Heme:  Recent Labs      05/28/17 0455 05/29/17 1007 05/30/17   0400   RBC  3.61*  3.34*  3.36*   HEMOGLOBIN  11.5*  10.6*  10.6*   HEMATOCRIT  34.3*  31.6*  31.6*    PLATELETCT  298  313  328       Infectious Disease:  Temp  Av °C (98.6 °F)  Min: 36.7 °C (98 °F)  Max: 37.2 °C (98.9 °F)  Recent Labs      17   0455  17   1007  17   0400   WBC  19.8*  14.1*  11.3*   NEUTSPOLYS  90.80*  89.90*  87.80*   LYMPHOCYTES  6.70*  3.30*  5.20*   MONOCYTES  1.70  1.80  1.60   EOSINOPHILS  0.00  0.10  0.70   BASOPHILS  0.00  0.40  0.40   ASTSGOT   --   129*   --    ALTSGPT   --   255*   --    ALKPHOSPHAT   --   129*   --    TBILIRUBIN   --   0.5   --        Meds:  • benzocaine-menthol  1 Lozenge     • carbamazepine  200 mg     • methylPREDNISolone  62.5 mg     • potassium chloride  40 mEq     • ketorolac  30 mg     • hydromorphone  0.5-2 mg     • furosemide  20 mg     • Non Formulary Request  240 mg     • calcium carbonate  500 mg     • oxycodone immediate-release  5 mg     • doxycycline monohydrate  100 mg     • acetaminophen  650 mg     • albuterol  2 Puff     • acetaminophen (TYLENOL) suppository  650 mg     • piperacillin-tazobactam  4.5 g Stopped (17 1011)   • insulin lispro  2-9 Units     • senna-docusate  2 Tab      And   • polyethylene glycol/lytes  1 Packet      And   • magnesium hydroxide  30 mL      And   • bisacodyl  10 mg     • Respiratory Care per Protocol       • enoxaparin  40 mg     • pregabalin  75 mg     • sertraline  50 mg     • tizanidine  2 mg     • oxybutynin SR  10 mg     • ondansetron  4 mg     • ondansetron  4 mg     • promethazine  12.5-25 mg     • promethazine  12.5-25 mg     • prochlorperazine  5-10 mg     • pravastatin  40 mg     • metoprolol  25 mg          Problem and Plan:    Sepsis secondary to pulmonary source/PNA  Acute hypoxic respiratory failure - improving  Pneumonia   - possibly secondary to aspiration PNA vs unlikely vasculitis  - initially SIRS: 3(fever, tachypnea, leukocytosis), lactic acid : 3.8. BP and HR stable  - Blood cx 17: NGTD. AB.45//58.5; on HFNC  - influenza PCR NGT;   sputum C&S negative    - Echo  : moderate PAH (RVSP 50mm), EF 60%  - was on ceftriaxone and doxy started 5/24 to 5/25 , started on sepsis protocol, vancomycin, zosyn, cont doxycycline 5/26  - off of BIPAP for 24 hours  Plan:  - cont zosyn, (mrsa swab (-), vanco stopped) doxycycline PO; finishes 7 day course  5/31  - solumedrol 62.5 q8h, changed to BID   - decrease lasix to  20 mg IV BID for pulmonary congestion/fluid overload.   - RT/O2 protocol  - patient failed swallow eval, on TF. Repeat swallow evaluation pending    Unilateral neuralgiform headache  [place]ic [place] vs temporal arteritis  - New onset, localized primarily to LEFT fronto-temporal region, mod-severe, sharp, intermittent  - Presentation on admission was worrisome for temporal arteritis vs other neurogenic etiology  - Initial work-up:  (140 at [facility]), CRP 48    - CT Head: No acute intracranial abnormality  - MRI C/L-spine/brain: Evidence chronic demyelinating plaques of MS  - path negative for arteritis changes but can be falsely negative in the setting of treatment with steroid prior to biopsy  - continued elevation in ESR may be secondary to underlying lung infection  Plan:  - continue carbamezapine at current dose 200 mg TID  - will continue to taper down steroid, methylpred 62.5 q12h today and decrease tomorrow, if worsening headache with taper may point to temporal arteritis being the cause      Fluid overload  -non collapsable IVC of Echo, leg puffines; +2.2 L since adm  -etiology include steroids vs preexisting PAH and RV disfunction  -decrease lasix 20 mg IV BID, CTM I/O  - KCl suppl    PAH  -question preexisitng PAH vs acute secondary to acute lung process  -cont current mgm as above      Elevated lactic acid  -likely due to sepsis  -trended down    Elevated liver enzymes, mild  - patient denies abdominal symptoms  - closely monitor as patient is on carbamazepine  - US abdomen without acute abnormality  Plan:  - repeat CMP pending    ?MS  flare  Multiple sclerosis   - Diagnosed 34 years ago and followed by [facility] (Dr. Asmita Pollard)  - Has had hx of flare requiring high dose IV Steroids x5 days, chronic urinary incontinence  - Neuro consulted; received IV Solumedrol 500mg BID, Tecfidera  - on Oxybutynin for incontinence; Schmitt in for critical condition  - neurology on board      Anemia, normocytic  -Iron studies pending    Hyperlipidemia  - Cont Pravastatin 40mg QD    Depression  - Cont Sertraline 50mg QD    History of cardiac arrhythmia  -  cont home Metoprolol 25mg BID (with holding parameters if HR < 50)    Hyperglycemia  - Likely exacerbated by IV Steroids  - ISS, Hypoglycemic protocol, POC     Neuropathy of both feet  - Cont Lyrica 75mg BID, Tizanidine 2mg BID    DISPO: Patient off BIPAP for 24 hours, ok for transfer to medical floor      CODE STATUS: Full code    Quality Measures:  Schmitt Catheter:yes  DVT Prophylaxis: Lovenox  Ulcer Prophylaxis:  Antibiotics: zosyn      Procedures:    Temporal artery biospy: 5/26    Imaging:    US-ABDOMEN COMPLETE SURVEY   Final Result      No gallstones or dilatation of the biliary tree.   Mild splenomegaly.   No free fluid.      DX-CHEST-LIMITED (1 VIEW)   Final Result      Perihilar and bibasilar opacities are not significantly changed compared to prior.      Layering pleural effusions are not excluded.      DX-CHEST-PORTABLE (1 VIEW)   Final Result      Persistent bilateral midlung and lower lobe areas of edema or pneumonia with slight worsening in the left lower lobe.      DX-CHEST-PORTABLE (1 VIEW)   Final Result      1.  Stable bilateral mid and lower lung airspace opacities consistent with edema and/or infection.   2.  Small right pleural effusion.   3.  Interval placement of an enteric feeding tube which courses towards the stomach with the distal tip not visualized.      ECHOCARDIOGRAM COMP W/O CONT   Final Result      DX-ABDOMEN FOR TUBE PLACEMENT   Final Result      Feeding tube tip  projects over the expected location the gastric antrum.      DX-CHEST-PORTABLE (1 VIEW)   Final Result      Increasing bilateral pulmonary consolidation.      DX-CHEST-LIMITED (1 VIEW)   Final Result      1.  Patchy bilateral interstitial opacities are suggestive of atypical infection. Findings have worsened from the prior exam.      2.  Questionable small bilateral pleural effusions.         MR-LUMBAR SPINE-WITH & W/O   Final Result      1.  There are tiny focal intramedullary T2 signal intensities in the lower end of the thoracic spinal cord likely representing chronic demyelinating plaques of multiple sclerosis.   2.  Mild chronic compression deformity at L1.      MR-CERVICAL SPINE-WITH & W/O   Final Result      1.  Abnormal intramedullary T2 signal intensity in the cervical spinal cord likely representing chronic demyelinating plaques of multiple sclerosis. None of the lesions demonstrates contrast enhancement.   2.  Minimal degenerative disease as described above.      MR-BRAIN-WITH & W/O   Final Result      1.  Multifocal abnormal T2 hyperintensities in the subcortical and periventricular white matter. None of the lesions demonstrates contrast enhancement. In view of history of multiple sclerosis this findings likely represents chronic demyelinating plaques    of multiple sclerosis.   2.  Mild cerebral atrophy.      DX-CHEST-PORTABLE (1 VIEW)   Final Result      1.  Medial RIGHT lung base and greater atelectasis.   2.  No pleural fluid or pneumothorax.      CT-HEAD W/O   Final Result      No acute intracranial abnormality.

## 2017-05-30 NOTE — CARE PLAN
Problem: Venous Thromboembolism (VTW)/Deep Vein Thrombosis (DVT) Prevention:  Goal: Patient will participate in Venous Thrombosis (VTE)/Deep Vein Thrombosis (DVT)Prevention Measures  Intervention: Ensure patient wears graduated elastic stockings (ROSSY hose) and/or SCDs, if ordered, when in bed or chair (Remove at least once per shift for skin check)  Patient educated on anticoagulation interventions. Given lovenox per MAR and ensured SCD's applied per policy

## 2017-05-30 NOTE — PROGRESS NOTES
Transfer summary:    This is 55 yo female with PMHx of multiple sclerosis, DLD, migraine initially admitted for MS flare vs temporal arteritis vs trigeminal neuralgia. Patient was started on steroids and carbamazepine. Surgery was consulted for temporal artery biopsy. Two days after admission, patient spiked fever. She was started on ceftriaxone, doxycycline. Rapid response was called today evening due to worsening respiratory status and she was then transferred to ICU.      In the ICU, she was started on vancomycin and zosyn and sepsis protocol was initiated due to suspected sepsis from pulmonary source. Sputum was cultured with no growth to date. She did require increasing amounts of oxygen and required eventually BIPAP. No need for pressors while in the ICU. She was trialed on gentle diuresis with some improvement in her respiratory status and her vancomycin was discontinued after 2 days when nasal swab came back negative for MRSA. Leukocytosis continued to improve along with respiratory status. She has been off BIPAP for 24 hours. She continues on zosyn/doxycycline with therapy scheduled to complete a 7 day course. Speech eval performed due to question of aspiration pneumonia and initially found to be indeterminate. Cortrak placed and remains on tube feeds. Overall she currently feels fine and headaches have improved, ready for transfer to medical floor.     Things to follow:  - patient pending FEES evaluation for further evaluation of swallowing   - end dates placed on doxy/zosyn therapy for pneumonia  - temporal artery biopsy negative for arteritis changes but done after she had already received steroids, currently on steroid dose taper and can decrease dose tomorrow to see if any worsening of headache   - liver function tests show worsening values, US abdomen negative, possibly medication related (?carbamezapine)      Please refer to daily progress notes for more details      Case discussed with MAHESH Johnston  Dr. Bosch

## 2017-05-30 NOTE — PROGRESS NOTES
Neurology Progress Note               Author: VENUS Jasso Cesar Date & Time created: 2017  8:51 AM     Interval History:  Feels better.  HA now a 1/10.  Still SOB but improved.  No new sx.  Temporal artery biopsy still pending.    Review of Systems:  ROS    Physical Exam:  Physical Exam   Neurological:   Alert, conversant.  L ptosis persists.  Motor is =, DTR are =.       Labs:        Invalid input(s): [id number]  Recent Labs      17   TROPONINI  <0.01     Recent Labs      17   23117   0400   SODIUM  138   --   139  139   POTASSIUM  3.6   --   3.9  3.6   CHLORIDE  101   --   102  99   CO2  25   --   27  31   BUN  31*   --   30*  26*   CREATININE  0.97   --   0.76  0.76   MAGNESIUM   --   2.2   --    --    CALCIUM  8.5   --   8.3*  8.2*     Recent Labs      17   0400   ALTSGPT   --   255*   --    ASTSGOT   --   129*   --    ALKPHOSPHAT   --   129*   --    TBILIRUBIN   --   0.5   --    GLUCOSE  176*  180*  139*     Recent Labs      17   0400   RBC  3.61*  3.34*  3.36*   HEMOGLOBIN  11.5*  10.6*  10.6*   HEMATOCRIT  34.3*  31.6*  31.6*   PLATELETCT  298  313  328     Recent Labs      17   0400   WBC  19.8*  14.1*  11.3*   NEUTSPOLYS  90.80*  89.90*  87.80*   LYMPHOCYTES  6.70*  3.30*  5.20*   MONOCYTES  1.70  1.80  1.60   EOSINOPHILS  0.00  0.10  0.70   BASOPHILS  0.00  0.40  0.40   ASTSGOT   --   129*   --    ALTSGPT   --   255*   --    ALKPHOSPHAT   --   129*   --    TBILIRUBIN   --   0.5   --      Recent Labs      17   0400   SODIUM  138  139  139   POTASSIUM  3.6  3.9  3.6   CHLORIDE  101  102  99   CO2  25  27  31   GLUCOSE  176*  180*  139*   BUN  31*  30*  26*   CREATININE  0.97  0.76  0.76   CALCIUM  8.5  8.3*  8.2*     Hemodynamics:  Temp (24hrs), Av °C (98.6 °F), Min:36.8 °C (98.3  °F), Max:37.2 °C (98.9 °F)  Temperature: 37 °C (98.6 °F)  Pulse  Av.3  Min: 48  Max: 94Heart Rate (Monitored): (!) 59  NIBP: 112/56 mmHg     Respiratory:    Respiration: 16, Pulse Oximetry: 94 %, O2 Daily Delivery Respiratory : Silicone Nasal Cannula     PEP/CPT Method: Flutter Valve, Work Of Breathing / Effort: Mild  RUL Breath Sounds: Clear, RML Breath Sounds: Diminished, RLL Breath Sounds: Diminished, JOCELYNE Breath Sounds: Clear, LLL Breath Sounds: Diminished  Fluids:    Intake/Output Summary (Last 24 hours) at 17 0851  Last data filed at 17 0600   Gross per 24 hour   Intake   1350 ml   Output   3170 ml   Net  -1820 ml     Weight: 77.2 kg (170 lb 3.1 oz)  GI/Nutrition:  Orders Placed This Encounter   Procedures   • DIET ORDER     Standing Status: Standing      Number of Occurrences: 1      Standing Expiration Date:      Order Specific Question:  Diet:     Answer:  Clear Liquid [10]     Medical Decision Making, by Problem:  Active Hospital Problems    Diagnosis   • *Unilateral neuralgiform headache [R51]   • Pneumonia likely 2/2 aspiration [J18.9]   • Multiple sclerosis (CMS-HCC) [G35]   • Neuropathy of both feet [G57.93]   • Hyperlipidemia [E78.5]   • Depression [F32.9]   • History of cardiac arrhythmia [Z86.79]   • Hyperglycemia [R73.9]   • Temporal arteritis (CMS-HCC) [M31.6]   • Respiratory failure, acute (CMS-HCC) [J96.00]   • Multiple sclerosis exacerbation (CMS-HCC) [G35]       Plan:  Temporal arteritis vs Tic.  Improvement with increased carbamazepine argues for the later.  Await biopsy, continue as is.    Core Measures

## 2017-05-30 NOTE — THERAPY
"Speech Language Therapy dysphagia treatment completed.     Functional Status: RN reporting significant improvements today -- pt is off BiPAP and on 3 liters of O2. Patient sitting up in a chair with wife at bedside, pt reported feeling \"much better today.\" Patient consumed ice chips, NTL, and puree which resulted in delayed throat clearing during 2 instances and minimum increase WOB in ~25% of trials. However, swallow trigger was timely and no vocal changes appreciated. Patient declined further PO trials d/t c/o feeling full. Patient stating \"I just want this tube out, it is irritating my throat, and I just want it out!\" RN also reporting the same and that patient has threatened to pull cortrak placing her at high risk for aspiration.     Recommendations: At this time, patient with improved swallowing function compared to previous SLP note and recommend patient begin a NTFL diet, OK for single ice chips. SLP to follow tomorrow to assess diet tolerance, if appropriate and to determine whether a FEES evaluation is warranted.      Plan of Care: Will benefit from Speech Therapy 3 times per week    Post-Acute Therapy: Discharge to a transitional care facility for continued skilled therapy services.    See \"Rehab Therapy-Acute\" Patient Summary Report for complete documentation.     "

## 2017-05-30 NOTE — PROGRESS NOTES
Pulmonary Critical Care Progress Note      Date of service: 5/30/2017    Chief Complaint: Multiple sclerosis flare up.     History of Present Illness: This is a 56-year-old lady who was admitted to [facility] on or about 05/22/2017.  She presented with an elevated Westergren sed rate, headache, nausea and vomiting.  She was felt to possibly have temporal arteritis.  She has a  history of multiple sclerosis.  She was started on high dose methylprednisolone.  She was seen by neurology.  The plan is for her to have a temporal artery biopsy tomorrow.  Today, I was called because of tachypnea, fever and increasing oxygen requirements.  She has now been transferred to the intensive care unit.      ROS:    Respiratory: positive cough, negative shortness of breath and negative sputum production,   Cardiac: negative chest pain,   GI: negative abdominal pain.    All other systems negative.      Interval Events:  24 hour interval history reviewed   Headache has improved   Afebrile  Alert and oriented, follows   Abdominal US this morning  Tube feeds tolerated, complaining of Cortrak   Did not wear BiPAP for 24 hours  No CXR today    Plan:  Okay to transfer  Speech eval     Yesterday's Events:  Pt had episode of severe CP this morning with negative cardiac workup   - good response with toradol  Excellent UOP with gentle diuresis  Edge of bed with 1-person assist  CXR: improved left pleural effusion, but RLL consolidation persists.      PFSH:  No change.      Physical Exam  General: Conversing and asking appropriate questions.   Neuro/Psych: Alert and oriented.   HEENT: PERRL.   CVS: Sinus rhythm, regular rate.   Respiratory: Crackles right base but otherwise clear. Breathing comfortably on NC.  Abdomen/: Soft, non-tender.  Extremities: trace pedal edema. 2+ dorsal pedal pulses      Respiratory:     Pulse Oximetry: 94 %  ImagingAvailable data reviewed         Invalid input(s):  [id number]    HemoDynamics:  Pulse: (!) 56, Heart Rate (Monitored): (!) 56  NIBP: 124/65 mmHg    Imaging: Available data reviewed   Echo 5/26 - EF 60%, RVSP 48-50    Recent Labs      05/28/17   2319   TROPONINI  <0.01     Neuro:  GCS Total Elfrida Coma Score: 15  Imaging: Available data reviewed    Fluids:  Intake/Output       05/28/17 0700 - 05/29/17 0659 05/29/17 0700 - 05/30/17 0659 05/30/17 0700 - 05/31/17 0659      3672-3960 7991-6475 Total 2002-5944 6443-3055 Total 1010-2395 7499-6433 Total       Intake    P.O.  330  700 1030  --  -- --  --  -- --    P.O.  -- -- -- -- -- --    I.V.  200  200 400  200  300 500  --  -- --    IV Volume (NS) -- -- -- -- 100 100 -- -- --    IV Piggyback Volume (IV Piggyback) 200 200 400 200 200 400 -- -- --    Other  50  240 290  120  120 240  --  -- --    Medications (P.O./ Enteral Liquids) 50 240 290 120 120 240 -- -- --    Enteral  465  510 975  510  300 810  --  -- --    Enteral Volume 420 420 840 420 210 630 -- -- --    Free Water / Tube Flush 45 90 135 90 90 180 -- -- --    Total Intake 1045 1650 2695  -- -- --       Output    Urine  1525  1400 2925  1970 -- 1970  --  -- --    Indwelling Cathether 1525 1400 2925 1970 -- 1970 -- -- --    Stool/Urine  0  0 0  --  -- --  --  -- --    Measurable Stool (ml) 0 0 0 -- -- -- -- -- --    Stool  --  -- --  --  -- --  --  -- --    Number of Times Stooled 0 x 2 x 2 x 1 x -- 1 x -- -- --    Total Output 1525 1400 2925 1970 -- 1970 -- -- --       Net I/O     -480 250 -230 -1140 720 -420 -- -- --           Recent Labs      05/28/17   0455  05/28/17   2319  05/29/17   1007  05/30/17   0400   SODIUM  138   --   139  139   POTASSIUM  3.6   --   3.9  3.6   CHLORIDE  101   --   102  99   CO2  25   --   27  31   BUN  31*   --   30*  26*   CREATININE  0.97   --   0.76  0.76   MAGNESIUM   --   2.2   --    --    CALCIUM  8.5   --   8.3*  8.2*     GI/Nutrition:  Imaging: Available data reviewed  NPO and tube feed  Tolerated     Liver Function:  Recent Labs      17   0455  17   0400   ALTSGPT   --   255*   --    ASTSGOT   --   129*   --    ALKPHOSPHAT   --   129*   --    TBILIRUBIN   --   0.5   --    GLUCOSE  176*  180*  139*     Heme:  Recent Labs      17   0455  17   0400   RBC  3.61*  3.34*  3.36*   HEMOGLOBIN  11.5*  10.6*  10.6*   HEMATOCRIT  34.3*  31.6*  31.6*   PLATELETCT  298  313  328     Infectious Disease:  Temp  Av °C (98.6 °F)  Min: 36.8 °C (98.3 °F)  Max: 37.2 °C (98.9 °F)  Micro: reviewed     Abx:  Doxycycline   Vancomycin stopped   Zosyn    bcx  ngtd  bcx  ngtd  MRSA negative  Flu (-)       Recent Labs      17   0400   WBC  19.8*  14.1*  11.3*   NEUTSPOLYS  90.80*  89.90*  87.80*   LYMPHOCYTES  6.70*  3.30*  5.20*   MONOCYTES  1.70  1.80  1.60   EOSINOPHILS  0.00  0.10  0.70   BASOPHILS  0.00  0.40  0.40   ASTSGOT   --   129*   --    ALTSGPT   --   255*   --    ALKPHOSPHAT   --   129*   --    TBILIRUBIN   --   0.5   --      Current Facility-Administered Medications   Medication Dose Frequency Provider Last Rate Last Dose   • carbamazepine (TEGRETOL) 100 MG/5ML suspension 200 mg  200 mg TID VENUS Guerrero M.D.   200 mg at 17   • methylPREDNISolone sod succ (SOLU-MEDROL) 125 MG injection 62.5 mg  62.5 mg Q12HRS VENUS Guerrero M.D.   62.5 mg at 17   • potassium chloride (KLOR-CON) 20 MEQ packet 40 mEq  40 mEq DAILY Vikash Warren M.D.   40 mEq at 17 0831   • ketorolac (TORADOL) injection 30 mg  30 mg Q6HRS PRN Jonny Laws M.D.   30 mg at 17 2356   • HYDROmorphone (DILAUDID) injection 0.5-2 mg  0.5-2 mg Q3HRS PRN Jonny Laws M.D.       • furosemide (LASIX) injection 20 mg  20 mg Q8HRS Vitaliy Chavarria M.D.   20 mg at 172   • Dimethyl fumarate (TECFIDERA) 240 mg Capsules  240 mg BID VENUS Guerrero M.D.   240 mg at 17 2100   •  calcium carbonate (OS-JAYESH 500) tablet 500 mg  500 mg BID WITH MEALS Jinny MORALES M.D.   500 mg at 05/29/17 1710   • oxycodone immediate-release (ROXICODONE) tablet 5 mg  5 mg Q6HRS PRN Vikash Warren M.D.   5 mg at 05/28/17 1431   • doxycycline monohydrate (ADOXA) tablet 100 mg  100 mg Q12HRS Jonny Laws M.D.   100 mg at 05/29/17 2122   • acetaminophen (TYLENOL) tablet 650 mg  650 mg Q4HRS PRN Vikash Warren M.D.   650 mg at 05/29/17 2128   • albuterol inhaler 2 Puff  2 Puff Q2HRS PRN Navin Cosme M.D.   2 Puff at 05/25/17 1946   • acetaminophen (TYLENOL) suppository 650 mg  650 mg Q6HRS PRN Tommy García M.D.   Stopped at 05/26/17 1135   • piperacillin-tazobactam (ZOSYN) 4.5 g in  mL IVPB  4.5 g Q6HRS Jonny Laws M.D. 100 mL/hr at 05/30/17 0347 4.5 g at 05/30/17 0347   • insulin lispro (HUMALOG) injection 2-9 Units  2-9 Units 4X/DAY SILVANO Em M.D.   Stopped at 05/29/17 1700   • senna-docusate (PERICOLACE or SENOKOT S) 8.6-50 MG per tablet 2 Tab  2 Tab BID Ree Frazier M.D.   Stopped at 05/29/17 0900    And   • polyethylene glycol/lytes (MIRALAX) PACKET 1 Packet  1 Packet QDAY PRN Ree Frazier M.D.   Stopped at 05/26/17 0903    And   • magnesium hydroxide (MILK OF MAGNESIA) suspension 30 mL  30 mL QDAY PRN Ree Frazier M.D.   30 mL at 05/26/17 0910    And   • bisacodyl (DULCOLAX) suppository 10 mg  10 mg QDAY PRN Ree Frazier M.D.   10 mg at 05/26/17 2141   • Respiratory Care per Protocol   Continuous RT Ree Frazier M.D.       • enoxaparin (LOVENOX) inj 40 mg  40 mg DAILY Ree Frazier M.D.   40 mg at 05/29/17 0832   • pregabalin (LYRICA) capsule 75 mg  75 mg BID Ree Frazier M.D.   75 mg at 05/29/17 2123   • sertraline (ZOLOFT) tablet 50 mg  50 mg QHS Ree Frazier M.D.   50 mg at 05/29/17 2122   • tizanidine (ZANAFLEX) tablet 2 mg  2 mg BID Ree Frazier M.D.   2 mg at 05/29/17 2125   • oxybutynin SR (DITROPAN-XL) tablet 10 mg  10 mg Q EVENING  Ree Frazier M.D.   10 mg at 05/29/17 2124   • ondansetron (ZOFRAN) syringe/vial injection 4 mg  4 mg Q4HRS PRN Ree Frazier M.D.   4 mg at 05/29/17 1812   • ondansetron (ZOFRAN ODT) dispertab 4 mg  4 mg Q4HRS PRN Ree Frazier M.D.       • promethazine (PHENERGAN) tablet 12.5-25 mg  12.5-25 mg Q4HRS PRN Ree Frazier M.D.       • promethazine (PHENERGAN) suppository 12.5-25 mg  12.5-25 mg Q4HRS PRN Ree Frazier M.D.       • prochlorperazine (COMPAZINE) injection 5-10 mg  5-10 mg Q4HRS PRN Ree Frazier M.D.   10 mg at 05/23/17 0557   • pravastatin (PRAVACHOL) tablet 40 mg  40 mg Nightly Ree Frazier M.D.   40 mg at 05/29/17 2122   • metoprolol (LOPRESSOR) tablet 25 mg  25 mg TWICE DAILY Ree Frazier M.D.   Stopped at 05/29/17 0900     Last reviewed on 5/26/2017  5:15 PM by Marisa Rucker R.N.    Quality  Measures:  Labs reviewed, Medications reviewed, Radiology images reviewed and EKG reviewed  Schmitt catheter: Critically Ill - Requiring Accurate Measurement of Urinary Output      DVT Prophylaxis: Enoxaparin (Lovenox)  DVT prophylaxis - mechanical: SCDs  Ulcer prophylaxis: Not indicated        Assessment and Plan:  -  Acute hypoxemic respiratory failure.   - resolving   - Aspiration/Pna +/- fluid   - HFNC -> bipap ->6L and no BiPAP for > 24 hours   - unusual to have pulmonary involvement w GCA/TA   - cont rt/02 protocols   - IS/PEP/IPV as needed  -  Pneumonia.   - empiric broad spectrum abx   - mrsa swab (-), vanco stopped   - f/u cultures  -  Sepsis, pulmonary source.  -  Query temporal arteritis.     - s/p 3 days of solumedrol 500 q12 on 5/25   - s/p bx 5/26 pending result   - esr 140->115   - headache improving   - continue solumedrol 62.5 q8  -  Leukocytosis   - up/down, minimal bands today   - not requiring pressors   - cultures pending   - on empiric abx coverage  -  Multiple sclerosis with query acute flare   - neuro following   - on tecfidera   - on steroids  -  Anemia.  -  History of seizures.  -   Dysphagia   - Speech therapy following    Stable from an ICU perspective and will transfer patient to the medical floor today with pulmonary to continue to follow.      Discussed patient condition and risk of morbidity and/or mortality with RN, RT, Pharmacy, Charge nurse / hot rounds, Patient and [facility].    [id number]     Cristina MONTOYA (Scribe), am scribing for, and in the presence of, Toyin Ness M.D..    Electronically signed by: Cristina Pina (Chris), 5/30/2017    Toyin MONTOYA M.D. personally performed the services described in this documentation, as scribed by Cristina Pina in my presence, and it is both accurate and complete.

## 2017-05-30 NOTE — CARE PLAN
Problem: Infection  Goal: Will remain free from infection  Intervention: Assess signs and symptoms of infection  Patient presenting with no s/s infection, on ABX per MAR. Will continue to monitor for s/s infection and related complications

## 2017-05-31 ENCOUNTER — APPOINTMENT (OUTPATIENT)
Dept: RADIOLOGY | Facility: MEDICAL CENTER | Age: 57
DRG: 040 | End: 2017-05-31
Attending: STUDENT IN AN ORGANIZED HEALTH CARE EDUCATION/TRAINING PROGRAM
Payer: MEDICARE

## 2017-05-31 LAB
ALBUMIN SERPL BCP-MCNC: 2.4 G/DL (ref 3.2–4.9)
ALBUMIN/GLOB SERPL: 0.7 G/DL
ALP SERPL-CCNC: 101 U/L (ref 30–99)
ALT SERPL-CCNC: 141 U/L (ref 2–50)
ANION GAP SERPL CALC-SCNC: 8 MMOL/L (ref 0–11.9)
AST SERPL-CCNC: 70 U/L (ref 12–45)
BASOPHILS # BLD AUTO: 0.4 % (ref 0–1.8)
BASOPHILS # BLD: 0.03 K/UL (ref 0–0.12)
BILIRUB SERPL-MCNC: 0.5 MG/DL (ref 0.1–1.5)
BUN SERPL-MCNC: 20 MG/DL (ref 8–22)
CALCIUM SERPL-MCNC: 8.1 MG/DL (ref 8.5–10.5)
CHLORIDE SERPL-SCNC: 99 MMOL/L (ref 96–112)
CO2 SERPL-SCNC: 29 MMOL/L (ref 20–33)
CREAT SERPL-MCNC: 0.65 MG/DL (ref 0.5–1.4)
EOSINOPHIL # BLD AUTO: 0.04 K/UL (ref 0–0.51)
EOSINOPHIL NFR BLD: 0.5 % (ref 0–6.9)
ERYTHROCYTE [DISTWIDTH] IN BLOOD BY AUTOMATED COUNT: 46.5 FL (ref 35.9–50)
GFR SERPL CREATININE-BSD FRML MDRD: >60 ML/MIN/1.73 M 2
GLOBULIN SER CALC-MCNC: 3.3 G/DL (ref 1.9–3.5)
GLUCOSE BLD-MCNC: 105 MG/DL (ref 65–99)
GLUCOSE BLD-MCNC: 114 MG/DL (ref 65–99)
GLUCOSE BLD-MCNC: 123 MG/DL (ref 65–99)
GLUCOSE BLD-MCNC: 124 MG/DL (ref 65–99)
GLUCOSE SERPL-MCNC: 149 MG/DL (ref 65–99)
HCT VFR BLD AUTO: 32.6 % (ref 37–47)
HGB BLD-MCNC: 10.9 G/DL (ref 12–16)
IMM GRANULOCYTES # BLD AUTO: 0.27 K/UL (ref 0–0.11)
IMM GRANULOCYTES NFR BLD AUTO: 3.4 % (ref 0–0.9)
LYMPHOCYTES # BLD AUTO: 0.58 K/UL (ref 1–4.8)
LYMPHOCYTES NFR BLD: 7.2 % (ref 22–41)
MCH RBC QN AUTO: 31.6 PG (ref 27–33)
MCHC RBC AUTO-ENTMCNC: 33.4 G/DL (ref 33.6–35)
MCV RBC AUTO: 94.5 FL (ref 81.4–97.8)
MONOCYTES # BLD AUTO: 0.2 K/UL (ref 0–0.85)
MONOCYTES NFR BLD AUTO: 2.5 % (ref 0–13.4)
NEUTROPHILS # BLD AUTO: 6.89 K/UL (ref 2–7.15)
NEUTROPHILS NFR BLD: 86 % (ref 44–72)
NRBC # BLD AUTO: 0 K/UL
NRBC BLD AUTO-RTO: 0 /100 WBC
PLATELET # BLD AUTO: 356 K/UL (ref 164–446)
PMV BLD AUTO: 10.5 FL (ref 9–12.9)
POTASSIUM SERPL-SCNC: 3.8 MMOL/L (ref 3.6–5.5)
PROT SERPL-MCNC: 5.7 G/DL (ref 6–8.2)
RBC # BLD AUTO: 3.45 M/UL (ref 4.2–5.4)
SODIUM SERPL-SCNC: 136 MMOL/L (ref 135–145)
WBC # BLD AUTO: 8 K/UL (ref 4.8–10.8)

## 2017-05-31 PROCEDURE — 700111 HCHG RX REV CODE 636 W/ 250 OVERRIDE (IP): Performed by: SPECIALIST

## 2017-05-31 PROCEDURE — 82962 GLUCOSE BLOOD TEST: CPT | Mod: 91

## 2017-05-31 PROCEDURE — 700102 HCHG RX REV CODE 250 W/ 637 OVERRIDE(OP): Performed by: INTERNAL MEDICINE

## 2017-05-31 PROCEDURE — 71010 DX-CHEST-PORTABLE (1 VIEW): CPT

## 2017-05-31 PROCEDURE — 700102 HCHG RX REV CODE 250 W/ 637 OVERRIDE(OP): Performed by: STUDENT IN AN ORGANIZED HEALTH CARE EDUCATION/TRAINING PROGRAM

## 2017-05-31 PROCEDURE — 700111 HCHG RX REV CODE 636 W/ 250 OVERRIDE (IP): Performed by: STUDENT IN AN ORGANIZED HEALTH CARE EDUCATION/TRAINING PROGRAM

## 2017-05-31 PROCEDURE — 700102 HCHG RX REV CODE 250 W/ 637 OVERRIDE(OP): Performed by: HOSPITALIST

## 2017-05-31 PROCEDURE — A9270 NON-COVERED ITEM OR SERVICE: HCPCS | Performed by: STUDENT IN AN ORGANIZED HEALTH CARE EDUCATION/TRAINING PROGRAM

## 2017-05-31 PROCEDURE — A9270 NON-COVERED ITEM OR SERVICE: HCPCS | Performed by: INTERNAL MEDICINE

## 2017-05-31 PROCEDURE — 80053 COMPREHEN METABOLIC PANEL: CPT

## 2017-05-31 PROCEDURE — 700105 HCHG RX REV CODE 258: Performed by: STUDENT IN AN ORGANIZED HEALTH CARE EDUCATION/TRAINING PROGRAM

## 2017-05-31 PROCEDURE — 85025 COMPLETE CBC W/AUTO DIFF WBC: CPT

## 2017-05-31 PROCEDURE — 770006 HCHG ROOM/CARE - MED/SURG/GYN SEMI*

## 2017-05-31 PROCEDURE — A9270 NON-COVERED ITEM OR SERVICE: HCPCS | Performed by: SPECIALIST

## 2017-05-31 PROCEDURE — A9270 NON-COVERED ITEM OR SERVICE: HCPCS | Performed by: HOSPITALIST

## 2017-05-31 PROCEDURE — 700102 HCHG RX REV CODE 250 W/ 637 OVERRIDE(OP): Performed by: SPECIALIST

## 2017-05-31 PROCEDURE — 92526 ORAL FUNCTION THERAPY: CPT

## 2017-05-31 PROCEDURE — 99232 SBSQ HOSP IP/OBS MODERATE 35: CPT | Performed by: INTERNAL MEDICINE

## 2017-05-31 PROCEDURE — 700111 HCHG RX REV CODE 636 W/ 250 OVERRIDE (IP): Performed by: INTERNAL MEDICINE

## 2017-05-31 RX ORDER — POTASSIUM CHLORIDE 20 MEQ/1
40 TABLET, EXTENDED RELEASE ORAL DAILY
Status: DISCONTINUED | OUTPATIENT
Start: 2017-05-31 | End: 2017-06-02 | Stop reason: HOSPADM

## 2017-05-31 RX ORDER — METHYLPREDNISOLONE SODIUM SUCCINATE 125 MG/2ML
62.5 INJECTION, POWDER, LYOPHILIZED, FOR SOLUTION INTRAMUSCULAR; INTRAVENOUS DAILY
Status: DISCONTINUED | OUTPATIENT
Start: 2017-06-01 | End: 2017-06-02 | Stop reason: HOSPADM

## 2017-05-31 RX ORDER — CALCIUM CARBONATE 500 MG/1
1000 TABLET, CHEWABLE ORAL 4 TIMES DAILY PRN
Status: DISCONTINUED | OUTPATIENT
Start: 2017-05-31 | End: 2017-06-02 | Stop reason: HOSPADM

## 2017-05-31 RX ORDER — POTASSIUM CHLORIDE 1.5 G/1.58G
40 POWDER, FOR SOLUTION ORAL DAILY
Status: DISCONTINUED | OUTPATIENT
Start: 2017-05-31 | End: 2017-06-02 | Stop reason: HOSPADM

## 2017-05-31 RX ORDER — FAMOTIDINE 20 MG/1
20 TABLET, FILM COATED ORAL 2 TIMES DAILY
Status: DISCONTINUED | OUTPATIENT
Start: 2017-05-31 | End: 2017-06-02 | Stop reason: HOSPADM

## 2017-05-31 RX ADMIN — CARBAMAZEPINE 200 MG: 200 TABLET ORAL at 15:33

## 2017-05-31 RX ADMIN — PIPERACILLIN SODIUM AND TAZOBACTAM SODIUM 4.5 G: 4; .5 INJECTION, POWDER, FOR SOLUTION INTRAVENOUS at 05:13

## 2017-05-31 RX ADMIN — ENOXAPARIN SODIUM 40 MG: 100 INJECTION SUBCUTANEOUS at 09:46

## 2017-05-31 RX ADMIN — PIPERACILLIN SODIUM AND TAZOBACTAM SODIUM 4.5 G: 4; .5 INJECTION, POWDER, FOR SOLUTION INTRAVENOUS at 10:00

## 2017-05-31 RX ADMIN — PIPERACILLIN SODIUM AND TAZOBACTAM SODIUM 4.5 G: 4; .5 INJECTION, POWDER, FOR SOLUTION INTRAVENOUS at 21:10

## 2017-05-31 RX ADMIN — PRAVASTATIN SODIUM 40 MG: 20 TABLET ORAL at 21:10

## 2017-05-31 RX ADMIN — METHYLPREDNISOLONE SODIUM SUCCINATE 62.5 MG: 125 INJECTION, POWDER, FOR SOLUTION INTRAMUSCULAR; INTRAVENOUS at 09:44

## 2017-05-31 RX ADMIN — CARBAMAZEPINE 200 MG: 200 TABLET ORAL at 09:46

## 2017-05-31 RX ADMIN — BENZOCAINE AND MENTHOL 1 LOZENGE: 15; 3.6 LOZENGE ORAL at 16:27

## 2017-05-31 RX ADMIN — TIZANIDINE 2 MG: 4 TABLET ORAL at 09:45

## 2017-05-31 RX ADMIN — DOXYCYCLINE 100 MG: 100 TABLET ORAL at 21:10

## 2017-05-31 RX ADMIN — SERTRALINE 50 MG: 50 TABLET, FILM COATED ORAL at 21:09

## 2017-05-31 RX ADMIN — PREGABALIN 75 MG: 75 CAPSULE ORAL at 21:10

## 2017-05-31 RX ADMIN — Medication 500 MG: at 17:10

## 2017-05-31 RX ADMIN — FAMOTIDINE 20 MG: 20 TABLET, FILM COATED ORAL at 21:10

## 2017-05-31 RX ADMIN — PIPERACILLIN SODIUM AND TAZOBACTAM SODIUM 4.5 G: 4; .5 INJECTION, POWDER, FOR SOLUTION INTRAVENOUS at 15:33

## 2017-05-31 RX ADMIN — FUROSEMIDE 20 MG: 10 INJECTION, SOLUTION INTRAMUSCULAR; INTRAVENOUS at 06:11

## 2017-05-31 RX ADMIN — ACETAMINOPHEN 650 MG: 325 TABLET, FILM COATED ORAL at 13:56

## 2017-05-31 RX ADMIN — OXYBUTYNIN CHLORIDE 10 MG: 10 TABLET, FILM COATED, EXTENDED RELEASE ORAL at 21:10

## 2017-05-31 RX ADMIN — ACETAMINOPHEN 650 MG: 325 TABLET, FILM COATED ORAL at 21:10

## 2017-05-31 RX ADMIN — POTASSIUM CHLORIDE 40 MEQ: 1500 TABLET, EXTENDED RELEASE ORAL at 10:01

## 2017-05-31 RX ADMIN — TIZANIDINE 2 MG: 4 TABLET ORAL at 21:09

## 2017-05-31 RX ADMIN — CALCIUM CARBONATE 1000 MG: 500 TABLET ORAL at 11:19

## 2017-05-31 RX ADMIN — FUROSEMIDE 20 MG: 10 INJECTION, SOLUTION INTRAMUSCULAR; INTRAVENOUS at 15:33

## 2017-05-31 RX ADMIN — FAMOTIDINE 20 MG: 20 TABLET, FILM COATED ORAL at 09:46

## 2017-05-31 RX ADMIN — ONDANSETRON 4 MG: 2 INJECTION INTRAMUSCULAR; INTRAVENOUS at 17:09

## 2017-05-31 RX ADMIN — DOXYCYCLINE 100 MG: 100 TABLET ORAL at 09:46

## 2017-05-31 RX ADMIN — CARBAMAZEPINE 200 MG: 200 TABLET ORAL at 21:09

## 2017-05-31 RX ADMIN — PREGABALIN 75 MG: 75 CAPSULE ORAL at 09:45

## 2017-05-31 RX ADMIN — METOPROLOL TARTRATE 25 MG: 25 TABLET, FILM COATED ORAL at 09:46

## 2017-05-31 RX ADMIN — Medication 500 MG: at 06:11

## 2017-05-31 ASSESSMENT — PAIN SCALES - GENERAL
PAINLEVEL_OUTOF10: 4
PAINLEVEL_OUTOF10: 0
PAINLEVEL_OUTOF10: 2
PAINLEVEL_OUTOF10: 0
PAINLEVEL_OUTOF10: 0
PAINLEVEL_OUTOF10: 2
PAINLEVEL_OUTOF10: 0
PAINLEVEL_OUTOF10: 0
PAINLEVEL_OUTOF10: 1
PAINLEVEL_OUTOF10: 0
PAINLEVEL_OUTOF10: 4
PAINLEVEL_OUTOF10: 0

## 2017-05-31 NOTE — THERAPY
"Speech Language Therapy dysphagia treatment completed.   Functional Status:  Fxnl swallow for diet upgrade trialed this morning during breakfast.  Min throat clear infrequently and cough 1x post straw sip with thin liquids.  Pt able to verbalize understanding of s/s of aspiration.  Recommendations: Upgrade diet to regular without straws.  Pt to self monitor and take sm bites/sips.   Plan of Care: Will benefit from Speech Therapy 3 times per week  Post-Acute Therapy: Currently anticipate no further skilled therapy needs once patient is discharged from the inpatient setting.    See \"Rehab Therapy-Acute\" Patient Summary Report for complete documentation.     "

## 2017-05-31 NOTE — PROGRESS NOTES
Pulmonary Critical Care Progress Note      Date of service: 5/31/2017    Chief Complaint: Multiple sclerosis flare up.     History of Present Illness: This is a 56-year-old lady who was admitted to [facility] on or about 05/22/2017.  She presented with an elevated Westergren sed rate, headache, nausea and vomiting.  She was felt to possibly have temporal arteritis.  She has a  history of multiple sclerosis.  She was started on high dose methylprednisolone.  She was seen by neurology.  The plan is for her to have a temporal artery biopsy tomorrow.  Today, I was called because of tachypnea, fever and increasing oxygen requirements.  She has now been transferred to the intensive care unit.    ROS:    Respiratory: positive cough, negative shortness of breath and negative sputum production,   Cardiac: negative chest pain,   GI: negative abdominal pain.    All other systems negative.    Interval Events:  24 hour interval history reviewed   Left temporal pain present, but not as severe as prior.   Gagging relieved.  SR, BP OK.  Self-diuresing well with Lasix.  On 4L NC, off BiPAP.   Mobilizing well.   CXR with bilateral pleural effusions and minimal consolidation to right middle lobe.     Plan:  Tums for abdominal discomfort.   Transfer out of ICU pending.     Yesterday's Events:  Headache has improved   Afebrile  Alert and oriented, follows   Abdominal US this morning  Tube feeds tolerated, complaining of Cortrak   Did not wear BiPAP for 24 hours  No CXR today    Plan:  Okay to transfer  Speech eval     PFSH:  No change.    Physical Exam:  General:  Conversing well.   HEENT:  PERRL. Sclera anicteric.   CVS:  RRR.  Respiratory:  Crackles at right base, but otherwise very clear.   Abdomen:  Soft, NT/ND.   Extremities:  No pedal edema. 2+ dorsal pedal pulses. Calves are non-tender.   Neuro/Psych:  Clear speech, awake, appropriately answering all questions.      Respiratory:     Pulse Oximetry: 94  %  ImagingAvailable data reviewed         Invalid input(s): [id number]    HemoDynamics:  Pulse: 67, Heart Rate (Monitored): 66  Blood Pressure: 115/62 mmHg, NIBP: 113/63 mmHg    Imaging: Available data reviewed   Echo 5/26 - EF 60%, RVSP 48-50  Recent Labs      05/28/17 2319   TROPONINI  <0.01     Neuro:  GCS Total [place] Coma Score: 15  Imaging: Available data reviewed    Fluids:  Intake/Output       05/29/17 0700 - 05/30/17 0659 05/30/17 0700 - 05/31/17 0659 05/31/17 0700 - 06/01/17 0659      8866-8375 6664-2668 Total 2620-9815 1823-0740 Total 2061-8448 9869-7202 Total       Intake    P.O.  --  -- --  600  420 1020  --  -- --    P.O. -- -- --  -- -- --    I.V.  200  320 520  320  300 620  --  -- --    IV Volume (NS) -- 120 120 120 100 220 -- -- --    IV Piggyback Volume (IV Piggyback) 200 200 400 200 200 400 -- -- --    Other  120  120 240  150  60 210  --  -- --    Medications (P.O./ Enteral Liquids) 120 120 240 150 60 210 -- -- --    Enteral  510  300 810  190  -- 190  --  -- --    Enteral Volume 420 210 630 70 -- 70 -- -- --    Free Water / Tube Flush 90 90 180 120 -- 120 -- -- --    Total Intake  7688 706 8956 -- -- --       Output    Urine  1970 1800 3770  2500  -- 2500  --  -- --    Indwelling Cathether 1970 1800 3770 2500 -- 2500 -- -- --    Stool  --  -- --  --  -- --  --  -- --    Number of Times Stooled 1 x 1 x 2 x 1 x 3 x 4 x -- -- --    Total Output 1970 1800 3770 2500 -- 2500 -- -- --       Net I/O     -1140 -1060 -2200 -1240 780 -460 -- -- --           Recent Labs      05/28/17   2319 05/29/17   1007  05/30/17   0400   SODIUM   --   139  139   POTASSIUM   --   3.9  3.6   CHLORIDE   --   102  99   CO2   --   27  31   BUN   --   30*  26*   CREATININE   --   0.76  0.76   MAGNESIUM  2.2   --    --    CALCIUM   --   8.3*  8.2*     GI/Nutrition:  Imaging: Available data reviewed  NPO and tube feed Tolerated     Liver Function:  Recent Labs      05/29/17   1007  05/30/17    0400   ALTSGPT  255*  173*   ASTSGOT  129*  53*   ALKPHOSPHAT  129*  112*   TBILIRUBIN  0.5  0.5   DBILIRUBIN   --   0.1   GLUCOSE  180*  139*     Heme:  Recent Labs      17   1007  17   0400   RBC  3.34*  3.36*   HEMOGLOBIN  10.6*  10.6*   HEMATOCRIT  31.6*  31.6*   PLATELETCT  313  328     Infectious Disease:  Temp  Av.9 °C (98.4 °F)  Min: 36.7 °C (98 °F)  Max: 37.1 °C (98.8 °F)  Micro: reviewed     Abx:  Doxycycline   Vancomycin stopped   Zosyn    bcx  ngtd  bcx  ngtd  MRSA negative  Flu (-)   Recent Labs      17   1007  17   0400   WBC  14.1*  11.3*   NEUTSPOLYS  89.90*  87.80*   LYMPHOCYTES  3.30*  5.20*   MONOCYTES  1.80  1.60   EOSINOPHILS  0.10  0.70   BASOPHILS  0.40  0.40   ASTSGOT  129*  53*   ALTSGPT  255*  173*   ALKPHOSPHAT  129*  112*   TBILIRUBIN  0.5  0.5     Current Facility-Administered Medications   Medication Dose Frequency Provider Last Rate Last Dose   • benzocaine-menthol (CEPACOL) lozenge 1 Lozenge  1 Lozenge Q2HRS PRN Toyin Ness M.D.       • furosemide (LASIX) injection 20 mg  20 mg BID DIURETIC Nba Samuel M.D.   20 mg at 17 1510   • carbamazepine (TEGRETOL) tablet 200 mg  200 mg TID VENUS Guerrero M.D.   Stopped at 17   • methylPREDNISolone sod succ (SOLU-MEDROL) 125 MG injection 62.5 mg  62.5 mg Q12HRS VENUS Guerrero M.D.   62.5 mg at 177   • potassium chloride (KLOR-CON) 20 MEQ packet 40 mEq  40 mEq DAILY Vikash Warren M.D.   40 mEq at 17 0910   • ketorolac (TORADOL) injection 30 mg  30 mg Q6HRS PRN Jonny Laws M.D.   30 mg at 17 9836   • HYDROmorphone (DILAUDID) injection 0.5-2 mg  0.5-2 mg Q3HRS PRN Jonny Laws M.D.       • Dimethyl fumarate (TECFIDERA) 240 mg Capsules  240 mg BID VENUS Guerrero M.D.   240 mg at 17 2100   • calcium carbonate (OS-JAYESH 500) tablet 500 mg  500 mg BID WITH MEALS Jinny MORALES M.D.   500 mg at 17 1800   • oxycodone  immediate-release (ROXICODONE) tablet 5 mg  5 mg Q6HRS PRN Vikash Warren M.D.   5 mg at 05/28/17 1431   • doxycycline monohydrate (ADOXA) tablet 100 mg  100 mg Q12HRS Nba Samuel M.D.   100 mg at 05/30/17 2157   • acetaminophen (TYLENOL) tablet 650 mg  650 mg Q4HRS PRN Vikash Warren M.D.   650 mg at 05/30/17 2217   • albuterol inhaler 2 Puff  2 Puff Q2HRS PRN Navin Cosme M.D.   2 Puff at 05/25/17 1946   • acetaminophen (TYLENOL) suppository 650 mg  650 mg Q6HRS PRN Tommy García M.D.   Stopped at 05/26/17 1135   • piperacillin-tazobactam (ZOSYN) 4.5 g in  mL IVPB  4.5 g Q6HRS Nba Samuel M.D.   Stopped at 05/31/17 0613   • insulin lispro (HUMALOG) injection 2-9 Units  2-9 Units 4X/DAY SIVLANO Em M.D.   Stopped at 05/29/17 1700   • senna-docusate (PERICOLACE or SENOKOT S) 8.6-50 MG per tablet 2 Tab  2 Tab BID Ree Frazier M.D.   Stopped at 05/29/17 0900    And   • polyethylene glycol/lytes (MIRALAX) PACKET 1 Packet  1 Packet QDAY PRN Ree Frazier M.D.   Stopped at 05/26/17 0903    And   • magnesium hydroxide (MILK OF MAGNESIA) suspension 30 mL  30 mL QDAY PRN Ree Frazier M.D.   30 mL at 05/26/17 0910    And   • bisacodyl (DULCOLAX) suppository 10 mg  10 mg QDAY PRN Ree Frazier M.D.   10 mg at 05/26/17 2141   • Respiratory Care per Protocol   Continuous RT Ree Frazier M.D.       • enoxaparin (LOVENOX) inj 40 mg  40 mg DAILY Ree Frazier M.D.   40 mg at 05/30/17 0910   • pregabalin (LYRICA) capsule 75 mg  75 mg BID Ree Frazier M.D.   75 mg at 05/30/17 2157   • sertraline (ZOLOFT) tablet 50 mg  50 mg QHS Ree Frazier M.D.   50 mg at 05/30/17 2157   • tizanidine (ZANAFLEX) tablet 2 mg  2 mg BID Ree Frazier M.D.   2 mg at 05/30/17 2148   • oxybutynin SR (DITROPAN-XL) tablet 10 mg  10 mg Q EVENING Ree Frazier M.D.   10 mg at 05/30/17 2148   • ondansetron (ZOFRAN) syringe/vial injection 4 mg  4 mg Q4HRS PRN Ree Frazier M.D.   4 mg at 05/30/17 1731   •  ondansetron (ZOFRAN ODT) dispertab 4 mg  4 mg Q4HRS PRN Ree Frazier M.D.       • promethazine (PHENERGAN) tablet 12.5-25 mg  12.5-25 mg Q4HRS PRN Ree Frazier M.D.       • promethazine (PHENERGAN) suppository 12.5-25 mg  12.5-25 mg Q4HRS PRN Ree Frazier M.D.       • prochlorperazine (COMPAZINE) injection 5-10 mg  5-10 mg Q4HRS PRN Ree Frazier M.D.   10 mg at 05/23/17 0557   • pravastatin (PRAVACHOL) tablet 40 mg  40 mg Nightly Ree Frazier M.D.   40 mg at 05/30/17 2148   • metoprolol (LOPRESSOR) tablet 25 mg  25 mg TWICE DAILY Ree Frazier M.D.   Stopped at 05/30/17 2100     Last reviewed on 5/26/2017  5:15 PM by Marisa Rucker R.N.    Quality  Measures:  Labs reviewed, Medications reviewed, Radiology images reviewed and EKG reviewed  Schmitt catheter: Critically Ill - Requiring Accurate Measurement of Urinary Output      DVT Prophylaxis: Enoxaparin (Lovenox)  DVT prophylaxis - mechanical: SCDs  Ulcer prophylaxis: Not indicated        Assessment and Plan:  -  Acute hypoxemic respiratory failure.   - resolving   - Aspiration/Pna +/- fluid   - HFNC -> bipap ->6L and no BiPAP for > 24 hours   - unusual to have pulmonary involvement w GCA/TA   - cont rt/02 protocols   - IS/PEP/IPV as needed  -  Pneumonia.   - empiric broad spectrum abx   - mrsa swab (-), vanco stopped   - f/u cultures  -  Sepsis, pulmonary source.  -  Query temporal arteritis.     - s/p 3 days of solumedrol 500 q12 on 5/25   - s/p bx 5/26 with negative results for temporal arteritis   - esr 140->115   - headache improving   - continue solumedrol 62.5 daily and confirm with neuro about steroid doses  -  Leukocytosis   - trending down   - not requiring pressors   - cultures pending   - on empiric abx coverage  -  Multiple sclerosis with query acute flare   - neuro following   - on tecfidera   - on steroids  -  Anemia.   - remains stable  -  History of seizures.  -  Dysphagia   - Speech therapy following    Stable from an ICU perspective and  will transfer patient to the medical floor today with pulmonary to continue to follow.    Discussed patient condition and risk of morbidity and/or mortality with RN, RT, Pharmacy, Charge nurse / hot rounds, Patient and [facility].    [id number]    Viktoria MONTOYA (Scribe), am scribing for, and in the presence of, Toyin Ness M.D.    Electronically signed by: Viktoria Barajas (Matthewibbronson), 5/31/2017    Toyin MONTOYA M.D. personally performed the services described in this documentation, as scribed by Viktoria Barajas in my presence, and it is both accurate and complete.

## 2017-05-31 NOTE — DIETARY
Nutrition Services: Brief Update  Tube feedings have been discontinued and diet started with a full liquid nectar thick diet.      RD to monitor PO intake - please document % consumed in flowsheet.    RD will continue to monitor.  Encourage PO with patient.  Advance diet and medically feasible.

## 2017-05-31 NOTE — CARE PLAN
Problem: Fluid Volume:  Goal: Will maintain balanced intake and output  Intervention: Monitor, educate, and encourage compliance with therapeutic intake of liquids  Patient taking PO fluids. Encouraged PO water intake as tolerated and monitored diuresis with lasix therapy. Will continue to monitor for s/s fluid balance deficit       Problem: Skin Integrity  Goal: Risk for impaired skin integrity will decrease  Intervention: Assess and monitor skin integrity, appearance and/or temperature  Patient mobile and self turning in chair. Encouraged frequent repositioning and support with pillows as needed.

## 2017-05-31 NOTE — CARE PLAN
Problem: Safety  Goal: Will remain free from injury  Outcome: PROGRESSING AS EXPECTED  Pt standby assist, pt calls for assistance    Problem: Skin Integrity  Goal: Risk for impaired skin integrity will decrease  Outcome: PROGRESSING AS EXPECTED  No skin breakdown present, pt turns self, all pressure points assessed

## 2017-05-31 NOTE — PROGRESS NOTES
[facility] Progress Note      Admit Date: 5/22/2017  ICU Day: 7    Resident(s): Bao Camargo M.D.  Attending: MAHESH DAMON/ Dr. Ness    Date & Time:   5/31/2017   1:10 PM       Patient ID:    Name:             Justa Segovia     YOB: 1960  Age:                 56 y.o.  female   MRN:               [id number]    Diagnosis:    MS flare  Sepsis secondary to pulmonary source/PNA  Acute hypoxic respiratory failure - improving  Unilateral neuralgiform headache  Possible GCA  Fluid overload  Leukocytosis  PAH  Lactic acidosis  Anemia  Dyslipidemia  Depression  Hx of cardiac arrhythmia  Neuropathy of both feet  Hyperglycemia      HPI:  57 yo female with PMHx of multiple sclerosis, DLD, migraine initially admitted for MS flare vs temporal arteritis vs trigeminal neuralgia. Patient was started on steroids and carbamazepine. Surgery was consulted for temporal artery biopsy. Two days after admission, patient spiked fever. She was started on ceftriaxone, doxycycline. Rapid response was called today evening due to worsening respiratory status, sepsis with fever and WBC elevation,  and she was then transferred to ICU 5/26.  Started on vanco, zosyn, sepsis protocol initiated; on HFNC; FULL code.     Consultants:    PMA:   Neurology    Interval Events:    05/30/17: Persistent left temporal pain, improved compared to admission.  Start GI prophylaxis with PPI and PRN tums while on steroids.  VSS.  Diuresing well with lasix, off BIPAP, 4L NC.  Mobilizing without much difficulty.  CXR with some consolidation of RML.  Diet advanced to regular without straws.  Check hepatitis panel, ordered anemia work up.  Transfer to neurology floor, [facility].      PHYSICAL EXAM  Gen: NAD, pleasant and cooperative  HEENT: NC/AT, no scleral icterus, no conjunctival injection. cortrak in place.  Neck: Supple, no lymphadenopathy.  Cardiac: S1S2, RRR, no m/r/g, no JVD.  Respiratory: Decreased breath sounds biltaeral  Abdomen:  BS+, soft, ND, no rebound/guarding, no palpable organomegaly.   Ext: No edema, 2+ DP pulses b/l.  Skin: Warm, dry. No rashes or erythema.   Neuro: AAOx4,  no focal sensory or motor deficits. Left side partial ptosis  Psych: Affect, mood, judgement normal.    Respiratory:     Respiration: 20, Pulse Oximetry: 95 %            Invalid input(s): [id number]    HemoDynamics:  Pulse: 80 Blood Pressure: 115/62 mmHg, NIBP: 118/61 mmHg      Neuro:      Fluids:    Date 05/31/17 0700 - 06/01/17 0659   Shift 1393-8115 [id number] 5196-4296 24 Hour Total   I  N  T  A  K  E   P.O. 400   400      P.O. 400   400    I.V. 160   160      IV Volume (NS) 60   60      IV Piggyback Volume (IV Piggyback) 100   100    Shift Total 560   560   O  U  T  P  U  T   Urine 1200   1200      Indwelling Cathether 1200   1200    Stool          Number of Times Stooled 1 x   1 x    Shift Total 1200   1200   NET -640   -640        Intake/Output Summary (Last 24 hours) at 05/29/17 0947  Last data filed at 05/29/17 0600   Gross per 24 hour   Intake   2430 ml   Output   2425 ml   Net      5 ml       Weight: 76.1 kg (167 lb 12.3 oz)  Body mass index is 30.68 kg/(m^2).    Recent Labs      05/28/17   2319 05/29/17 1007 05/30/17   0400  05/31/17   0523   SODIUM   --   139  139  136   POTASSIUM   --   3.9  3.6  3.8   CHLORIDE   --   102  99  99   CO2   --   27  31  29   BUN   --   30*  26*  20   CREATININE   --   0.76  0.76  0.65   MAGNESIUM  2.2   --    --    --    CALCIUM   --   8.3*  8.2*  8.1*       GI/Nutrition:  Recent Labs      05/29/17 1007 05/30/17   0400  05/31/17   0523   ALTSGPT  255*  173*  141*   ASTSGOT  129*  53*  70*   ALKPHOSPHAT  129*  112*  101*   TBILIRUBIN  0.5  0.5  0.5   DBILIRUBIN   --   0.1   --    GLUCOSE  180*  139*  149*       Heme:  Recent Labs      05/29/17 1007 05/30/17   0400  05/31/17   0523   RBC  3.34*  3.36*  3.45*   HEMOGLOBIN  10.6*  10.6*  10.9*   HEMATOCRIT  31.6*  31.6*  32.6*   PLATELETCT  313  372 477        Infectious Disease:  Temp  Av.9 °C (98.4 °F)  Min: 36.8 °C (98.2 °F)  Max: 37 °C (98.6 °F)  Recent Labs      17   1007  17   0400  17   0523   WBC  14.1*  11.3*  8.0   NEUTSPOLYS  89.90*  87.80*  86.00*   LYMPHOCYTES  3.30*  5.20*  7.20*   MONOCYTES  1.80  1.60  2.50   EOSINOPHILS  0.10  0.70  0.50   BASOPHILS  0.40  0.40  0.40   ASTSGOT  129*  53*  70*   ALTSGPT  255*  173*  141*   ALKPHOSPHAT  129*  112*  101*   TBILIRUBIN  0.5  0.5  0.5       Meds:  • famotidine  20 mg     • calcium carbonate  1,000 mg     • potassium chloride SA  40 mEq      Or   • potassium chloride  40 mEq     • benzocaine-menthol  1 Lozenge     • furosemide  20 mg     • carbamazepine  200 mg     • methylPREDNISolone  62.5 mg     • ketorolac  30 mg     • hydromorphone  0.5-2 mg     • Non Formulary Request  240 mg     • calcium carbonate  500 mg     • oxycodone immediate-release  5 mg     • doxycycline monohydrate  100 mg     • acetaminophen  650 mg     • albuterol  2 Puff     • acetaminophen (TYLENOL) suppository  650 mg     • piperacillin-tazobactam  4.5 g Stopped (17 1100)   • insulin lispro  2-9 Units     • senna-docusate  2 Tab      And   • polyethylene glycol/lytes  1 Packet      And   • magnesium hydroxide  30 mL      And   • bisacodyl  10 mg     • Respiratory Care per Protocol       • enoxaparin  40 mg     • pregabalin  75 mg     • sertraline  50 mg     • tizanidine  2 mg     • oxybutynin SR  10 mg     • ondansetron  4 mg     • ondansetron  4 mg     • promethazine  12.5-25 mg     • promethazine  12.5-25 mg     • prochlorperazine  5-10 mg     • pravastatin  40 mg     • metoprolol  25 mg          Problem and Plan:    Sepsis secondary to pulmonary source/PNA  Acute hypoxic respiratory failure - improving  Pneumonia   - possibly secondary to aspiration PNA vs unlikely vasculitis  - initially SIRS: 3(fever, tachypnea, leukocytosis), lactic acid : 3.8. BP and HR stable  - Micro work up negative   - Blood cx  17: NGTD. AB.45/26/58.5; on HFNC   - influenza PCR NGT;   sputum C&S negative    - Echo : moderate PAH (RVSP 50mm), EF 60%  - ABX: initiated 17 end date    - Doxy/Zosyn    Plan:  - cont zosyn, (mrsa swab (-), vanco stopped) doxycycline PO; finishes 7 day course    - solumedrol 62.5 BID   - Discuss with neurologist if can de-escalate to PO or just QD IV steroids.  - lasix to  20 mg IV BID for pulmonary congestion/fluid overload.   - RT/O2 protocol    Unilateral neuralgiform headache  Tic [place] vs temporal arteritis  - New onset, localized primarily to LEFT fronto-temporal region, mod-severe, sharp, intermittent  - Presentation on admission was worrisome for temporal arteritis vs other neurogenic etiology  - Initial work-up:    -  (140 at [facility]), CRP 48     - CT Head: No acute intracranial abnormality   - MRI C/L-spine/brain: Evidence chronic demyelinating plaques of MS   - Biopsy negative for GCA, but prior treatment with steroids may result in false negative.     Plan:  - continue carbamezapine at current dose 200 mg TID  - Continue steroids, discuss transition to PO vs. Reduced frequency of IV steroids.       Fluid overload  -non collapsable IVC of Echo, leg puffines;  -etiology include steroids vs preexisting PAH and RV disfunction  -decrease lasix 20 mg IV BID, CTM I/O  - KCl suppl    PAH  -question preexisitng PAH vs acute secondary to acute lung process  -cont current mgm as above      Elevated lactic acid  -likely due to sepsis  -trended down     Elevated liver enzymes, mild  - patient denies abdominal symptoms  - closely monitor as patient is on carbamazepine  - US abdomen without acute abnormality    Plan:  - Check hepatitis panel tomorrow AM labs.  - Avoid hepatotoxic agents    - Improving, monitor.    ?MS flare  Multiple sclerosis   - Diagnosed 34 years ago and followed by [facility] (Dr. Asmita Pollard)  - Has had hx of flare requiring high dose IV  Steroids x5 days, chronic urinary incontinence  - Neuro consulted; received IV Solumedrol 500mg BID, Tecfidera  - on Oxybutynin for incontinence; Schmitt in for critical condition  - neurology on board      Anemia, normocytic  - Anemia work up ordered, FOBT, Retics, Iron studies, Ferritin, Folate/B12.    Hyperlipidemia  - Cont Pravastatin 40mg QD    Depression  - Cont Sertraline 50mg QD    History of cardiac arrhythmia  -  cont home Metoprolol 25mg BID (with holding parameters if HR < 50)    Hyperglycemia  - Likely exacerbated by IV Steroids  - ISS, Hypoglycemic protocol, POC     Neuropathy of both feet  - Cont Lyrica 75mg BID, Tizanidine 2mg BID    DISPO:Transfer to neurology floor, [facility]      CODE STATUS: Full code    Quality Measures:  Schmitt Catheter:yes  DVT Prophylaxis: Lovenox  Ulcer Prophylaxis:  Antibiotics: zosyn/doxy, end date 05/31      Procedures:  Temporal artery biospy: 5/26    Imaging:    DX-CHEST-PORTABLE (1 VIEW)   Final Result      Stable bilateral basilar infiltrates and pleural effusions.      US-ABDOMEN COMPLETE SURVEY   Final Result      No gallstones or dilatation of the biliary tree.   Mild splenomegaly.   No free fluid.      DX-CHEST-LIMITED (1 VIEW)   Final Result      Perihilar and bibasilar opacities are not significantly changed compared to prior.      Layering pleural effusions are not excluded.      DX-CHEST-PORTABLE (1 VIEW)   Final Result      Persistent bilateral midlung and lower lobe areas of edema or pneumonia with slight worsening in the left lower lobe.      DX-CHEST-PORTABLE (1 VIEW)   Final Result      1.  Stable bilateral mid and lower lung airspace opacities consistent with edema and/or infection.   2.  Small right pleural effusion.   3.  Interval placement of an enteric feeding tube which courses towards the stomach with the distal tip not visualized.      ECHOCARDIOGRAM COMP W/O CONT   Final Result      DX-ABDOMEN FOR TUBE PLACEMENT   Final Result       Feeding tube tip projects over the expected location the gastric antrum.      DX-CHEST-PORTABLE (1 VIEW)   Final Result      Increasing bilateral pulmonary consolidation.      DX-CHEST-LIMITED (1 VIEW)   Final Result      1.  Patchy bilateral interstitial opacities are suggestive of atypical infection. Findings have worsened from the prior exam.      2.  Questionable small bilateral pleural effusions.         MR-LUMBAR SPINE-WITH & W/O   Final Result      1.  There are tiny focal intramedullary T2 signal intensities in the lower end of the thoracic spinal cord likely representing chronic demyelinating plaques of multiple sclerosis.   2.  Mild chronic compression deformity at L1.      MR-CERVICAL SPINE-WITH & W/O   Final Result      1.  Abnormal intramedullary T2 signal intensity in the cervical spinal cord likely representing chronic demyelinating plaques of multiple sclerosis. None of the lesions demonstrates contrast enhancement.   2.  Minimal degenerative disease as described above.      MR-BRAIN-WITH & W/O   Final Result      1.  Multifocal abnormal T2 hyperintensities in the subcortical and periventricular white matter. None of the lesions demonstrates contrast enhancement. In view of history of multiple sclerosis this findings likely represents chronic demyelinating plaques    of multiple sclerosis.   2.  Mild cerebral atrophy.      DX-CHEST-PORTABLE (1 VIEW)   Final Result      1.  Medial RIGHT lung base and greater atelectasis.   2.  No pleural fluid or pneumothorax.      CT-HEAD W/O   Final Result      No acute intracranial abnormality.

## 2017-05-31 NOTE — PROGRESS NOTES
HA is much better, minimal pain.  Still has pulmonary issues.  Continue steroid taper.  I can follow as opt.

## 2017-06-01 VITALS
TEMPERATURE: 98.6 F | DIASTOLIC BLOOD PRESSURE: 59 MMHG | BODY MASS INDEX: 30.35 KG/M2 | RESPIRATION RATE: 20 BRPM | HEART RATE: 76 BPM | SYSTOLIC BLOOD PRESSURE: 90 MMHG | OXYGEN SATURATION: 95 % | WEIGHT: 164.9 LBS | HEIGHT: 62 IN

## 2017-06-01 PROBLEM — R73.9 HYPERGLYCEMIA: Status: RESOLVED | Noted: 2017-05-24 | Resolved: 2017-06-01

## 2017-06-01 PROBLEM — J96.00 RESPIRATORY FAILURE, ACUTE (HCC): Status: RESOLVED | Noted: 2017-05-25 | Resolved: 2017-06-01

## 2017-06-01 LAB
ALBUMIN SERPL BCP-MCNC: 2.4 G/DL (ref 3.2–4.9)
ALBUMIN/GLOB SERPL: 0.9 G/DL
ALP SERPL-CCNC: 92 U/L (ref 30–99)
ALT SERPL-CCNC: 119 U/L (ref 2–50)
ANION GAP SERPL CALC-SCNC: 7 MMOL/L (ref 0–11.9)
AST SERPL-CCNC: 46 U/L (ref 12–45)
BACTERIA BLD CULT: NORMAL
BACTERIA BLD CULT: NORMAL
BASOPHILS # BLD AUTO: 0.3 % (ref 0–1.8)
BASOPHILS # BLD: 0.02 K/UL (ref 0–0.12)
BILIRUB SERPL-MCNC: 0.4 MG/DL (ref 0.1–1.5)
BUN SERPL-MCNC: 20 MG/DL (ref 8–22)
CALCIUM SERPL-MCNC: 8.4 MG/DL (ref 8.5–10.5)
CHLORIDE SERPL-SCNC: 101 MMOL/L (ref 96–112)
CO2 SERPL-SCNC: 30 MMOL/L (ref 20–33)
CREAT SERPL-MCNC: 0.74 MG/DL (ref 0.5–1.4)
EOSINOPHIL # BLD AUTO: 0.09 K/UL (ref 0–0.51)
EOSINOPHIL NFR BLD: 1.4 % (ref 0–6.9)
ERYTHROCYTE [DISTWIDTH] IN BLOOD BY AUTOMATED COUNT: 46.6 FL (ref 35.9–50)
FOLATE SERPL-MCNC: 14.8 NG/ML
GFR SERPL CREATININE-BSD FRML MDRD: >60 ML/MIN/1.73 M 2
GLOBULIN SER CALC-MCNC: 2.8 G/DL (ref 1.9–3.5)
GLUCOSE BLD-MCNC: 101 MG/DL (ref 65–99)
GLUCOSE SERPL-MCNC: 112 MG/DL (ref 65–99)
HAV IGM SERPL QL IA: NEGATIVE
HBV CORE IGM SER QL: NEGATIVE
HBV SURFACE AG SER QL: NEGATIVE
HCT VFR BLD AUTO: 33.6 % (ref 37–47)
HCV AB SER QL: NEGATIVE
HGB BLD-MCNC: 11.3 G/DL (ref 12–16)
HGB RETIC QN AUTO: 36.5 PG/CELL (ref 29–35)
IMM GRANULOCYTES # BLD AUTO: 0.26 K/UL (ref 0–0.11)
IMM GRANULOCYTES NFR BLD AUTO: 4 % (ref 0–0.9)
IMM RETICS NFR: 40.3 % (ref 9.3–17.4)
IRON SATN MFR SERPL: 39 % (ref 15–55)
IRON SERPL-MCNC: 80 UG/DL (ref 40–170)
LYMPHOCYTES # BLD AUTO: 0.78 K/UL (ref 1–4.8)
LYMPHOCYTES NFR BLD: 12.1 % (ref 22–41)
MCH RBC QN AUTO: 32.1 PG (ref 27–33)
MCHC RBC AUTO-ENTMCNC: 33.6 G/DL (ref 33.6–35)
MCV RBC AUTO: 95.5 FL (ref 81.4–97.8)
MONOCYTES # BLD AUTO: 0.25 K/UL (ref 0–0.85)
MONOCYTES NFR BLD AUTO: 3.9 % (ref 0–13.4)
NEUTROPHILS # BLD AUTO: 5.07 K/UL (ref 2–7.15)
NEUTROPHILS NFR BLD: 78.3 % (ref 44–72)
NRBC # BLD AUTO: 0 K/UL
NRBC BLD AUTO-RTO: 0 /100 WBC
PLATELET # BLD AUTO: 391 K/UL (ref 164–446)
PMV BLD AUTO: 10.5 FL (ref 9–12.9)
POTASSIUM SERPL-SCNC: 4.3 MMOL/L (ref 3.6–5.5)
PROT SERPL-MCNC: 5.2 G/DL (ref 6–8.2)
RBC # BLD AUTO: 3.52 M/UL (ref 4.2–5.4)
RETICS # AUTO: 0.05 M/UL (ref 0.04–0.06)
RETICS/RBC NFR: 1.4 % (ref 0.8–2.1)
SIGNIFICANT IND 70042: NORMAL
SIGNIFICANT IND 70042: NORMAL
SITE SITE: NORMAL
SITE SITE: NORMAL
SODIUM SERPL-SCNC: 138 MMOL/L (ref 135–145)
SOURCE SOURCE: NORMAL
SOURCE SOURCE: NORMAL
TIBC SERPL-MCNC: 203 UG/DL (ref 250–450)
VIT B12 SERPL-MCNC: >1500 PG/ML (ref 211–911)
WBC # BLD AUTO: 6.5 K/UL (ref 4.8–10.8)

## 2017-06-01 PROCEDURE — 83540 ASSAY OF IRON: CPT

## 2017-06-01 PROCEDURE — A9270 NON-COVERED ITEM OR SERVICE: HCPCS | Performed by: INTERNAL MEDICINE

## 2017-06-01 PROCEDURE — 82607 VITAMIN B-12: CPT

## 2017-06-01 PROCEDURE — 700102 HCHG RX REV CODE 250 W/ 637 OVERRIDE(OP): Performed by: INTERNAL MEDICINE

## 2017-06-01 PROCEDURE — 80074 ACUTE HEPATITIS PANEL: CPT

## 2017-06-01 PROCEDURE — 700102 HCHG RX REV CODE 250 W/ 637 OVERRIDE(OP): Performed by: STUDENT IN AN ORGANIZED HEALTH CARE EDUCATION/TRAINING PROGRAM

## 2017-06-01 PROCEDURE — G8997 SWALLOW GOAL STATUS: HCPCS | Mod: CH

## 2017-06-01 PROCEDURE — 97535 SELF CARE MNGMENT TRAINING: CPT

## 2017-06-01 PROCEDURE — G8980 MOBILITY D/C STATUS: HCPCS | Mod: CI

## 2017-06-01 PROCEDURE — 700102 HCHG RX REV CODE 250 W/ 637 OVERRIDE(OP): Performed by: SPECIALIST

## 2017-06-01 PROCEDURE — 82746 ASSAY OF FOLIC ACID SERUM: CPT

## 2017-06-01 PROCEDURE — A9270 NON-COVERED ITEM OR SERVICE: HCPCS | Performed by: HOSPITALIST

## 2017-06-01 PROCEDURE — 700102 HCHG RX REV CODE 250 W/ 637 OVERRIDE(OP): Performed by: HOSPITALIST

## 2017-06-01 PROCEDURE — 97162 PT EVAL MOD COMPLEX 30 MIN: CPT

## 2017-06-01 PROCEDURE — G8979 MOBILITY GOAL STATUS: HCPCS | Mod: CI

## 2017-06-01 PROCEDURE — A9270 NON-COVERED ITEM OR SERVICE: HCPCS | Performed by: STUDENT IN AN ORGANIZED HEALTH CARE EDUCATION/TRAINING PROGRAM

## 2017-06-01 PROCEDURE — 80053 COMPREHEN METABOLIC PANEL: CPT

## 2017-06-01 PROCEDURE — 83550 IRON BINDING TEST: CPT

## 2017-06-01 PROCEDURE — 700111 HCHG RX REV CODE 636 W/ 250 OVERRIDE (IP): Performed by: STUDENT IN AN ORGANIZED HEALTH CARE EDUCATION/TRAINING PROGRAM

## 2017-06-01 PROCEDURE — A9270 NON-COVERED ITEM OR SERVICE: HCPCS | Performed by: SPECIALIST

## 2017-06-01 PROCEDURE — 99232 SBSQ HOSP IP/OBS MODERATE 35: CPT | Performed by: INTERNAL MEDICINE

## 2017-06-01 PROCEDURE — G8978 MOBILITY CURRENT STATUS: HCPCS | Mod: CI

## 2017-06-01 PROCEDURE — 85046 RETICYTE/HGB CONCENTRATE: CPT

## 2017-06-01 PROCEDURE — 700111 HCHG RX REV CODE 636 W/ 250 OVERRIDE (IP): Performed by: INTERNAL MEDICINE

## 2017-06-01 PROCEDURE — G8998 SWALLOW D/C STATUS: HCPCS | Mod: CH

## 2017-06-01 PROCEDURE — 85025 COMPLETE CBC W/AUTO DIFF WBC: CPT

## 2017-06-01 PROCEDURE — 82962 GLUCOSE BLOOD TEST: CPT

## 2017-06-01 RX ORDER — ONDANSETRON 4 MG/1
4 TABLET, ORALLY DISINTEGRATING ORAL EVERY 4 HOURS PRN
Qty: 40 TAB | Refills: 0 | Status: SHIPPED | OUTPATIENT
Start: 2017-06-01 | End: 2019-11-17

## 2017-06-01 RX ORDER — PREDNISONE 20 MG/1
TABLET ORAL
Qty: 40 TAB | Refills: 0 | Status: SHIPPED | OUTPATIENT
Start: 2017-06-01 | End: 2019-11-17

## 2017-06-01 RX ADMIN — CARBAMAZEPINE 200 MG: 200 TABLET ORAL at 16:30

## 2017-06-01 RX ADMIN — Medication 500 MG: at 06:13

## 2017-06-01 RX ADMIN — POTASSIUM CHLORIDE 40 MEQ: 1500 TABLET, EXTENDED RELEASE ORAL at 09:42

## 2017-06-01 RX ADMIN — METHYLPREDNISOLONE SODIUM SUCCINATE 62.5 MG: 125 INJECTION, POWDER, FOR SOLUTION INTRAMUSCULAR; INTRAVENOUS at 09:41

## 2017-06-01 RX ADMIN — BENZOCAINE AND MENTHOL 1 LOZENGE: 15; 3.6 LOZENGE ORAL at 06:17

## 2017-06-01 RX ADMIN — TIZANIDINE 2 MG: 4 TABLET ORAL at 09:42

## 2017-06-01 RX ADMIN — ACETAMINOPHEN 650 MG: 325 TABLET, FILM COATED ORAL at 16:30

## 2017-06-01 RX ADMIN — ENOXAPARIN SODIUM 40 MG: 100 INJECTION SUBCUTANEOUS at 09:41

## 2017-06-01 RX ADMIN — ACETAMINOPHEN 650 MG: 325 TABLET, FILM COATED ORAL at 07:57

## 2017-06-01 RX ADMIN — FAMOTIDINE 20 MG: 20 TABLET, FILM COATED ORAL at 09:42

## 2017-06-01 RX ADMIN — CARBAMAZEPINE 200 MG: 200 TABLET ORAL at 09:42

## 2017-06-01 RX ADMIN — FUROSEMIDE 20 MG: 10 INJECTION, SOLUTION INTRAMUSCULAR; INTRAVENOUS at 06:13

## 2017-06-01 RX ADMIN — PREGABALIN 75 MG: 75 CAPSULE ORAL at 09:42

## 2017-06-01 ASSESSMENT — PAIN SCALES - GENERAL
PAINLEVEL_OUTOF10: 0

## 2017-06-01 ASSESSMENT — COGNITIVE AND FUNCTIONAL STATUS - GENERAL
CLIMB 3 TO 5 STEPS WITH RAILING: A LITTLE
MOBILITY SCORE: 23
SUGGESTED CMS G CODE MODIFIER MOBILITY: CI

## 2017-06-01 ASSESSMENT — GAIT ASSESSMENTS
DEVIATION: BRADYKINETIC
ASSISTIVE DEVICE: FRONT WHEEL WALKER
DISTANCE (FEET): 150
GAIT LEVEL OF ASSIST: SUPERVISED

## 2017-06-01 ASSESSMENT — LIFESTYLE VARIABLES: EVER_SMOKED: NEVER

## 2017-06-01 NOTE — DISCHARGE PLANNING
"Melquiades states that a \"resp dx\" other than hypoxemia or pneumonia is required by [facility] to provide home 02. They require an addendum to H&P documenting that MS caused the respfailure. Discussed with pt and spouse, they have decided to pay privately for 02 now and appeal to [facility] later. Notified Melquiades, they will deliver 02 to pt here by 1630 and bill pt.   "

## 2017-06-01 NOTE — CARE PLAN
Problem: Mobility  Goal: Risk for activity intolerance will decrease  Outcome: PROGRESSING AS EXPECTED  Pt ambulated multiple times over shift, getting stronger and more independent. PT/OT consult placed today.

## 2017-06-01 NOTE — THERAPY
"Physical Therapy Evaluation completed.   Bed Mobility:  Supine to Sit:  (Nt, in chair pre/post; denies deficits )  Transfers: Sit to Stand: Supervised (with FWW )  Gait: Level Of Assist: Supervised with Front-Wheel Walker       Plan of Care: Patient with no further skilled PT needs in the acute care setting at this time  Discharge Recommendations: Equipment: No Equipment Needed Has walker at home     Pt presents with imparied activity tolerance, endurance and LE endurance associated with acute on chronic illness. Pt is very knowledgeable about fatigue in setting of MS, discussed limitations and goals for aerobic exercise in setting of recoverying PNA. Pt feels confident with abiltiies to return home in care of her partner, they both have a medical background and feel confident in patient's abiltiies to monitor 02 at home with new supplemental 02 needs. No further acute PT needs identified, functionally capable of d/c home when medically appropriate to do so. Anticipate no PT needs at discharge.     See \"Rehab Therapy-Acute\" Patient Summary Report for complete documentation.     "

## 2017-06-01 NOTE — PROGRESS NOTES
Alert, minimal HA.  PERRL, facial motion is =.  Tic vs temporal arteritis.  On 62.5 mg methylprednesolone as of today.  You could d/c her on prednisone 80mg/d to taper by 20mg q 4d and have her f/u with me next week.

## 2017-06-01 NOTE — PROGRESS NOTES
Patient discharged home with wife via wheelchair. Discharge instructions given, patient assessed for need of DME and home O2. Given medication list for home medications, ensured patient follow up was scheduled for Dr. Guerrero and PCP. Instructed to seek care if symptoms worsen. Patient left with all belongings except those lost earlier in hospitalization. Security aware and reviewed lost belongings prior to discharge. VSS, all LDA's removed prior to discharge home.    Discharge Instructions    Discharged to home by car with relative. Discharged via wheelchair, hospital escort: Yes.  Special equipment needed: Oxygen, walker    Be sure to schedule a follow-up appointment with your primary care doctor or any specialists as instructed.     Discharge Plan:   Diet Plan: Discussed  Activity Level: Discussed  Confirmed Follow up Appointment: Patient to Call and Schedule Appointment  Confirmed Symptoms Management: Discussed  Influenza Vaccine Indication: Not indicated: Previously immunized this influenza season and > 8 years of age    I understand that a diet low in cholesterol, fat, and sodium is recommended for good health. Unless I have been given specific instructions below for another diet, I accept this instruction as my diet prescription.   Other diet: N/A    Special Instructions: None    · Is patient discharged on Warfarin / Coumadin?   No     · Is patient Post Blood Transfusion?  No    Depression / Suicide Risk    As you are discharged from this [facility] facility, it is important to learn how to keep safe from harming yourself.    Recognize the warning signs:  · Abrupt changes in personality, positive or negative- including increase in energy   · Giving away possessions  · Change in eating patterns- significant weight changes-  positive or negative  · Change in sleeping patterns- unable to sleep or sleeping all the time   · Unwillingness or inability to communicate  · Depression  · Unusual sadness,  discouragement and loneliness  · Talk of wanting to die  · Neglect of personal appearance   · Rebelliousness- reckless behavior  · Withdrawal from people/activities they love  · Confusion- inability to concentrate     If you or a loved one observes any of these behaviors or has concerns about self-harm, here's what you can do:  · Talk about it- your feelings and reasons for harming yourself  · Remove any means that you might use to hurt yourself (examples: pills, rope, extension cords, firearm)  · Get professional help from the community (Mental Health, Substance Abuse, psychological counseling)  · Do not be alone:Call your Safe Contact- someone whom you trust who will be there for you.  · Call your local CRISIS HOTLINE 951-6568 or 828-431-3230  · Call your local Children's Mobile Crisis Response Team [place] (623) 033-6280 or www.[employer]  · Call the toll free National Suicide Prevention Hotlines   · National Suicide Prevention Lifeline 231-083-SMPB (7930)  · National Hope Line Network 800-SUICIDE (541-8080)

## 2017-06-01 NOTE — DISCHARGE PLANNING
Received choice form from Pat(ELIZABETH), however, patient needs DME order. Pat(ELIZABETH) notified via phone.

## 2017-06-01 NOTE — THERAPY
"Speech Language Therapy:   Functional Status:  fxnl swallow for a regular diet; noted during breakfast meal this morning.  Recommendations: No further SLP needed  Plan of Care: Patient with no further skilled SLP needs in the acute care setting at this time  Post-Acute Therapy: Currently anticipate no further skilled therapy needs once patient is discharged from the inpatient setting.    See \"Rehab Therapy-Acute\" Patient Summary Report for complete documentation.     "

## 2017-06-01 NOTE — CARE PLAN
Problem: Safety  Goal: Will remain free from falls  Outcome: PROGRESSING AS EXPECTED  Bed alarm on, call light within reach. Pt calls appropriately and instructed not to get up without staff.

## 2017-06-01 NOTE — DISCHARGE PLANNING
[facility]  DME referral for oxygen sent to [facility] at 1244.

## 2017-06-01 NOTE — PROGRESS NOTES
Pulmonary Critical Care Progress Note      Date of service: 6/1/2017    Chief Complaint: Multiple sclerosis flare up.     History of Present Illness: This is a 56-year-old lady who was admitted to [facility] on or about 05/22/2017.  She presented with an elevated Westergren sed rate, headache, nausea and vomiting.  She was felt to possibly have temporal arteritis.  She has a  history of multiple sclerosis.  She was started on high dose methylprednisolone.  She was seen by neurology.  The plan is for her to have a temporal artery biopsy tomorrow.  Today, I was called because of tachypnea, fever and increasing oxygen requirements.  She has now been transferred to the intensive care unit.    ROS:    Respiratory: positive cough, negative shortness of breath and negative sputum production,   Cardiac: negative chest pain,   GI: negative abdominal pain.    All other systems negative.    Interval Events:  24 hour interval history reviewed   Slept for 6 hours straight.  Alert and oriented x4.   Received Tylenol  For headache yesterday.   SR, BP  systolic.   Received Lasix yesterday. Adequate urine output.   Ambulated around the unit 5-6x with front wheeled walker.   No CXR today.     Plan:  Home O2 consult  Physical therapy to administer front wheeled walker.  Discharge home and follow up with outpatient.  D/C holt    Yesterday's Events:  Left temporal pain present, but not as severe as prior.   Gagging relieved.  SR, BP OK.  Self-diuresing well with Lasix.  On 4L NC, off BiPAP.   Mobilizing well.   CXR with bilateral pleural effusions and minimal consolidation to right middle lobe.       PFSH:  No change.    Physical Exam:  General:  Talkative.  HEENT:  PERRL, EOMI, conjunctiva pink, sclera anicteric.   CVS:  Regular rate and rhythm.  Respiratory:  Clear lungs throughout, no wheezing.  Abdomen:  BS present.   Extremities:  Moving all extremities spontaneously. 2+ radial pulses.   Neuro/Psych:   Symmetrical facial expressions, able to ambulate to chair from bed.      Respiratory:     Pulse Oximetry: 95 %  ImagingAvailable data reviewed         Invalid input(s): [id number]    HemoDynamics:  Pulse: 85  NIBP: (!) 92/49 mmHg    Imaging: Available data reviewed   Echo 5/26 - EF 60%, RVSP 48-50      Neuro:  GCS Total Ty Coma Score: 15  Imaging: Available data reviewed    Fluids:  Intake/Output       05/30/17 0700 - 05/31/17 0659 05/31/17 0700 - 06/01/17 0659 06/01/17 0700 - 06/02/17 0659      [id number] 4701-8106 Total 5226-6254 5228-5184 Total 7818-9023 7652-8100 Total       Intake    P.O.  600  420 1020  770  -- 770  --  -- --    P.O.  770 -- 770 -- -- --    I.V.  320  320 640  320  -- 320  --  -- --    IV Volume (NS) 120 120 240 120 -- 120 -- -- --    IV Piggyback Volume (IV Piggyback) 200 200 400 200 -- 200 -- -- --    Other  150  60 210  --  -- --  --  -- --    Medications (P.O./ Enteral Liquids) 150 60 210 -- -- -- -- -- --    Enteral  190  -- 190  --  -- --  --  -- --    Enteral Volume 70 -- 70 -- -- -- -- -- --    Free Water / Tube Flush 120 -- 120 -- -- -- -- -- --    Total Intake 9546 253 9263 1090 -- 1090 -- -- --       Output    Urine  2500  800 3300  3000  -- 3000  --  -- --    Indwelling Cathether 2500 800 3300 3000 -- 3000 -- -- --    Stool  --  -- --  --  -- --  --  -- --    Number of Times Stooled 1 x 3 x 4 x 1 x -- 1 x -- -- --    Total Output 2500 800 3300 3000 -- 3000 -- -- --       Net I/O     -1240 0 -1240 -1910 -- -1910 -- -- --        Weight: 76.1 kg (167 lb 12.3 oz)  Recent Labs      05/29/17   1007  05/30/17   0400  05/31/17   0523   SODIUM  139  139  136   POTASSIUM  3.9  3.6  3.8   CHLORIDE  102  99  99   CO2  27  31  29   BUN  30*  26*  20   CREATININE  0.76  0.76  0.65   CALCIUM  8.3*  8.2*  8.1*     GI/Nutrition:  Imaging: Available data reviewed  NPO and tube feed Tolerated     Liver Function:  Recent Labs      05/29/17   1007  05/30/17   0400  05/31/17   0523    ALTSGPT  255*  173*  141*   ASTSGOT  129*  53*  70*   ALKPHOSPHAT  129*  112*  101*   TBILIRUBIN  0.5  0.5  0.5   DBILIRUBIN   --   0.1   --    GLUCOSE  180*  139*  149*     Heme:  Recent Labs      17   0400  17   0523   RBC  3.34*  3.36*  3.45*   HEMOGLOBIN  10.6*  10.6*  10.9*   HEMATOCRIT  31.6*  31.6*  32.6*   PLATELETCT  313  328  356     Infectious Disease:  Temp  Av.9 °C (98.5 °F)  Min: 36.8 °C (98.2 °F)  Max: 37.1 °C (98.8 °F)  Micro: reviewed     Abx:  Doxycycline   Vancomycin stopped   Zosyn    bcx  ngtd  bcx  ngtd  MRSA negative  Flu (-)   Recent Labs      17   0400  17   0523   WBC  14.1*  11.3*  8.0   NEUTSPOLYS  89.90*  87.80*  86.00*   LYMPHOCYTES  3.30*  5.20*  7.20*   MONOCYTES  1.80  1.60  2.50   EOSINOPHILS  0.10  0.70  0.50   BASOPHILS  0.40  0.40  0.40   ASTSGOT  129*  53*  70*   ALTSGPT  255*  173*  141*   ALKPHOSPHAT  129*  112*  101*   TBILIRUBIN  0.5  0.5  0.5     Current Facility-Administered Medications   Medication Dose Frequency Provider Last Rate Last Dose   • famotidine (PEPCID) tablet 20 mg  20 mg BID Bao Camargo M.D.   20 mg at 17 2110   • calcium carbonate (TUMS) chewable tab 1,000 mg  1,000 mg 4X/DAY PRN Bao Camargo M.D.   1,000 mg at 17 1119   • potassium chloride SA (Kdur) tablet 40 mEq  40 mEq DAILY Vikash Warren M.D.   40 mEq at 17 1001    Or   • potassium chloride (KLOR-CON) 20 MEQ packet 40 mEq  40 mEq DAILY Vikash Warren M.D.       • methylPREDNISolone sod succ (SOLU-MEDROL) 125 MG injection 62.5 mg  62.5 mg DAILY Bao Camargo M.D.       • benzocaine-menthol (CEPACOL) lozenge 1 Lozenge  1 Lozenge Q2HRS PRN Toyin Ness M.D.   1 Lozenge at 17 1627   • furosemide (LASIX) injection 20 mg  20 mg BID DIURETIC Nba Samuel M.D.   20 mg at 17 1533   • carbamazepine (TEGRETOL) tablet 200 mg  200 mg TID G Louisa Guerrero M.D.   200 mg at 17 2107    • ketorolac (TORADOL) injection 30 mg  30 mg Q6HRS PRN Jonny Laws M.D.   30 mg at 05/28/17 2356   • HYDROmorphone (DILAUDID) injection 0.5-2 mg  0.5-2 mg Q3HRS PRN Jonny Laws M.D.       • Dimethyl fumarate (TECFIDERA) 240 mg Capsules  240 mg BID VENUS Guerrero M.D.   240 mg at 05/31/17 2100   • calcium carbonate (OS-JAYESH 500) tablet 500 mg  500 mg BID WITH MEALS Jinny MORALES M.D.   500 mg at 05/31/17 1710   • oxycodone immediate-release (ROXICODONE) tablet 5 mg  5 mg Q6HRS PRN Vikash Warren M.D.   5 mg at 05/28/17 1431   • acetaminophen (TYLENOL) tablet 650 mg  650 mg Q4HRS PRN Vikash Warren M.D.   650 mg at 05/31/17 2110   • albuterol inhaler 2 Puff  2 Puff Q2HRS PRN Navin Cosme M.D.   2 Puff at 05/25/17 1946   • acetaminophen (TYLENOL) suppository 650 mg  650 mg Q6HRS PRN Tommy García M.D.   Stopped at 05/26/17 1135   • insulin lispro (HUMALOG) injection 2-9 Units  2-9 Units 4X/DAY SILVANO Em M.D.   Stopped at 05/29/17 1700   • senna-docusate (PERICOLACE or SENOKOT S) 8.6-50 MG per tablet 2 Tab  2 Tab BID Ree Fraizer M.D.   Stopped at 05/29/17 0900    And   • polyethylene glycol/lytes (MIRALAX) PACKET 1 Packet  1 Packet QDAY PRN Ree Frazier M.D.   Stopped at 05/26/17 0903    And   • magnesium hydroxide (MILK OF MAGNESIA) suspension 30 mL  30 mL QDAY PRN Ree Frazier M.D.   30 mL at 05/26/17 0910    And   • bisacodyl (DULCOLAX) suppository 10 mg  10 mg QDAY PRN Ree Frazier M.D.   10 mg at 05/26/17 2141   • Respiratory Care per Protocol   Continuous RT Ree Frazier M.D.       • enoxaparin (LOVENOX) inj 40 mg  40 mg DAILY Ree Frazier M.D.   40 mg at 05/31/17 0946   • pregabalin (LYRICA) capsule 75 mg  75 mg BID Ree Frazier M.D.   75 mg at 05/31/17 2110   • sertraline (ZOLOFT) tablet 50 mg  50 mg QHS Ree Frazier M.D.   50 mg at 05/31/17 2109   • tizanidine (ZANAFLEX) tablet 2 mg  2 mg BID Ree Frazier M.D.   2 mg at 05/31/17 2109   • oxybutynin SR  (DITROPAN-XL) tablet 10 mg  10 mg Q EVENING Ree Frazier M.D.   10 mg at 05/31/17 2110   • ondansetron (ZOFRAN) syringe/vial injection 4 mg  4 mg Q4HRS PRN Ree Frazier M.D.   4 mg at 05/31/17 1709   • ondansetron (ZOFRAN ODT) dispertab 4 mg  4 mg Q4HRS PRN Ree Frazier M.D.       • promethazine (PHENERGAN) tablet 12.5-25 mg  12.5-25 mg Q4HRS PRN Ree Frazier M.D.       • promethazine (PHENERGAN) suppository 12.5-25 mg  12.5-25 mg Q4HRS PRN Ree Frazier M.D.       • prochlorperazine (COMPAZINE) injection 5-10 mg  5-10 mg Q4HRS PRN Ree Frazier M.D.   10 mg at 05/23/17 0557   • pravastatin (PRAVACHOL) tablet 40 mg  40 mg Nightly Ree Frazier M.D.   40 mg at 05/31/17 2110   • metoprolol (LOPRESSOR) tablet 25 mg  25 mg TWICE DAILY Ree Frazier M.D.   Stopped at 05/31/17 2100     Last reviewed on 5/26/2017  5:15 PM by Marisa Rucker R.N.    Quality  Measures:  Labs reviewed, Medications reviewed, Radiology images reviewed and EKG reviewed  Schmitt catheter: Critically Ill - Requiring Accurate Measurement of Urinary Output      DVT Prophylaxis: Enoxaparin (Lovenox)  DVT prophylaxis - mechanical: SCDs  Ulcer prophylaxis: Not indicated        Assessment and Plan:  -  Acute hypoxemic respiratory failure.   - resolving   - Aspiration/Pna +/- fluid   - HFNC -> bipap ->6L and no BiPAP for > 48 hours   - unusual to have pulmonary involvement w GCA/TA   - cont rt/02 protocols   - IS/PEP/IPV as needed   - will need Home O2 eval  -  Pneumonia.   - empiric broad spectrum abx   - mrsa swab (-), vanco stopped   - f/u cultures  -  Sepsis, pulmonary source.  -  Query temporal arteritis.     - s/p 3 days of solumedrol 500 q12 on 5/25   - s/p bx 5/26 with negative results for temporal arteritis   - esr 140->115   - headache improving   - Solumedrol changed to prednisone per neuro   - f/u with neurology (Dr. Guerrero) in 1-2 weeks  -  Leukocytosis   - trending down   - not requiring pressors   - cultures pending   - on empiric  abx coverage  -  Multiple sclerosis with query acute flare   - neuro following   - on tecfidera   - on steroids  -  Anemia.   - remains stable  -  History of seizures.  -  Dysphagia   - Speech therapy following    Stable from an ICU perspective, but also from a hospital perspective.  She will be discharged home on oral prednisone and her home medications.    Discussed patient condition and risk of morbidity and/or mortality with Family, RN, RT, Pharmacy, Charge nurse / hot rounds, Patient and neurology and [facility].    [id number]    Viktoria MONTOYA (Chris), am scribing for, and in the presence of, Toyin Ness M.D.    Electronically signed by: Viktoria Barajas (Chris), 6/1/2017     Toyin MONTOYA M.D. personally performed the services described in this documentation, as scribed by Viktoria Barajas in my presence, and it is both accurate and complete.

## 2017-06-01 NOTE — DISCHARGE PLANNING
Called [facility] to follow up on oxygen referral.  Leigh requesting updated oxygen order to include with exertion/ambulation only.  Sarah(ELIZABETH) notified.

## 2017-06-01 NOTE — DISCHARGE SUMMARY
[facility] Internal Medicine Discharge Summary      Admit Date:  5/22/2017       Discharge Date:   6/1/2017    Service:   [facility] Internal Medicine Gold Team  Attending Physician(s):   Toyin Ness       Senior Resident(s):   Jerel Vences      Primary Diagnosis:   Multiple sclerosis flare  Sepsis secondary to pulmonary source    Secondary Diagnoses:                Principal Problem:    Unilateral neuralgiform headache POA: Unknown  Active Problems:    Multiple sclerosis (CMS-Formerly [facility])      Overview: Pt has hx of MS since the age of 22, started as Seizures managed with       Phenobarbital and diagnosed 10 yrs later as MS      Hx of worst episode 12 yrs ago requiring very high doses of Steroids (1200       mg ) * 5days      Was in remission for 10 yrs      Symptoms recurred since Thursday  (May 18th 2017)      Leg cramps, heaviness of both legs, followed by severe headache unilateral       left side temporal region      Bladder control lost since yesterday          Pneumonia likely 2/2 aspiration    Hyperlipidemia POA: Unknown    Depression POA: Unknown    History of cardiac arrhythmia POA: Unknown    Hyperglycemia POA: Unknown    Neuropathy of both feet    Temporal arteritis (CMS-Formerly [facility]) POA: Unknown    Respiratory failure, acute (CMS-Formerly [facility]) POA: Unknown    Multiple sclerosis exacerbation (CMS-Formerly [facility]) POA: Unknown      Hospital Summary (Brief Narrative):       In brief, this is a very pleasant 56 year old lady who was admitted to [facility] on or 5/22/2017 after being transferred from [facility] for multiple sclerosis flare with associated elevation in her Westergren sedimentation rate. The patient reports that she had been experiencing headache, nausea and vomiting consistent with previous episodes of multiple sclerosis flare, except she felt the headache was more severe and intractable. There initially was suspicion of temporal arteritis, the patient was started on high-dose corticosteroids  for both temporal arteritis and multiple sclerosis flare.  She underwent temporal artery biopsy on 5/26/2017 which disclosed no evidence of giant cell arteritis. She was seen by neurology (Cesar) and corticosteroids were continued. She expressed a brief fever with increasing oxygen requirements and was subsequently transferred to the intensive care unit where she received increased supplemental oxygen and BiPAP for breathing support, as well as a total days of antibiotic therapy intermittently with ceftriaxone, Zosyn, vancomycin, and finally finishing on oral doxycycline.  Her condition continued to improve after her admission to the ICU, and on hospital day 10 she felt her symptoms were nearly resolved and she was highly motivated for discharge home. She will be discharged in alert, ambulatory, stable condition with 16 day prednisone taper per neurology recommendations. She has been counseled extensively to return to the emergency room if her symptoms worsen or do not improve.    Patient /Hospital Summary (Details -- Problem Oriented) :          Unilateral neuralgiform headache (improved)  - New onset, localized primarily to LEFT fronto-temporal region, mod-severe, sharp, intermittent  - Presentation on admission was worrisome for temporal arteritis vs other neurogenic etiology  -  (140 at [facility]), CRP 48    - CT Head: No acute intracranial abnormality  - MRI C/L-spine/brain: Evidence chronic demyelinating plaques of MS  - Biopsy negative for GCA, but prior treatment with steroids may result in false negative.   - continue carbamezapine at current dose 200 mg TID  - Prednisone 16 day taper as above       Shortness of breath associated with pulmonary artery hypertension (improved)   -question preexisitng PAH vs acute secondary to acute lung process  -cont current mgm as above      Elevated liver enzymes, mild (improved)  - patient denies abdominal symptoms  - closely monitor as patient is on  carbamazepine  - US abdomen without acute abnormality  - Shows continued improvement  - Primary care follow-up      Multiple sclerosis flare (improved)  - Diagnosed 34 years ago and followed by [facility] (Dr. Asmita Pollard)  - Has had hx of flare requiring high dose IV Steroids x5 days, chronic urinary incontinence  - Neuro consulted; received IV Solumedrol 500mg BID, Tecfidera  - We will continue steroid therapy with prednisone 16 day taper (80 mg decreasing by 20 mg per day every 4 days per neurology recommendation)      Anemia, normocytic  - Anemia work up ordered, FOBT, Retics, Iron studies, Ferritin, Folate/B12.    Hyperlipidemia  - Cont Pravastatin 40mg QD    Depression  - Cont Sertraline 50mg QD    History of cardiac arrhythmia  -  cont home Metoprolol 25mg BID (with holding parameters if HR < 50)    Neuropathy of both feet  - Cont Lyrica 75mg BID, Tizanidine 2mg BID    Resolved issues:  Sepsis secondary to pulmonary source/PNA (resolved)  -Likely secondary to aspiration PNA   -initially SIRS: 3(fever, tachypnea, leukocytosis), lactic acid : 3.8. BP and HR stable  -Blood cultures/influenza negative  -Echo : moderate PAH (RVSP 50mm), EF 60%  -ABX: A total days doxycycline with intermittently dosed vancomycin, Zosyn, ceftriaxone    Hyperglycemia (resolved)  -Improved with reduced steroid dose  -Discontinued antihyperglycemic therapy    Lactic acidosis (resolved)  -likely due to sepsis  -Lactic acid trend 3.8 ---> 2.4 ---> 2.9 ---> 2.4      Consultants:     Neurology   Pulmonology  General surgery      Procedures:        Temporal artery biopsy    Imaging/ Testing:      DX-CHEST-PORTABLE (1 VIEW)   Final Result      Stable bilateral basilar infiltrates and pleural effusions.      US-ABDOMEN COMPLETE SURVEY   Final Result      No gallstones or dilatation of the biliary tree.   Mild splenomegaly.   No free fluid.      DX-CHEST-LIMITED (1 VIEW)   Final Result      Perihilar and bibasilar opacities are not  significantly changed compared to prior.      Layering pleural effusions are not excluded.      DX-CHEST-PORTABLE (1 VIEW)   Final Result      Persistent bilateral midlung and lower lobe areas of edema or pneumonia with slight worsening in the left lower lobe.      DX-CHEST-PORTABLE (1 VIEW)   Final Result      1.  Stable bilateral mid and lower lung airspace opacities consistent with edema and/or infection.   2.  Small right pleural effusion.   3.  Interval placement of an enteric feeding tube which courses towards the stomach with the distal tip not visualized.      ECHOCARDIOGRAM COMP W/O CONT   Final Result      DX-ABDOMEN FOR TUBE PLACEMENT   Final Result      Feeding tube tip projects over the expected location the gastric antrum.      DX-CHEST-PORTABLE (1 VIEW)   Final Result      Increasing bilateral pulmonary consolidation.      DX-CHEST-LIMITED (1 VIEW)   Final Result      1.  Patchy bilateral interstitial opacities are suggestive of atypical infection. Findings have worsened from the prior exam.      2.  Questionable small bilateral pleural effusions.         MR-LUMBAR SPINE-WITH & W/O   Final Result      1.  There are tiny focal intramedullary T2 signal intensities in the lower end of the thoracic spinal cord likely representing chronic demyelinating plaques of multiple sclerosis.   2.  Mild chronic compression deformity at L1.      MR-CERVICAL SPINE-WITH & W/O   Final Result      1.  Abnormal intramedullary T2 signal intensity in the cervical spinal cord likely representing chronic demyelinating plaques of multiple sclerosis. None of the lesions demonstrates contrast enhancement.   2.  Minimal degenerative disease as described above.      MR-BRAIN-WITH & W/O   Final Result      1.  Multifocal abnormal T2 hyperintensities in the subcortical and periventricular white matter. None of the lesions demonstrates contrast enhancement. In view of history of multiple sclerosis this findings likely represents  chronic demyelinating plaques    of multiple sclerosis.   2.  Mild cerebral atrophy.      DX-CHEST-PORTABLE (1 VIEW)   Final Result      1.  Medial RIGHT lung base and greater atelectasis.   2.  No pleural fluid or pneumothorax.      CT-HEAD W/O   Final Result      No acute intracranial abnormality.          Discharge Medications:         Medication Reconciliation: Completed     Medication List      START taking these medications       Instructions    ondansetron 4 MG Tbdp   Commonly known as:  ZOFRAN ODT    Take 1 Tab by mouth every four hours as needed for Nausea/Vomiting (give PO if no IV route available).   Dose:  4 mg       predniSONE 20 MG Tabs   Commonly known as:  DELTASONE    Take 4 tablets daily for 4 days, then take 3 tablets daily for 4 days, then take 2 tablets daily for 4 days, then take one tablet daily for 4 days, then stop         CONTINUE taking these medications       Instructions    5- MG Caps    Take 100 mg by mouth every bedtime.   Dose:  100 mg       Acetyl-L-Carnitine HCl Powd    Take 500 mg by mouth every day.   Dose:  500 mg       ascorbic acid 500 MG Tabs   Commonly known as:  ascorbic acid    Take 500 mg by mouth every day.   Dose:  500 mg       B-12 5000 MCG Caps    Take 1 Cap by mouth every day.   Dose:  1 Cap       CALCIUM 1200 PO    Take 1 Tab by mouth every day.   Dose:  1 Tab       D-3-5 5000 UNIT Caps   Generic drug:  cholecalciferol    Take 5,000 Units by mouth every day.   Dose:  5000 Units       darifenacin 15 MG SR tablet   Commonly known as:  ENABLEX    Take 15 mg by mouth every bedtime.   Dose:  15 mg       Fish Oil 1200 MG Caps    Take 1 Tab by mouth every day.   Dose:  1 Tab       Green Tea (Camillia sinensis) 200 MG Caps    Take 400 mg by mouth every day.   Dose:  400 mg       Magnesium 500 MG Caps    Take 1 Cap by mouth every day.   Dose:  1 Cap       Magnesium Phosphate Powd    Take 1 Dose by mouth every bedtime.   Dose:  1 Dose       metoprolol SR 25 MG Tb24    Commonly known as:  TOPROL XL    Take 25 mg by mouth 2 Times a Day.   Dose:  25 mg       pravastatin 40 MG tablet   Last time this was given:  40 mg on 5/31/2017  9:10 PM   Commonly known as:  PRAVACHOL    Take 40 mg by mouth every evening.   Dose:  40 mg       pregabalin 75 MG Caps   Last time this was given:  75 mg on 6/1/2017  9:42 AM   Commonly known as:  LYRICA    Take 75 mg by mouth 2 times a day.   Dose:  75 mg       PROBIOTIC DAILY PO    Take 1 Tab by mouth every day.   Dose:  1 Tab       sertraline 50 MG Tabs   Last time this was given:  50 mg on 5/31/2017  9:09 PM   Commonly known as:  ZOLOFT    Take 50 mg by mouth every bedtime.   Dose:  50 mg       TECFIDERA 240 MG Cpdr   Generic drug:  Dimethyl Fumarate    Take 240 mg by mouth 2 times a day. Indications: Multiple Sclerosis   Dose:  240 mg       therapeutic multivitamin-minerals Tabs    Take 1 Tab by mouth every day.   Dose:  1 Tab       tizanidine 2 MG tablet   Last time this was given:  2 mg on 6/1/2017  9:42 AM   Commonly known as:  ZANAFLEX    Take 2 mg by mouth 2 Times a Day.   Dose:  2 mg       Turmeric 500 MG Caps    Take 1 Cap by mouth every day.   Dose:  1 Cap                 Disposition:   Discharge home    Diet:   Healthy    Activity:   As tolerated    Instructions:         The patient was instructed to return to the ER in the event of worsening symptoms. I have counseled the patient on the importance of compliance and the patient has agreed to proceed with all medical recommendations and follow up plan indicated above.   The patient understands that all medications come with benefits and risks. Risks may include permanent injury or death and these risks can be minimized with close reassessment and monitoring.        Primary Care Provider:    Vitaliy Peters M.D.  Discharge summary faxed to primary care provider:  Completed  Copy of discharge summary given to the patient: Completed    Follow up appointment details :      Follow up with  neurology in 1 week  Follow up with primary care in 2 weeks    Pending Studies:        None    Time spent on discharge day patient visit, preparing discharge paperwork and arranging for patient follow up.    Discharge Time (Minutes) :    52 minutes      Condition on Discharge    ______________________________________________________________________    Interval history/exam for day of discharge:    Sitting comfortably in chair at bedside with family. The patient is very highly motivated for discharge home. She is highly knowledgeable regarding her medical condition, and verbalizes that she is well aware of when and if she needs to return to the emergency department.    Filed Vitals:    05/31/17 0800 05/31/17 2200 06/01/17 0400 06/01/17 0800   BP:   101/68 90/59   Pulse: 80 85 79 76   Temp: 36.8 °C (98.2 °F) 37.1 °C (98.8 °F) 37 °C (98.6 °F) 37 °C (98.6 °F)   Resp:  20 20 20   Height:       Weight:   74.8 kg (164 lb 14.5 oz)    SpO2: 95% 95%  93%     Weight/BMI: Body mass index is 30.15 kg/(m^2).  Pulse Oximetry: 93 %, O2 (LPM): 4, O2 Delivery: Silicone Nasal Cannula    General: Alert and oriented ×3, no acute distress  CVS: Regular rate and rhythm, no murmur/scalp/rub  PULM: Clear to auscultation bilaterally, slightly diminished at the bases      Most Recent Labs:    Lab Results   Component Value Date/Time    WBC 6.5 06/01/2017 05:50 AM    RBC 3.52* 06/01/2017 05:50 AM    HEMOGLOBIN 11.3* 06/01/2017 05:50 AM    HEMATOCRIT 33.6* 06/01/2017 05:50 AM    MCV 95.5 06/01/2017 05:50 AM    MCH 32.1 06/01/2017 05:50 AM    MCHC 33.6 06/01/2017 05:50 AM    MPV 10.5 06/01/2017 05:50 AM    NEUTROPHILS-POLYS 78.30* 06/01/2017 05:50 AM    LYMPHOCYTES 12.10* 06/01/2017 05:50 AM    MONOCYTES 3.90 06/01/2017 05:50 AM    EOSINOPHILS 1.40 06/01/2017 05:50 AM    BASOPHILS 0.30 06/01/2017 05:50 AM    ANISOCYTOSIS 1+ 05/24/2017 02:26 PM      Lab Results   Component Value Date/Time    SODIUM 138 06/01/2017 05:50 AM    POTASSIUM 4.3  06/01/2017 05:50 AM    CHLORIDE 101 06/01/2017 05:50 AM    CO2 30 06/01/2017 05:50 AM    GLUCOSE 112* 06/01/2017 05:50 AM    BUN 20 06/01/2017 05:50 AM    CREATININE 0.74 06/01/2017 05:50 AM      Lab Results   Component Value Date/Time    ALT(SGPT) 119* 06/01/2017 05:50 AM    AST(SGOT) 46* 06/01/2017 05:50 AM    ALKALINE PHOSPHATASE 92 06/01/2017 05:50 AM    TOTAL BILIRUBIN 0.4 06/01/2017 05:50 AM    DIRECT BILIRUBIN 0.1 05/30/2017 04:00 AM    ALBUMIN 2.4* 06/01/2017 05:50 AM    GLOBULIN 2.8 06/01/2017 05:50 AM     No results found for: PROTHROMBTM, INR

## 2017-06-01 NOTE — FACE TO FACE
Face to Face Note  -  Durable Medical Equipment    Jerel Vences D.O. - NPI: [id number]  I certify that this patient is under my care and that they had a durable medical equipment(DME)face to face encounter by myself that meets the physician DME face-to-face encounter requirements with this patient on:    Date of encounter:   Patient:                    MRN:                       YOB: 2015  Justa Segovia  [id number]  1960     The encounter with the patient was in whole, or in part, for the following medical condition, which is the primary reason for durable medical equipment:  Other - Exertional dyspnea    I certify that, based on my findings, the following durable medical equipment is medically necessary:  Oxygen.    HOME O2 Saturation Measurements:(Values must be present for Home Oxygen orders)  Room air sat at rest: 89  Room air sat with amb: 85  With liters of O2: 4, O2 sat at rest with O2: 93  With Liters of O2: 4, O2 sat with amb with O2 : 90  Is the patient mobile?: Yes    My Clinical findings support the need for the above equipment due to:  Hypoxia    Supporting Symptoms: Exertional dyspnea

## 2017-06-01 NOTE — DISCHARGE PLANNING
Care Transition Team Assessment  Met with pt and spouse for ctt assessment. They live in [address] [facility] on ground level. Pt owns FWW, crutches, cane, needs minimal to no assistance. Needs home 02, referral to be placed to [facility]. Plans to d/c to home today with home 02  Information Source  Orientation : Oriented x 4  Information Given By: Patient  Informant's Name: Justa VIEYRA  Who is responsible for making decisions for patient? : Patient    Readmission Evaluation  Is this a readmission?: No    Elopement Risk  Legal Hold: No  Ambulatory or Self Mobile in Wheelchair: No-Not an Elopement Risk  Disoriented: No  Psychiatric Symptoms: None  History of Wandering: No  Elopement this Admit: No  Vocalizing Wanting to Leave: No  Displays Behaviors, Body Language Wanting to Leave: No-Not at Risk for Elopement  Elopement Risk: Not at Risk for Elopement    Interdisciplinary Discharge Planning  Does Admitting Nurse Feel This Could be a Complex Discharge?: No  Primary Care Physician: Dr. Peters  Lives with - Patient's Self Care Capacity: Spouse (Renetta Duffy)  Patient or legal guardian wants to designate a caregiver (see row info): No  Support Systems: Family Member(s)  Housing / Facility: [address] [place] [address]  Do You Take your Prescribed Medications Regularly: Yes  Able to Return to Previous ADL's: Yes  Mobility Issues: No  Prior Services: None  Patient Expects to be Discharged to:: home  Assistance Needed:  (FWW, cane, crutches)  Durable Medical Equipment: Walker    Discharge Preparedness  What is your plan after discharge?: Home with help  What are your discharge supports?: Spouse  Prior Functional Level: Ambulatory, Independent with Medication Management, Needs Assist with Activities of Daily Living, Uses Cane, Uses Walker    Functional Assesment  Prior Functional Level: Ambulatory, Independent with Medication Management, Needs Assist with Activities of Daily Living, Uses Cane, Uses Walker    Finances  Prescription  Coverage: Yes    Vision / Hearing Impairment  Vision Impairment : No  Hearing Impairment : No    Values / Beliefs / Concerns  Values / Beliefs Concerns : No    Advance Directive  Advance Directive?: None    Domestic Abuse  Have you ever been the victim of abuse or violence?: No  Physical Abuse or Sexual Abuse: No  Verbal Abuse or Emotional Abuse: No  Possible Abuse Reported to:: Not Applicable    Psychological Assessment  History of Substance Abuse: None  History of Psychiatric Problems: No         Anticipated Discharge Information  Anticipated discharge disposition: Home, Oxygen  Discharge Address:  ([address] [place] [address]. [place] )  Discharge Contact Phone Number: [phone]

## 2017-06-01 NOTE — DOCUMENTATION QUERY
DOCUMENTATION QUERY    PROVIDERS: Please select “Cosign w/ note”to reply to query.    To better represent the severity of illness of your patient, please review the following information and exercise your independent professional judgment in responding to this query.     Calcium 8.3 / 8.2 / 8.1 / 8.4  is noted in the Lab Results . Based upon the clinical findings, risk factors, and treatment, can a diagnosis be provided to support this finding?     • Hypocalcemia  • Findings of no clinical significance   • Other explanation of clinical findings  • Unable to determine (no explanation for clinical findings)    The medical record reflects the following:   Clinical Findings  Calcium 8.3 / 8.2 / 8.1 / 8.4 noted in lab results.    Treatment  Calcium Carbonate 500 mg twice daily, monitoring labs   Risk Factors  Multiple Sclerosis, Poss TIC vs Temporal Arteritis, Sepsis, Pneumonia, Acute resp failure.    Location within medical record  Lab Results      Thank you,   Cecily Stockton RN  Clinical   [phone]

## 2017-06-02 ENCOUNTER — PATIENT OUTREACH (OUTPATIENT)
Dept: HEALTH INFORMATION MANAGEMENT | Facility: OTHER | Age: 57
End: 2017-06-02

## 2017-06-05 NOTE — DOCUMENTATION QUERY
DOCUMENTATION QUERY    PROVIDERS: Please select “Cosign w/ note”to reply to query.    To better represent the severity of illness of your patient, please review the following information and exercise your independent professional judgment in responding to this query.     Sepsis is documented in the Progress Notes and Discharge Summary without any associated organ dysfunction.  This patient was also treated for acute respiratory failure. Per coding guidelines the clinical indicator for severe sepsis is a major organ dysfunction/failure/shock DUE to Sepsis.   Based upon the clinical findings, risk factors, and treatment, can a diagnosis be provided to support this finding?    • Acute respiratory failure is related to sepsis (Severe Sepsis)  • Acute respiratory failure is not related to sepsis (Sepsis)  • Other explanation of clinical findings (please document)  • Unable to determine      The medical record reflects the following:   Clinical Findings SIRS, Lactic acid 3.8, Asp Pneumonia   Treatment  Vanco, Zosyn, Doxycycline, Oxygen   Risk Factors  Aspiration Pneumonia, Sepsis, SIRS; Acute Resp Failure, MS   Location within medical record  Progress Notes, Discharge Summary     Thank you,   Eloina Paz, CCS

## 2019-06-03 ENCOUNTER — HOSPITAL ENCOUNTER (OUTPATIENT)
Dept: LAB | Facility: MEDICAL CENTER | Age: 59
End: 2019-06-03
Attending: INTERNAL MEDICINE
Payer: MEDICARE

## 2019-06-03 PROCEDURE — 82785 ASSAY OF IGE: CPT

## 2019-06-03 PROCEDURE — 83520 IMMUNOASSAY QUANT NOS NONAB: CPT

## 2019-06-03 PROCEDURE — 36415 COLL VENOUS BLD VENIPUNCTURE: CPT

## 2019-06-05 LAB — TRYPTASE SERPL-MCNC: 2.8 UG/L

## 2019-06-09 LAB — IGE SERPL-ACNC: 7 KU/L

## 2019-11-17 ENCOUNTER — APPOINTMENT (OUTPATIENT)
Dept: RADIOLOGY | Facility: MEDICAL CENTER | Age: 59
End: 2019-11-17
Attending: EMERGENCY MEDICINE
Payer: MEDICARE

## 2019-11-17 ENCOUNTER — HOSPITAL ENCOUNTER (OUTPATIENT)
Facility: MEDICAL CENTER | Age: 59
End: 2019-11-18
Attending: EMERGENCY MEDICINE | Admitting: INTERNAL MEDICINE
Payer: MEDICARE

## 2019-11-17 ENCOUNTER — APPOINTMENT (OUTPATIENT)
Dept: CARDIOLOGY | Facility: MEDICAL CENTER | Age: 59
End: 2019-11-17
Attending: INTERNAL MEDICINE
Payer: MEDICARE

## 2019-11-17 ENCOUNTER — APPOINTMENT (OUTPATIENT)
Dept: RADIOLOGY | Facility: MEDICAL CENTER | Age: 59
End: 2019-11-17
Attending: INTERNAL MEDICINE
Payer: MEDICARE

## 2019-11-17 DIAGNOSIS — R07.9 CHEST PAIN, UNSPECIFIED TYPE: ICD-10-CM

## 2019-11-17 LAB
ALBUMIN SERPL BCP-MCNC: 4.4 G/DL (ref 3.2–4.9)
ALBUMIN/GLOB SERPL: 2 G/DL
ALP SERPL-CCNC: 75 U/L (ref 30–99)
ALT SERPL-CCNC: 13 U/L (ref 2–50)
ANION GAP SERPL CALC-SCNC: 9 MMOL/L (ref 0–11.9)
AST SERPL-CCNC: 18 U/L (ref 12–45)
BASOPHILS # BLD AUTO: 0.6 % (ref 0–1.8)
BASOPHILS # BLD: 0.02 K/UL (ref 0–0.12)
BILIRUB SERPL-MCNC: 0.5 MG/DL (ref 0.1–1.5)
BUN SERPL-MCNC: 23 MG/DL (ref 8–22)
CALCIUM SERPL-MCNC: 9.4 MG/DL (ref 8.5–10.5)
CHLORIDE SERPL-SCNC: 107 MMOL/L (ref 96–112)
CO2 SERPL-SCNC: 24 MMOL/L (ref 20–33)
CREAT SERPL-MCNC: 0.95 MG/DL (ref 0.5–1.4)
D DIMER PPP IA.FEU-MCNC: <0.4 UG/ML (FEU) (ref 0–0.5)
EKG IMPRESSION: NORMAL
EKG IMPRESSION: NORMAL
EOSINOPHIL # BLD AUTO: 0.1 K/UL (ref 0–0.51)
EOSINOPHIL NFR BLD: 3.1 % (ref 0–6.9)
ERYTHROCYTE [DISTWIDTH] IN BLOOD BY AUTOMATED COUNT: 44 FL (ref 35.9–50)
GLOBULIN SER CALC-MCNC: 2.2 G/DL (ref 1.9–3.5)
GLUCOSE SERPL-MCNC: 100 MG/DL (ref 65–99)
HCT VFR BLD AUTO: 40.6 % (ref 37–47)
HGB BLD-MCNC: 13.8 G/DL (ref 12–16)
IMM GRANULOCYTES # BLD AUTO: 0 K/UL (ref 0–0.11)
IMM GRANULOCYTES NFR BLD AUTO: 0 % (ref 0–0.9)
LIPASE SERPL-CCNC: 14 U/L (ref 11–82)
LV EJECT FRACT MOD 2C 99903: 68.18
LV EJECT FRACT MOD 4C 99902: 72.47
LV EJECT FRACT MOD BP 99901: 69.86
LYMPHOCYTES # BLD AUTO: 1.1 K/UL (ref 1–4.8)
LYMPHOCYTES NFR BLD: 34.5 % (ref 22–41)
MCH RBC QN AUTO: 33.6 PG (ref 27–33)
MCHC RBC AUTO-ENTMCNC: 34 G/DL (ref 33.6–35)
MCV RBC AUTO: 98.8 FL (ref 81.4–97.8)
MONOCYTES # BLD AUTO: 0.31 K/UL (ref 0–0.85)
MONOCYTES NFR BLD AUTO: 9.7 % (ref 0–13.4)
NEUTROPHILS # BLD AUTO: 1.66 K/UL (ref 2–7.15)
NEUTROPHILS NFR BLD: 52.1 % (ref 44–72)
NRBC # BLD AUTO: 0 K/UL
NRBC BLD-RTO: 0 /100 WBC
PLATELET # BLD AUTO: 227 K/UL (ref 164–446)
PMV BLD AUTO: 10.4 FL (ref 9–12.9)
POTASSIUM SERPL-SCNC: 3.8 MMOL/L (ref 3.6–5.5)
PROT SERPL-MCNC: 6.6 G/DL (ref 6–8.2)
RBC # BLD AUTO: 4.11 M/UL (ref 4.2–5.4)
SODIUM SERPL-SCNC: 140 MMOL/L (ref 135–145)
TROPONIN T SERPL-MCNC: 6 NG/L (ref 6–19)
TROPONIN T SERPL-MCNC: 7 NG/L (ref 6–19)
TROPONIN T SERPL-MCNC: <6 NG/L (ref 6–19)
WBC # BLD AUTO: 3.2 K/UL (ref 4.8–10.8)

## 2019-11-17 PROCEDURE — 83690 ASSAY OF LIPASE: CPT

## 2019-11-17 PROCEDURE — G0378 HOSPITAL OBSERVATION PER HR: HCPCS

## 2019-11-17 PROCEDURE — 93306 TTE W/DOPPLER COMPLETE: CPT

## 2019-11-17 PROCEDURE — 85025 COMPLETE CBC W/AUTO DIFF WBC: CPT

## 2019-11-17 PROCEDURE — 85379 FIBRIN DEGRADATION QUANT: CPT

## 2019-11-17 PROCEDURE — 93005 ELECTROCARDIOGRAM TRACING: CPT | Performed by: EMERGENCY MEDICINE

## 2019-11-17 PROCEDURE — 93005 ELECTROCARDIOGRAM TRACING: CPT | Performed by: INTERNAL MEDICINE

## 2019-11-17 PROCEDURE — 99220 PR INITIAL OBSERVATION CARE,LEVL III: CPT | Performed by: INTERNAL MEDICINE

## 2019-11-17 PROCEDURE — A9270 NON-COVERED ITEM OR SERVICE: HCPCS | Performed by: INTERNAL MEDICINE

## 2019-11-17 PROCEDURE — 700117 HCHG RX CONTRAST REV CODE 255: Performed by: INTERNAL MEDICINE

## 2019-11-17 PROCEDURE — 84484 ASSAY OF TROPONIN QUANT: CPT

## 2019-11-17 PROCEDURE — 93010 ELECTROCARDIOGRAM REPORT: CPT | Performed by: INTERNAL MEDICINE

## 2019-11-17 PROCEDURE — 76705 ECHO EXAM OF ABDOMEN: CPT

## 2019-11-17 PROCEDURE — 71275 CT ANGIOGRAPHY CHEST: CPT

## 2019-11-17 PROCEDURE — 80053 COMPREHEN METABOLIC PANEL: CPT

## 2019-11-17 PROCEDURE — 93005 ELECTROCARDIOGRAM TRACING: CPT

## 2019-11-17 PROCEDURE — 700102 HCHG RX REV CODE 250 W/ 637 OVERRIDE(OP): Performed by: INTERNAL MEDICINE

## 2019-11-17 PROCEDURE — 99285 EMERGENCY DEPT VISIT HI MDM: CPT

## 2019-11-17 PROCEDURE — 36415 COLL VENOUS BLD VENIPUNCTURE: CPT

## 2019-11-17 PROCEDURE — 71045 X-RAY EXAM CHEST 1 VIEW: CPT

## 2019-11-17 PROCEDURE — 93306 TTE W/DOPPLER COMPLETE: CPT | Mod: 26 | Performed by: INTERNAL MEDICINE

## 2019-11-17 RX ORDER — PRAVASTATIN SODIUM 20 MG
40 TABLET ORAL NIGHTLY
Status: DISCONTINUED | OUTPATIENT
Start: 2019-11-17 | End: 2019-11-18 | Stop reason: HOSPADM

## 2019-11-17 RX ORDER — DARIFENACIN HYDROBROMIDE 15 MG/1
15 TABLET, EXTENDED RELEASE ORAL
Status: DISCONTINUED | OUTPATIENT
Start: 2019-11-17 | End: 2019-11-17

## 2019-11-17 RX ORDER — ASPIRIN 81 MG/1
324 TABLET, CHEWABLE ORAL DAILY
Status: DISCONTINUED | OUTPATIENT
Start: 2019-11-18 | End: 2019-11-18 | Stop reason: HOSPADM

## 2019-11-17 RX ORDER — CARBAMAZEPINE 200 MG/1
200 TABLET ORAL 3 TIMES DAILY
Status: DISCONTINUED | OUTPATIENT
Start: 2019-11-17 | End: 2019-11-18 | Stop reason: HOSPADM

## 2019-11-17 RX ORDER — DIMETHYL FUMARATE 240 MG/1
240 CAPSULE ORAL 2 TIMES DAILY
Status: DISCONTINUED | OUTPATIENT
Start: 2019-11-17 | End: 2019-11-18 | Stop reason: HOSPADM

## 2019-11-17 RX ORDER — CARBAMAZEPINE 200 MG/1
200 TABLET ORAL DAILY
COMMUNITY

## 2019-11-17 RX ORDER — OXYBUTYNIN CHLORIDE 10 MG/1
10 TABLET, EXTENDED RELEASE ORAL DAILY
COMMUNITY

## 2019-11-17 RX ORDER — PREGABALIN 75 MG/1
75 CAPSULE ORAL 2 TIMES DAILY
Status: DISCONTINUED | OUTPATIENT
Start: 2019-11-17 | End: 2019-11-18 | Stop reason: HOSPADM

## 2019-11-17 RX ORDER — ASPIRIN 300 MG/1
300 SUPPOSITORY RECTAL DAILY
Status: DISCONTINUED | OUTPATIENT
Start: 2019-11-18 | End: 2019-11-18 | Stop reason: HOSPADM

## 2019-11-17 RX ORDER — OXYBUTYNIN CHLORIDE 10 MG/1
10 TABLET, EXTENDED RELEASE ORAL DAILY
Status: DISCONTINUED | OUTPATIENT
Start: 2019-11-17 | End: 2019-11-18 | Stop reason: HOSPADM

## 2019-11-17 RX ORDER — ASPIRIN 325 MG
325 TABLET ORAL ONCE
Status: DISCONTINUED | OUTPATIENT
Start: 2019-11-17 | End: 2019-11-18 | Stop reason: HOSPADM

## 2019-11-17 RX ORDER — HEPARIN SODIUM 5000 [USP'U]/ML
5000 INJECTION, SOLUTION INTRAVENOUS; SUBCUTANEOUS EVERY 8 HOURS
Status: DISCONTINUED | OUTPATIENT
Start: 2019-11-17 | End: 2019-11-18 | Stop reason: HOSPADM

## 2019-11-17 RX ORDER — FENOFIBRATE 134 MG/1
1 CAPSULE ORAL DAILY
COMMUNITY

## 2019-11-17 RX ORDER — TIZANIDINE 4 MG/1
2 TABLET ORAL 2 TIMES DAILY
Status: DISCONTINUED | OUTPATIENT
Start: 2019-11-17 | End: 2019-11-18 | Stop reason: HOSPADM

## 2019-11-17 RX ORDER — ACETAMINOPHEN 325 MG/1
650 TABLET ORAL EVERY 6 HOURS PRN
Status: DISCONTINUED | OUTPATIENT
Start: 2019-11-17 | End: 2019-11-18 | Stop reason: HOSPADM

## 2019-11-17 RX ORDER — ASPIRIN 325 MG
325 TABLET ORAL DAILY
Status: DISCONTINUED | OUTPATIENT
Start: 2019-11-18 | End: 2019-11-18 | Stop reason: HOSPADM

## 2019-11-17 RX ADMIN — IOHEXOL 100 ML: 350 INJECTION, SOLUTION INTRAVENOUS at 15:50

## 2019-11-17 RX ADMIN — ACETAMINOPHEN 650 MG: 325 TABLET, FILM COATED ORAL at 13:24

## 2019-11-17 RX ADMIN — PRAVASTATIN SODIUM 40 MG: 20 TABLET ORAL at 22:42

## 2019-11-17 RX ADMIN — CARBAMAZEPINE 200 MG: 200 TABLET ORAL at 17:57

## 2019-11-17 RX ADMIN — DIMETHYL FUMARATE 240 MG: 240 CAPSULE ORAL at 16:35

## 2019-11-17 RX ADMIN — TIZANIDINE 2 MG: 4 TABLET ORAL at 17:58

## 2019-11-17 RX ADMIN — OXYBUTYNIN CHLORIDE 10 MG: 10 TABLET, EXTENDED RELEASE ORAL at 17:57

## 2019-11-17 RX ADMIN — PREGABALIN 75 MG: 75 CAPSULE ORAL at 16:33

## 2019-11-17 RX ADMIN — CARBAMAZEPINE 200 MG: 200 TABLET ORAL at 22:43

## 2019-11-17 ASSESSMENT — LIFESTYLE VARIABLES
DOES PATIENT WANT TO STOP DRINKING: NO
DO YOU DRINK ALCOHOL: NO
TOTAL SCORE: 0
HAVE YOU EVER FELT YOU SHOULD CUT DOWN ON YOUR DRINKING: NO
HOW MANY TIMES IN THE PAST YEAR HAVE YOU HAD 5 OR MORE DRINKS IN A DAY: 0
TOTAL SCORE: 0
EVER FELT BAD OR GUILTY ABOUT YOUR DRINKING: NO
ALCOHOL_USE: NO
AVERAGE NUMBER OF DAYS PER WEEK YOU HAVE A DRINK CONTAINING ALCOHOL: 0
EVER HAD A DRINK FIRST THING IN THE MORNING TO STEADY YOUR NERVES TO GET RID OF A HANGOVER: NO
TOTAL SCORE: 0
HAVE PEOPLE ANNOYED YOU BY CRITICIZING YOUR DRINKING: NO
ON A TYPICAL DAY WHEN YOU DRINK ALCOHOL HOW MANY DRINKS DO YOU HAVE: 0
SUBSTANCE_ABUSE: 0
CONSUMPTION TOTAL: NEGATIVE

## 2019-11-17 ASSESSMENT — PATIENT HEALTH QUESTIONNAIRE - PHQ9
1. LITTLE INTEREST OR PLEASURE IN DOING THINGS: NOT AT ALL
2. FEELING DOWN, DEPRESSED, IRRITABLE, OR HOPELESS: NOT AT ALL
SUM OF ALL RESPONSES TO PHQ9 QUESTIONS 1 AND 2: 0

## 2019-11-17 ASSESSMENT — ENCOUNTER SYMPTOMS
NAUSEA: 0
PALPITATIONS: 0
MYALGIAS: 0
EYE PAIN: 0
WEIGHT LOSS: 0
CHILLS: 0
CONSTIPATION: 0
BACK PAIN: 1
PHOTOPHOBIA: 0
VOMITING: 0
DOUBLE VISION: 0
BLURRED VISION: 0
FOCAL WEAKNESS: 0
SPUTUM PRODUCTION: 0
DIZZINESS: 0
HEADACHES: 0
SHORTNESS OF BREATH: 0
SENSORY CHANGE: 0
COUGH: 0
TREMORS: 0
NECK PAIN: 0
HALLUCINATIONS: 0
ORTHOPNEA: 0
TINGLING: 0
SPEECH CHANGE: 0
ABDOMINAL PAIN: 0
DIARRHEA: 0
FEVER: 0

## 2019-11-17 NOTE — ED NOTES
Med rec complete per pt at bedside   Allergies reviewed  No oral ABX within last 14 days     Pt states someone can bring her medications here tomorrow if needed   She has one pill of her TECFIDERA for MS on her to be verified if needed

## 2019-11-17 NOTE — ED TRIAGE NOTES
"Chief Complaint   Patient presents with   • Chest Pain     BIB EMS.  Patient reports sudden onset of upper back pain and \"unable to catch my breath\" while standing talking just PTA.  Patient given NTGx4 SL with effect per patient.  NSR  with \"a run of V-Tach\" per EMS. Patient A&Ox4, NAD.       "

## 2019-11-17 NOTE — ED PROVIDER NOTES
"ED Provider Note    Scribed for Penny Chan M.D. by Bonnie Mccarthy. 11/17/2019  11:27 AM    Primary care provider: Rima Ku M.D.  Means of arrival: Ambulance  History obtained from: Patient  History limited by: None    CHIEF COMPLAINT  Chief Complaint   Patient presents with   • Chest Pain     BIB EMS.  Patient reports sudden onset of upper back pain and \"unable to catch my breath\" while standing talking just PTA.  Patient given NTGx4 SL with effect per patient.  NSR  with \"a run of V-Tach\" per EMS. Patient A&Ox4, NAD.       HPI  Justa Ryan is a 59 y.o. female with a history of hyperlipidemia, arrhythmia, and MS  who presents to the Emergency Department via ambulance for evaluation of chest pain and back pain onset this morning. She states she had difficulty catching her breath. The patient states she walk talking to her neighbor when she suddenly developed squeezing upper back pain that progressively worsened and radiated to her chest. She denies any weakness or numbness of extremities.  She was given 4 doses of Nitroglycerin en route with mild relief of pain. She admits to drinking alcohol occassionally, none recently, but denies smoking. She denies history of hypertension. She reports history of PVCs, Vtach and syncope years ago. She was seen by cardiology and a stress test was last done 5 years ago. She denies any history of cardiac catheterization.      REVIEW OF SYSTEMS  HEENT:  No ear pain, congestion, or sore throat   CARDIAC: Chest pain, no palpitations    PULMONARY: Dyspnea, no cough, or congestion   : back pain, no dysuria,  or hematuria   Neuro: no weakness, numbness, aphasia, or headache  Musculoskeletal: no swelling, deformity, pain, or joint swelling    See history of present illness. All other systems are negative.     PAST MEDICAL HISTORY   has a past medical history of Gilbert syndrome, Hyperlipemia, Irregular heart rate (1997), Migraines (1989), Mitral valve " regurgitation, and MS (multiple sclerosis) ([facility]) (1993).    SURGICAL HISTORY   has a past surgical history that includes temporal artery biopsy (Left, 5/26/2017).    SOCIAL HISTORY  Social History     Tobacco Use   • Smoking status: Never Smoker   Substance Use Topics   • Alcohol use: No   • Drug use: No      Social History     Substance and Sexual Activity   Drug Use No       FAMILY HISTORY  Family History   Problem Relation Age of Onset   • Autoimmune Disease Father         MS       CURRENT MEDICATIONS  Current Outpatient Medications on File Prior to Encounter   Medication Sig Dispense Refill   • ondansetron (ZOFRAN ODT) 4 MG TABLET DISPERSIBLE Take 1 Tab by mouth every four hours as needed for Nausea/Vomiting (give PO if no IV route available). 40 Tab 0   • predniSONE (DELTASONE) 20 MG Tab Take 4 tablets daily for 4 days, then take 3 tablets daily for 4 days, then take 2 tablets daily for 4 days, then take one tablet daily for 4 days, then stop 40 Tab 0   • Dimethyl Fumarate (TECFIDERA) 240 MG CAPSULE DELAYED RELEASE Take 240 mg by mouth 2 times a day. Indications: Multiple Sclerosis     • pregabalin (LYRICA) 75 MG Cap Take 75 mg by mouth 2 times a day.     • darifenacin (ENABLEX) 15 MG SR tablet Take 15 mg by mouth every bedtime.     • sertraline (ZOLOFT) 50 MG Tab Take 50 mg by mouth every bedtime.     • Acetylcarnitine HCl (ACETYL-L-CARNITINE HCL) Powder Take 500 mg by mouth every day.     • ascorbic acid (ASCORBIC ACID) 500 MG Tab Take 500 mg by mouth every day.     • Turmeric 500 MG Cap Take 1 Cap by mouth every day.     • Probiotic Product (PROBIOTIC DAILY PO) Take 1 Tab by mouth every day.     • Omega-3 Fatty Acids (FISH OIL) 1200 MG Cap Take 1 Tab by mouth every day.     • Magnesium 500 MG Cap Take 1 Cap by mouth every day.     • Green Tea, Camillia sinensis, 200 MG Cap Take 400 mg by mouth every day.     • 5-Hydroxytryptophan (5-HTP) 100 MG Cap Take 100 mg by mouth every bedtime.     • Magnesium  "Phosphate Powder Take 1 Dose by mouth every bedtime.     • metoprolol SR (TOPROL XL) 25 MG TABLET SR 24 HR Take 25 mg by mouth 2 Times a Day.     • tizanidine (ZANAFLEX) 2 MG tablet Take 2 mg by mouth 2 Times a Day.     • pravastatin (PRAVACHOL) 40 MG tablet Take 40 mg by mouth every evening.     • Cyanocobalamin (B-12) 5000 MCG Cap Take 1 Cap by mouth every day.     • therapeutic multivitamin-minerals (THERAGRAN-M) Tab Take 1 Tab by mouth every day.     • Calcium Carbonate-Vit D-Min (CALCIUM 1200 PO) Take 1 Tab by mouth every day.     • cholecalciferol (D-3-5) 5000 UNIT Cap Take 5,000 Units by mouth every day.          ALLERGIES  Allergies   Allergen Reactions   • Tricyclic Antidepressants Rash     Rxn - 25 years ago          PHYSICAL EXAM  VITAL SIGNS: /62   Pulse (!) 54   Temp 36.8 °C (98.2 °F) (Temporal)   Resp 20   Ht 1.575 m (5' 2\")   Wt 68 kg (150 lb)   BMI 27.44 kg/m²     Constitutional: Well developed, Well nourished, No acute distress, Non-toxic appearance.   HEENT: Normocephalic, Atraumatic,  external ears normal, pharynx pink,  Mucous  Membranes moist, No rhinorrhea or mucosal edema  Eyes: PERRL, EOMI, Conjunctiva normal, No discharge.   Neck: Normal range of motion, No tenderness, Supple, No stridor.   Lymphatic: No lymphadenopathy    Cardiovascular: Regular Rate and Rhythm, No murmurs,  rubs, or gallops.   Thorax & Lungs: Lungs clear to auscultation bilaterally, No respiratory distress, No wheezes, rhales or rhonchi, No chest wall tenderness.   Abdomen: Bowel sounds normal, Soft, non tender, non distended,  No pulsatile masses., no rebound guarding or peritoneal signs.   Skin: Warm, Dry, No erythema, No rash,   Back:  No CVA tenderness,  No spinal tenderness, bony crepitance, step offs, or instability.   Neurologic: Alert & oriented x 3, Normal motor function, Normal sensory function, No focal deficits noted. Normal reflexes. Normal Cranial Nerves.  Extremities: Equal, intact distal pulses, " No cyanosis, clubbing or edema,  No tenderness.   Musculoskeletal: Good range of motion in all major joints. No tenderness to palpation or major deformities noted.     DIAGNOSTIC STUDIES / PROCEDURES    LABS  Results for orders placed or performed during the hospital encounter of 11/17/19   EC-ECHOCARDIOGRAM COMPLETE W/O CONT   Result Value Ref Range    Eject.Frac. MOD BP 69.86     Eject.Frac. MOD 4C 72.47     Eject.Frac. MOD 2C 68.18    CBC with Differential   Result Value Ref Range    WBC 3.2 (L) 4.8 - 10.8 K/uL    RBC 4.11 (L) 4.20 - 5.40 M/uL    Hemoglobin 13.8 12.0 - 16.0 g/dL    Hematocrit 40.6 37.0 - 47.0 %    MCV 98.8 (H) 81.4 - 97.8 fL    MCH 33.6 (H) 27.0 - 33.0 pg    MCHC 34.0 33.6 - 35.0 g/dL    RDW 44.0 35.9 - 50.0 fL    Platelet Count 227 164 - 446 K/uL    MPV 10.4 9.0 - 12.9 fL    Neutrophils-Polys 52.10 44.00 - 72.00 %    Lymphocytes 34.50 22.00 - 41.00 %    Monocytes 9.70 0.00 - 13.40 %    Eosinophils 3.10 0.00 - 6.90 %    Basophils 0.60 0.00 - 1.80 %    Immature Granulocytes 0.00 0.00 - 0.90 %    Nucleated RBC 0.00 /100 WBC    Neutrophils (Absolute) 1.66 (L) 2.00 - 7.15 K/uL    Lymphs (Absolute) 1.10 1.00 - 4.80 K/uL    Monos (Absolute) 0.31 0.00 - 0.85 K/uL    Eos (Absolute) 0.10 0.00 - 0.51 K/uL    Baso (Absolute) 0.02 0.00 - 0.12 K/uL    Immature Granulocytes (abs) 0.00 0.00 - 0.11 K/uL    NRBC (Absolute) 0.00 K/uL   Complete Metabolic Panel (CMP)   Result Value Ref Range    Sodium 140 135 - 145 mmol/L    Potassium 3.8 3.6 - 5.5 mmol/L    Chloride 107 96 - 112 mmol/L    Co2 24 20 - 33 mmol/L    Anion Gap 9.0 0.0 - 11.9    Glucose 100 (H) 65 - 99 mg/dL    Bun 23 (H) 8 - 22 mg/dL    Creatinine 0.95 0.50 - 1.40 mg/dL    Calcium 9.4 8.5 - 10.5 mg/dL    AST(SGOT) 18 12 - 45 U/L    ALT(SGPT) 13 2 - 50 U/L    Alkaline Phosphatase 75 30 - 99 U/L    Total Bilirubin 0.5 0.1 - 1.5 mg/dL    Albumin 4.4 3.2 - 4.9 g/dL    Total Protein 6.6 6.0 - 8.2 g/dL    Globulin 2.2 1.9 - 3.5 g/dL    A-G Ratio 2.0 g/dL    Troponin   Result Value Ref Range    Troponin T 7 6 - 19 ng/L   LIPASE   Result Value Ref Range    Lipase 14 11 - 82 U/L   ESTIMATED GFR   Result Value Ref Range    GFR If African American >60 >60 mL/min/1.73 m 2    GFR If Non African American >60 >60 mL/min/1.73 m 2   D-Dimer   Result Value Ref Range    D-Dimer Screen <0.40 0.00 - 0.50 ug/mL (FEU)   EKG   Result Value Ref Range    Report       [facility] Emergency Dept.    Test Date:  2019  Pt Name:    DHRUV MARTINEZ          Department: ER  MRN:        [id number]                      Room:       RD 03  Gender:     Female                       Technician: [id number]  :        1960                   Requested By:ER TRIAGE PROTOCOL  Order #:    [id number]                    Reading MD: SANDIE SEPULVEDA MD    Measurements  Intervals                                Axis  Rate:       55                           P:          70  WY:         180                          QRS:        80  QRSD:       90                           T:          62  QT:         472  QTc:        452    Interpretive Statements  SINUS BRADYCARDIA  Compared to ECG 2017 23:21:46  No significant changes  Electronically Signed On 2019 12:14:23 PST by SANDIE SEPULVEDA MD     EKG in four (4) hours   Result Value Ref Range    Report       [facility]    Test Date:  2019  Pt Name:    DHRUV MARTINEZ          Department: ER  MRN:        [id number]                      Room:       T210  Gender:     Female                       Technician: TXM  :        1960                   Requested By:MIR ZENDEJAS  Order #:    [id number]                    Reading MD:    Measurements  Intervals                                Axis  Rate:       72                           P:          55  WY:         182                          QRS:        75  QRSD:       94                           T:          46  QT:         416  QTc:        456    Interpretive  Statements  SINUS RHYTHM  Compared to ECG 11/17/2019 11:17:30  Sinus bradycardia no longer present        All labs reviewed by me.    EKG  12 lead EKG; Interpreted by emergency department physician, as shown above      RADIOLOGY  CT-CTA COMPLETE THORACOABDOMINAL AORTA   Final Result      No evidence of aortic aneurysm or dissection.      No central or segmental pulmonary embolus is identified.      Mild mosaic attenuation can be seen in small airway disease.      Mild atherosclerotic plaque at the origins of the celiac trunk and renal arteries bilaterally.      7 mm hyperenhancing lesion within the liver likely represents a flash filling hemangioma.      Moderate amount of colonic stool.                        US-RUQ   Final Result      No evidence of gallstones or biliary ductal dilatation.            EC-ECHOCARDIOGRAM COMPLETE W/O CONT   Final Result      DX-CHEST-PORTABLE (1 VIEW)   Final Result         No acute cardiac or pulmonary abnormality is identified.      NM-CARDIAC STRESS TEST    (Results Pending)     The radiologist's interpretation of all radiological studies have been reviewed by me.    COURSE & MEDICAL DECISION MAKING  Nursing notes, VS, PMSFHx reviewed in chart.    11:27 AM Patient seen and examined at bedside. Discussed plan of care with patient. I informed them that labs and imaging will be ordered to evaluate symptoms. Patient is understanding and agreeable with plan. Ordered DX chest, Lipase, CBC with diff, CMP, Troponin, EKG to evaluate her symptoms. The differential diagnoses include but are not limited to: Arrhythmia, Cardiac ischemia, Aortic dissection.     12:13 PM - I discussed the patient's case and the above findings with Dr. Blas (CDU) who kindly agrees to admit the patient.      12:17 PM Patient was reevaluated at bedside. Discussed lab and radiology results with the patient and informed them of results. She was informed that she will be admitted for further evaluation and  observation. The patient notes she has family history of gallbladder complications. Ordered US-RUQ to further evaluate.    DISPOSITION:  Patient will be admitted to Dr. Blas in guarded condition.      FINAL IMPRESSION  1. Chest pain, unspecified type          I, Bonnie Mccarthy (Scribe), am scribing for, and in the presence of, Penny Chan M.D..    Electronically signed by: Bonnie Mccarthy (Scribe), 11/17/2019    IPenny M.D. personally performed the services described in this documentation, as scribed by Bonnie Mccarthy in my presence, and it is both accurate and complete. C    The note accurately reflects work and decisions made by me.  Penny Chan  11/17/2019  5:09 PM

## 2019-11-17 NOTE — CARE PLAN
Problem: Safety  Goal: Will remain free from injury  Outcome: PROGRESSING AS EXPECTED  Goal: Will remain free from falls  Outcome: PROGRESSING AS EXPECTED     Problem: Pain Management  Goal: Pain level will decrease to patient's comfort goal  Outcome: PROGRESSING AS EXPECTED     Problem: Mobility  Goal: Risk for activity intolerance will decrease  Outcome: PROGRESSING AS EXPECTED     Problem: Medication  Goal: Compliance with prescribed medication will improve  Outcome: PROGRESSING AS EXPECTED     Problem: Knowledge Deficit  Goal: Knowledge of disease process/condition, treatment plan, diagnostic tests, and medications will improve  Outcome: PROGRESSING AS EXPECTED  Goal: Knowledge of the prescribed therapeutic regimen will improve  Outcome: PROGRESSING AS EXPECTED

## 2019-11-17 NOTE — ASSESSMENT & PLAN NOTE
She found to have severe chest pain that lasted for approximately 20 minutes and it resolved with nitro.  She underwent initial troponin and it was negative.  I ordered d-dimer and it came back negative.  On physical exam she has bilateral equal radial pulses with no difference.  I requested RN to perform blood pressure on both arms.  I ordered echocardiogram.  I ordered nuclear medicine stress testing  He does not have any murmur on physical exam.  Ultrasound right upper quadrant ordered by ED physician.  I discussed plan of care with the and answered all questions.

## 2019-11-17 NOTE — H&P
Hospital Medicine History & Physical Note    Date of Service  11/17/2019    Primary Care Physician  Rima Ku M.D.    Consultants  None    Code Status  None    Chief Complaint    Chest pain started today    History of Presenting Illness    59 y.o. female with past medical history of multiple sclerosis who presented to the hospital on 11/17/2019 with complaint of chest pain.  She described that she was standing in the driveway talking to the neighbor and suddenly she developed severe chest pain that lasted approximately 20 minutes.  She felt pressure-like sensation around her chest during that episode.  She received nitro and it helped and her symptoms of pain.  There is no specific radiation of this pain.  There is no specific aggravating factors.  At the time of evaluation she reported that her chest pain has subsided.  She expressed that her sister has this type of pain and she found to have gallbladder problem.  She denies symptoms of nausea, vomiting and diarrhea.  She denies smoking and drug use.    I discussed about this admission with ED physician Dr. Chan.    I interpreted the chest x-ray on my interpretation it did not show any acute cardiopulmonary process.    Review of Systems  Review of Systems   Constitutional: Negative for chills, fever and weight loss.   HENT: Negative for hearing loss and tinnitus.    Eyes: Negative for blurred vision, double vision, photophobia and pain.   Respiratory: Negative for cough, sputum production and shortness of breath.    Cardiovascular: Positive for chest pain. Negative for palpitations, orthopnea and leg swelling.   Gastrointestinal: Negative for abdominal pain, constipation, diarrhea, nausea and vomiting.   Genitourinary: Negative for dysuria, frequency and urgency.   Musculoskeletal: Positive for back pain. Negative for joint pain, myalgias and neck pain.   Skin: Negative for rash.   Neurological: Negative for dizziness, tingling, tremors, sensory change,  speech change, focal weakness and headaches.   Psychiatric/Behavioral: Negative for hallucinations and substance abuse.   All other systems reviewed and are negative.      Past Medical History   has a past medical history of Gilbert syndrome, Hyperlipemia, Irregular heart rate (1997), Migraines (1989), Mitral valve regurgitation, and MS (multiple sclerosis) ([facility]) (1993).    Surgical History   has a past surgical history that includes temporal artery biopsy (Left, 5/26/2017).     Family History  family history includes Autoimmune Disease in her father.     Social History   reports that she has never smoked. She does not have any smokeless tobacco history on file. She reports that she does not drink alcohol or use drugs.    Allergies  Allergies   Allergen Reactions   • Tricyclic Antidepressants Rash     Rxn - 25 years ago          Medications  Prior to Admission Medications   Prescriptions Last Dose Informant Patient Reported? Taking?   5-Hydroxytryptophan (5-HTP) 100 MG Cap 11/16/2019 at HS Patient Yes No   Sig: Take 100 mg by mouth every bedtime.   Acetylcarnitine HCl (ACETYL-L-CARNITINE HCL) Powder 11/16/2019 at AM Patient Yes No   Sig: Take 500 mg by mouth every day.   Calcium Carbonate-Vit D-Min (CALCIUM 1200 PO) 11/16/2019 at AM Patient Yes No   Sig: Take 1 Tab by mouth every day.   Cyanocobalamin (B-12) 5000 MCG Cap 11/16/2019 at AM Patient Yes No   Sig: Take 1 Cap by mouth every day.   Dimethyl Fumarate (TECFIDERA) 240 MG CAPSULE DELAYED RELEASE 11/16/2019 at PM Patient Yes No   Sig: Take 240 mg by mouth 2 times a day. Indications: Multiple Sclerosis   Fenofibrate 134 MG Cap 11/16/2019 at AM Patient Yes Yes   Sig: Take 1 Cap by mouth every day.   Green Tea, Camillia sinensis, 200 MG Cap 11/16/2019 at AM Patient Yes No   Sig: Take 400 mg by mouth every day.   Magnesium 500 MG Cap 11/16/2019 at AM Patient Yes No   Sig: Take 1 Cap by mouth every day.   Omega-3 Fatty Acids (FISH OIL) 1200 MG Cap 11/16/2019 at AM  Patient Yes No   Sig: Take 1 Tab by mouth every day.   Probiotic Product (PROBIOTIC DAILY PO) 11/16/2019 at AM Patient Yes No   Sig: Take 1 Tab by mouth every day.   Turmeric 500 MG Cap 11/16/2019 at AM Patient Yes No   Sig: Take 1 Cap by mouth every day.   ascorbic acid (ASCORBIC ACID) 500 MG Tab 11/16/2019 at AM Patient Yes No   Sig: Take 500 mg by mouth every day.   aspirin EC (ECOTRIN) 81 MG Tablet Delayed Response 11/16/2019 at PM Patient Yes Yes   Sig: Take 81 mg by mouth every day.   carBAMazepine (TEGRETOL) 200 MG Tab 11/16/2019 at PM Patient Yes Yes   Sig: Take 200 mg by mouth 3 times a day.   cholecalciferol (D-3-5) 5000 UNIT Cap 11/16/2019 at AM Patient Yes No   Sig: Take 5,000 Units by mouth every day.   darifenacin (ENABLEX) 15 MG SR tablet 11/16/2019 at HS Patient Yes No   Sig: Take 15 mg by mouth every bedtime.   oxybutynin SR (DITROPAN-XL) 10 MG CR tablet 11/16/2019 at AM Patient Yes Yes   Sig: Take 10 mg by mouth every day.   pravastatin (PRAVACHOL) 40 MG tablet 11/16/2019 at PM Patient Yes No   Sig: Take 40 mg by mouth every evening.   pregabalin (LYRICA) 75 MG Cap 11/16/2019 at PM Patient Yes No   Sig: Take 75 mg by mouth 2 times a day.   sertraline (ZOLOFT) 50 MG Tab 11/16/2019 at HS Patient Yes No   Sig: Take 50 mg by mouth every bedtime.   therapeutic multivitamin-minerals (THERAGRAN-M) Tab 11/16/2019 at AM Patient Yes No   Sig: Take 1 Tab by mouth every day.   tizanidine (ZANAFLEX) 2 MG tablet 11/16/2019 at PM Patient Yes No   Sig: Take 2 mg by mouth 2 Times a Day.      Facility-Administered Medications: None       Physical Exam  Temp:  [36.4 °C (97.5 °F)-36.8 °C (98.2 °F)] 36.4 °C (97.5 °F)  Pulse:  [54-66] 66  Resp:  [15-20] 16  BP: (102-114)/(57-62) 114/57  SpO2:  [94 %] 94 %    Physical Exam  Vitals signs reviewed.   Constitutional:       General: She is not in acute distress.     Appearance: Normal appearance. She is not ill-appearing.   HENT:      Head: Normocephalic and atraumatic.       Nose: No congestion.   Eyes:      General:         Right eye: No discharge.         Left eye: No discharge.      Pupils: Pupils are equal, round, and reactive to light.   Neck:      Musculoskeletal: Normal range of motion. No neck rigidity.   Cardiovascular:      Rate and Rhythm: Normal rate and regular rhythm.      Pulses: Normal pulses.      Heart sounds: Normal heart sounds. No murmur.      Comments: Equal bilateral radial pulses  Pulmonary:      Effort: Pulmonary effort is normal. No respiratory distress.      Breath sounds: Normal breath sounds. No stridor.   Abdominal:      General: Bowel sounds are normal. There is no distension.      Palpations: Abdomen is soft.      Tenderness: There is no tenderness.      Comments: No right upper quadrant tenderness.   Musculoskeletal: Normal range of motion.         General: No swelling or tenderness.   Skin:     General: Skin is warm.      Capillary Refill: Capillary refill takes less than 2 seconds.      Coloration: Skin is not jaundiced or pale.      Findings: No bruising.   Neurological:      General: No focal deficit present.      Mental Status: She is alert and oriented to person, place, and time.      Cranial Nerves: No cranial nerve deficit.   Psychiatric:         Mood and Affect: Mood normal.         Behavior: Behavior normal.         Laboratory:  Recent Labs     11/17/19  1130   WBC 3.2*   RBC 4.11*   HEMOGLOBIN 13.8   HEMATOCRIT 40.6   MCV 98.8*   MCH 33.6*   MCHC 34.0   RDW 44.0   PLATELETCT 227   MPV 10.4     Recent Labs     11/17/19  1130   SODIUM 140   POTASSIUM 3.8   CHLORIDE 107   CO2 24   GLUCOSE 100*   BUN 23*   CREATININE 0.95   CALCIUM 9.4     Recent Labs     11/17/19  1130   ALTSGPT 13   ASTSGOT 18   ALKPHOSPHAT 75   TBILIRUBIN 0.5   LIPASE 14   GLUCOSE 100*         No results for input(s): NTPROBNP in the last 72 hours.      Recent Labs     11/17/19  1130   TROPONINT 7       Urinalysis:    No results found     Imaging:  DX-CHEST-PORTABLE (1 VIEW)    Final Result         No acute cardiac or pulmonary abnormality is identified.      EC-ECHOCARDIOGRAM COMPLETE W/O CONT    (Results Pending)   US-RUQ    (Results Pending)         Assessment/Plan:  I anticipate this patient is appropriate for observation status at this time.    Chest pain  Assessment & Plan  She found to have severe chest pain that lasted for approximately 20 minutes and it resolved with nitro.  She underwent initial troponin and it was negative.  I ordered d-dimer and it came back negative.  On physical exam she has bilateral equal radial pulses with no difference.  I requested RN to perform blood pressure on both arms.  I ordered echocardiogram.  I ordered nuclear medicine stress testing  He does not have any murmur on physical exam.  Ultrasound right upper quadrant ordered by ED physician.  I discussed plan of care with the and answered all questions.      Multiple sclerosis (HCC)- (present on admission)  Assessment & Plan  She follows neurology as outpatient.  Currently stable.  Continue outpatient medications.    Depression- (present on admission)  Assessment & Plan  Continue outpatient medications.      VTE prophylaxis: Heparin

## 2019-11-18 ENCOUNTER — APPOINTMENT (OUTPATIENT)
Dept: RADIOLOGY | Facility: MEDICAL CENTER | Age: 59
End: 2019-11-18
Attending: INTERNAL MEDICINE
Payer: MEDICARE

## 2019-11-18 ENCOUNTER — PATIENT OUTREACH (OUTPATIENT)
Dept: HEALTH INFORMATION MANAGEMENT | Facility: OTHER | Age: 59
End: 2019-11-18

## 2019-11-18 VITALS
HEIGHT: 62 IN | SYSTOLIC BLOOD PRESSURE: 103 MMHG | RESPIRATION RATE: 16 BRPM | BODY MASS INDEX: 28.28 KG/M2 | TEMPERATURE: 97.3 F | DIASTOLIC BLOOD PRESSURE: 68 MMHG | HEART RATE: 70 BPM | OXYGEN SATURATION: 95 % | WEIGHT: 153.66 LBS

## 2019-11-18 PROBLEM — R07.9 CHEST PAIN: Status: RESOLVED | Noted: 2019-11-17 | Resolved: 2019-11-18

## 2019-11-18 PROCEDURE — G0378 HOSPITAL OBSERVATION PER HR: HCPCS

## 2019-11-18 PROCEDURE — 700111 HCHG RX REV CODE 636 W/ 250 OVERRIDE (IP)

## 2019-11-18 PROCEDURE — A9502 TC99M TETROFOSMIN: HCPCS

## 2019-11-18 PROCEDURE — 99217 PR OBSERVATION CARE DISCHARGE: CPT | Performed by: INTERNAL MEDICINE

## 2019-11-18 PROCEDURE — 700102 HCHG RX REV CODE 250 W/ 637 OVERRIDE(OP): Performed by: INTERNAL MEDICINE

## 2019-11-18 PROCEDURE — A9270 NON-COVERED ITEM OR SERVICE: HCPCS | Performed by: INTERNAL MEDICINE

## 2019-11-18 RX ORDER — REGADENOSON 0.08 MG/ML
0.4 INJECTION, SOLUTION INTRAVENOUS ONCE
Status: COMPLETED | OUTPATIENT
Start: 2019-11-18 | End: 2019-11-18

## 2019-11-18 RX ORDER — REGADENOSON 0.08 MG/ML
INJECTION, SOLUTION INTRAVENOUS
Status: COMPLETED
Start: 2019-11-18 | End: 2019-11-18

## 2019-11-18 RX ADMIN — REGADENOSON 0.4 MG: 0.08 INJECTION, SOLUTION INTRAVENOUS at 08:24

## 2019-11-18 RX ADMIN — TIZANIDINE 2 MG: 4 TABLET ORAL at 06:14

## 2019-11-18 RX ADMIN — ASPIRIN 81 MG 324 MG: 81 TABLET ORAL at 06:13

## 2019-11-18 RX ADMIN — CARBAMAZEPINE 200 MG: 200 TABLET ORAL at 06:13

## 2019-11-18 RX ADMIN — OXYBUTYNIN CHLORIDE 10 MG: 10 TABLET, EXTENDED RELEASE ORAL at 06:13

## 2019-11-18 RX ADMIN — PREGABALIN 75 MG: 75 CAPSULE ORAL at 03:35

## 2019-11-18 ASSESSMENT — PATIENT HEALTH QUESTIONNAIRE - PHQ9
2. FEELING DOWN, DEPRESSED, IRRITABLE, OR HOPELESS: NOT AT ALL
SUM OF ALL RESPONSES TO PHQ9 QUESTIONS 1 AND 2: 0
1. LITTLE INTEREST OR PLEASURE IN DOING THINGS: NOT AT ALL

## 2019-11-18 NOTE — DISCHARGE SUMMARY
"Discharge Summary    CHIEF COMPLAINT ON ADMISSION  Chief Complaint   Patient presents with   • Chest Pain     BIB EMS.  Patient reports sudden onset of upper back pain and \"unable to catch my breath\" while standing talking just PTA.  Patient given NTGx4 SL with effect per patient.  NSR  with \"a run of V-Tach\" per EMS. Patient A&Ox4, NAD.       Reason for Admission  EMS R=03     Admission Date  11/17/2019    CODE STATUS  Prior    HPI & HOSPITAL COURSE  This is a 59 y.o. female with a past medical history of multiple sclerosis here with chest pain.  She had a sudden onset severe substernal chest pain lasting approximately 20 minutes before complete resolution.  On admission troponins remain negative.  EKG was stable.  Cardiac stress test was negative for ischemia.  Echocardiogram revealed an LVEF of 70% with no acute abnormalities.  At this time ACS is not suspected.  She was incidentally noted to have a 7 mm lesion on the liver consistent with hemangioma on CT scan.  Further work-up with right upper quadrant ultrasound was negative for acute abnormalities.  LFTs are within normal limits.  She does report a history of hemangioma and currently follows with this as an outpatient.    Over her hospital stay the patient has maintained stable vital signs and has had no further recurrence of chest pain.  She remains at her mental and physical baseline.  The exact etiology of her pain is unknown although there does not appear to be an acute process requiring acute intervention.  The patient is safe for discharge at this time with close outpatient follow-up.       Therefore, she is discharged in good and stable condition to home with close outpatient follow-up.    Discharge Date  11/18/2019    DISCHARGE DIAGNOSES  Active Problems:    Multiple sclerosis (HCC) POA: Yes      Overview: Pt has hx of MS since the age of 22, started as Seizures managed with       Phenobarbital and diagnosed 10 yrs later as MS      Hx of worst episode " 12 yrs ago requiring very high doses of Steroids (1200       mg ) * 5days      Was in remission for 10 yrs      Symptoms recurred since Thursday  (May 18th 2017)      Leg cramps, heaviness of both legs, followed by severe headache unilateral       left side temporal region      Bladder control lost since yesterday          Depression POA: Yes  Resolved Problems:    Chest pain POA: Unknown      FOLLOW UP  Follow-up with PCP in 1 to 2 weeks.    MEDICATIONS ON DISCHARGE     Medication List      CONTINUE taking these medications      Instructions   5- MG Caps   Take 100 mg by mouth every bedtime.  Dose:  100 mg     Acetyl-L-Carnitine HCl Powd   Take 500 mg by mouth every day.  Dose:  500 mg     ascorbic acid 500 MG Tabs  Commonly known as:  ascorbic acid   Take 500 mg by mouth every day.  Dose:  500 mg     aspirin EC 81 MG Tbec  Commonly known as:  ECOTRIN   Take 81 mg by mouth every day.  Dose:  81 mg     B-12 5000 MCG Caps   Take 1 Cap by mouth every day.  Dose:  1 Cap     CALCIUM 1200 PO   Take 1 Tab by mouth every day.  Dose:  1 Tab     carBAMazepine 200 MG Tabs  Commonly known as:  TEGRETOL   Take 200 mg by mouth 3 times a day.  Dose:  200 mg     D-3-5 5000 UNIT Caps  Generic drug:  cholecalciferol   Take 5,000 Units by mouth every day.  Dose:  5,000 Units     darifenacin 15 MG SR tablet  Commonly known as:  ENABLEX   Take 15 mg by mouth every bedtime.  Dose:  15 mg     Fenofibrate 134 MG Caps   Take 1 Cap by mouth every day.  Dose:  1 Cap     Fish Oil 1200 MG Caps   Take 1 Tab by mouth every day.  Dose:  1 Tab     Green Tea (Melanie sinensis) 200 MG Caps   Take 400 mg by mouth every day.  Dose:  400 mg     Magnesium 500 MG Caps   Take 1 Cap by mouth every day.  Dose:  1 Cap     oxybutynin SR 10 MG CR tablet  Commonly known as:  DITROPAN-XL   Take 10 mg by mouth every day.  Dose:  10 mg     pravastatin 40 MG tablet  Commonly known as:  PRAVACHOL   Take 40 mg by mouth every evening.  Dose:  40 mg      pregabalin 75 MG Caps  Commonly known as:  LYRICA   Take 75 mg by mouth 2 times a day.  Dose:  75 mg     PROBIOTIC DAILY PO   Take 1 Tab by mouth every day.  Dose:  1 Tab     sertraline 50 MG Tabs  Commonly known as:  ZOLOFT   Take 50 mg by mouth every bedtime.  Dose:  50 mg     TECFIDERA 240 MG Cpdr  Generic drug:  Dimethyl Fumarate   Take 240 mg by mouth 2 times a day. Indications: Multiple Sclerosis  Dose:  240 mg     therapeutic multivitamin-minerals Tabs   Take 1 Tab by mouth every day.  Dose:  1 Tab     tizanidine 2 MG tablet  Commonly known as:  ZANAFLEX   Take 2 mg by mouth 2 Times a Day.  Dose:  2 mg     Turmeric 500 MG Caps   Take 1 Cap by mouth every day.  Dose:  1 Cap            Allergies  Allergies   Allergen Reactions   • Tricyclic Antidepressants Rash     Rxn - 25 years ago          DIET  Orders Placed This Encounter   Procedures   • Diet Order Cardiac     Standing Status:   Standing     Number of Occurrences:   1     Order Specific Question:   Diet:     Answer:   Cardiac [6]     Order Specific Question:   Miscellaneous modifications:     Answer:   No Decaf, No Caffeine(for test) [11]       ACTIVITY  As tolerated.  Weight bearing as tolerated    LABORATORY  Lab Results   Component Value Date    SODIUM 140 11/17/2019    POTASSIUM 3.8 11/17/2019    CHLORIDE 107 11/17/2019    CO2 24 11/17/2019    GLUCOSE 100 (H) 11/17/2019    BUN 23 (H) 11/17/2019    CREATININE 0.95 11/17/2019        Lab Results   Component Value Date    WBC 3.2 (L) 11/17/2019    HEMOGLOBIN 13.8 11/17/2019    HEMATOCRIT 40.6 11/17/2019    PLATELETCT 227 11/17/2019

## 2019-11-18 NOTE — CARE PLAN
Problem: Safety  Goal: Will remain free from injury  11/17/2019 1930 by Imtiaz Em R.N.  Outcome: PROGRESSING AS EXPECTED  11/17/2019 1301 by Imtiaz Em R.N.  Outcome: PROGRESSING AS EXPECTED  Goal: Will remain free from falls  11/17/2019 1930 by Imtiaz Em R.N.  Outcome: PROGRESSING AS EXPECTED  11/17/2019 1301 by Imtiaz Em R.N.  Outcome: PROGRESSING AS EXPECTED     Problem: Pain Management  Goal: Pain level will decrease to patient's comfort goal  11/17/2019 1930 by Imtiaz Em R.N.  Outcome: PROGRESSING AS EXPECTED  11/17/2019 1301 by Imtiaz Em R.N.  Outcome: PROGRESSING AS EXPECTED     Problem: Mobility  Goal: Risk for activity intolerance will decrease  11/17/2019 1930 by Imtiaz Em R.N.  Outcome: PROGRESSING AS EXPECTED  11/17/2019 1301 by Imtiaz Em R.N.  Outcome: PROGRESSING AS EXPECTED     Problem: Medication  Goal: Compliance with prescribed medication will improve  11/17/2019 1930 by Imtiaz Em R.N.  Outcome: PROGRESSING AS EXPECTED  11/17/2019 1301 by Imtiaz Em R.N.  Outcome: PROGRESSING AS EXPECTED     Problem: Knowledge Deficit  Goal: Knowledge of disease process/condition, treatment plan, diagnostic tests, and medications will improve  11/17/2019 1930 by Imtiaz Em R.N.  Outcome: PROGRESSING AS EXPECTED  11/17/2019 1301 by Imtiaz Em R.N.  Outcome: PROGRESSING AS EXPECTED  Goal: Knowledge of the prescribed therapeutic regimen will improve  11/17/2019 1930 by Imtiaz Em R.N.  Outcome: PROGRESSING AS EXPECTED  11/17/2019 1301 by Imtiaz Em R.N.  Outcome: PROGRESSING AS EXPECTED

## 2019-11-18 NOTE — DISCHARGE PLANNING
Care Transition Team Assessment    Spoke with patient at bedside. Uses stimulator for foot drop at times. Spouse will be ride @ D/C. Anticipate no needs @ present time.    Information Source  Orientation : Oriented x 4  Information Given By: Patient    Readmission Evaluation  Is this a readmission?: No    Interdisciplinary Discharge Planning  Primary Care Physician: Rima ardon  Lives with - Patient's Self Care Capacity: Spouse  Patient or legal guardian wants to designate a caregiver (see row info): No  Support Systems: Spouse / Significant Other  Housing / Facility: [address]  Do You Take your Prescribed Medications Regularly: Yes  Able to Return to Previous ADL's: Yes  Mobility Issues: Yes  Prior Services: Home-Independent  Patient Expects to be Discharged to:: Home  Assistance Needed: No  Durable Medical Equipment: Not Applicable    Discharge Preparedness  What are your discharge supports?: Spouse    Finances  Prescription Coverage: Yes    Anticipated Discharge Information  Anticipated discharge disposition: Home  Discharge Address: [address]   Discharge Contact Phone Number: [phone]

## 2019-11-18 NOTE — DISCHARGE INSTRUCTIONS
Discharge Instructions    Discharged to home by car with relative. Discharged via walking, hospital escort: Yes.  Special equipment needed: Not Applicable    Be sure to schedule a follow-up appointment with your primary care doctor or any specialists as instructed.     Discharge Plan:   Diet Plan: Discussed  Activity Level: Discussed  Confirmed Follow up Appointment: Appointment Scheduled  Confirmed Symptoms Management: Discussed  Medication Reconciliation Updated: Yes  Influenza Vaccine Indication: Not indicated: Previously immunized this influenza season and > 8 years of age    I understand that a diet low in cholesterol, fat, and sodium is recommended for good health. Unless I have been given specific instructions below for another diet, I accept this instruction as my diet prescription.   Other diet: Heart Healthy     Special Instructions: None    · Is patient discharged on Warfarin / Coumadin?   No     Depression / Suicide Risk    As you are discharged from this [facility] facility, it is important to learn how to keep safe from harming yourself.    Recognize the warning signs:  · Abrupt changes in personality, positive or negative- including increase in energy   · Giving away possessions  · Change in eating patterns- significant weight changes-  positive or negative  · Change in sleeping patterns- unable to sleep or sleeping all the time   · Unwillingness or inability to communicate  · Depression  · Unusual sadness, discouragement and loneliness  · Talk of wanting to die  · Neglect of personal appearance   · Rebelliousness- reckless behavior  · Withdrawal from people/activities they love  · Confusion- inability to concentrate     If you or a loved one observes any of these behaviors or has concerns about self-harm, here's what you can do:  · Talk about it- your feelings and reasons for harming yourself  · Remove any means that you might use to hurt yourself (examples: pills, rope, extension cords,  firearm)  · Get professional help from the community (Mental Health, Substance Abuse, psychological counseling)  · Do not be alone:Call your Safe Contact- someone whom you trust who will be there for you.  · Call your local CRISIS HOTLINE 973-7902 or 333-937-1850  · Call your local Children's Mobile Crisis Response Team [place] (676) 614-3573 or www.[employer] [place]  · Call the toll free National Suicide Prevention Hotlines   · National Suicide Prevention Lifeline 903-057-GXDX (2003)  · National Hope Line Network 800-SUICIDE (337-2970)

## 2020-03-19 ENCOUNTER — HOSPITAL ENCOUNTER (OUTPATIENT)
Facility: MEDICAL CENTER | Age: 60
End: 2020-03-19
Attending: INTERNAL MEDICINE | Admitting: INTERNAL MEDICINE
Payer: MEDICARE

## 2020-06-17 RX ORDER — FLUTICASONE PROPIONATE 50 MCG
1 SPRAY, SUSPENSION (ML) NASAL DAILY
COMMUNITY

## 2020-06-17 RX ORDER — MONTELUKAST SODIUM 10 MG/1
10 TABLET ORAL DAILY
COMMUNITY

## 2020-06-19 ENCOUNTER — OFFICE VISIT (OUTPATIENT)
Dept: ADMISSIONS | Facility: MEDICAL CENTER | Age: 60
End: 2020-06-19
Attending: INTERNAL MEDICINE
Payer: MEDICARE

## 2020-06-19 DIAGNOSIS — Z01.812 PRE-OPERATIVE LABORATORY EXAMINATION: ICD-10-CM

## 2020-06-19 LAB
COVID ORDER STATUS COVID19: NORMAL
SARS-COV-2 RNA RESP QL NAA+PROBE: NOTDETECTED
SPECIMEN SOURCE: NORMAL

## 2020-06-19 PROCEDURE — C9803 HOPD COVID-19 SPEC COLLECT: HCPCS

## 2020-06-19 PROCEDURE — U0003 INFECTIOUS AGENT DETECTION BY NUCLEIC ACID (DNA OR RNA); SEVERE ACUTE RESPIRATORY SYNDROME CORONAVIRUS 2 (SARS-COV-2) (CORONAVIRUS DISEASE [COVID-19]), AMPLIFIED PROBE TECHNIQUE, MAKING USE OF HIGH THROUGHPUT TECHNOLOGIES AS DESCRIBED BY CMS-2020-01-R: HCPCS

## 2020-06-24 ENCOUNTER — HOSPITAL ENCOUNTER (OUTPATIENT)
Facility: MEDICAL CENTER | Age: 60
End: 2020-06-24
Attending: INTERNAL MEDICINE | Admitting: INTERNAL MEDICINE
Payer: MEDICARE

## 2020-06-24 VITALS
WEIGHT: 138.89 LBS | BODY MASS INDEX: 25.56 KG/M2 | HEART RATE: 54 BPM | OXYGEN SATURATION: 99 % | TEMPERATURE: 97.4 F | SYSTOLIC BLOOD PRESSURE: 112 MMHG | RESPIRATION RATE: 16 BRPM | DIASTOLIC BLOOD PRESSURE: 65 MMHG | HEIGHT: 62 IN

## 2020-06-24 PROCEDURE — 91010 ESOPHAGUS MOTILITY STUDY: CPT | Performed by: INTERNAL MEDICINE

## 2020-06-24 PROCEDURE — 160035 HCHG PACU - 1ST 60 MINS PHASE I: Performed by: INTERNAL MEDICINE

## 2020-06-24 PROCEDURE — 700101 HCHG RX REV CODE 250

## 2020-06-24 PROCEDURE — 160048 HCHG OR STATISTICAL LEVEL 1-5: Performed by: INTERNAL MEDICINE

## 2020-06-24 RX ORDER — LIDOCAINE HYDROCHLORIDE 20 MG/ML
SOLUTION OROPHARYNGEAL
Status: DISCONTINUED
Start: 2020-06-24 | End: 2020-06-24 | Stop reason: HOSPADM

## 2020-06-24 RX ORDER — LIDOCAINE HYDROCHLORIDE 20 MG/ML
JELLY TOPICAL
Status: DISCONTINUED
Start: 2020-06-24 | End: 2020-06-24 | Stop reason: HOSPADM

## 2020-06-24 ASSESSMENT — FIBROSIS 4 INDEX: FIB4 SCORE: 1.25

## 2020-06-24 NOTE — PROGRESS NOTES
Risk and benefits reviewed with patient including the following:  Esophageal manometry is generally safe, though complications including nose bleeds, and nose and/or throat discomfort may occur.  Acquired data may be inaccurate due to the inability to place the manometry tube in the correct position.  Potential serious risks which include irregular heartbeat, aspiration, or perforation are extremely rare.  Your physician believes these potential risks are outweighed by the benefits of this test in your case.  Patient agrees and wishes to continue with test.

## 2020-06-24 NOTE — DISCHARGE INSTRUCTIONS
Manometry - Discharge Instructions    Manometry  1.  Resume regular diet and medications.  2.  Follow up with your doctor.  3.  Additional instructions: none    You should call 911 if you develop problems with breathing or chest pain.  If any questions arise, call your doctor. If your doctor is not available, please feel free to call [phone]. You can also call the [employer] HOTLINE open 24 hours/day, 7 days/week and speak to a nurse at [phone], or toll free [phone].    I acknowledge receipt and understanding of these Home Care Instructions.

## 2020-10-07 ENCOUNTER — HOSPITAL ENCOUNTER (OUTPATIENT)
Dept: HOSPITAL 8 - CACL | Age: 60
Discharge: HOME | End: 2020-10-07
Attending: INTERNAL MEDICINE
Payer: MEDICARE

## 2020-10-07 VITALS — SYSTOLIC BLOOD PRESSURE: 120 MMHG | DIASTOLIC BLOOD PRESSURE: 74 MMHG

## 2020-10-07 VITALS — BODY MASS INDEX: 23.94 KG/M2 | WEIGHT: 130.07 LBS | HEIGHT: 62 IN

## 2020-10-07 DIAGNOSIS — R06.02: Primary | ICD-10-CM

## 2020-10-07 DIAGNOSIS — Z88.5: ICD-10-CM

## 2020-10-07 DIAGNOSIS — Z79.82: ICD-10-CM

## 2020-10-07 DIAGNOSIS — G35: ICD-10-CM

## 2020-10-07 DIAGNOSIS — Z79.899: ICD-10-CM

## 2020-10-07 DIAGNOSIS — I27.9: ICD-10-CM

## 2020-10-07 DIAGNOSIS — E78.2: ICD-10-CM

## 2020-10-07 DIAGNOSIS — Z88.8: ICD-10-CM

## 2020-10-07 PROCEDURE — 82330 ASSAY OF CALCIUM: CPT

## 2020-10-07 PROCEDURE — 85014 HEMATOCRIT: CPT

## 2020-10-07 PROCEDURE — C1894 INTRO/SHEATH, NON-LASER: HCPCS

## 2020-10-07 PROCEDURE — 82947 ASSAY GLUCOSE BLOOD QUANT: CPT

## 2020-10-07 PROCEDURE — 93451 RIGHT HEART CATH: CPT

## 2020-10-07 PROCEDURE — 36415 COLL VENOUS BLD VENIPUNCTURE: CPT

## 2020-10-07 PROCEDURE — 84295 ASSAY OF SERUM SODIUM: CPT

## 2020-10-07 PROCEDURE — 84132 ASSAY OF SERUM POTASSIUM: CPT

## 2020-10-07 PROCEDURE — 82803 BLOOD GASES ANY COMBINATION: CPT

## 2020-11-10 PROBLEM — M76.01 GLUTEAL TENDINITIS OF RIGHT BUTTOCK: Status: ACTIVE | Noted: 2020-11-10

## (undated) DEVICE — VESSELOOP MINI BLUE STERILE - SURG-I-LOOP (10EA/BX)

## (undated) DEVICE — SET EXTENSION WITH 2 PORTS (48EA/CA) ***PART #2C8610 IS A SUBSTITUTE*****

## (undated) DEVICE — DERMABOND ADVANCED - (12EA/BX)

## (undated) DEVICE — CON SEDATION EA ADDL 15 MIN

## (undated) DEVICE — DISH PETRI STERILE (50EA/CA)

## (undated) DEVICE — DETERGENT RENUZYME PLUS 10 OZ PACKET (50/BX)

## (undated) DEVICE — SUTURE 2-0 SILK 12 X 18" (36PK/BX)"

## (undated) DEVICE — KIT ANESTHESIA W/CIRCUIT & 3/LT BAG W/FILTER (20EA/CA)

## (undated) DEVICE — SPONGE GAUZE NON-STERILE 4X4 - (2000/CA 10PK/CA)

## (undated) DEVICE — BLADE SURGICAL #15 - (50/BX 3BX/CA)

## (undated) DEVICE — MASK ANESTHESIA ADULT  - (100/CA)

## (undated) DEVICE — SUTURE 4-0 MONOCRYL PLUS PS-2 - 27 INCH (36/BX)

## (undated) DEVICE — SENSOR SPO2 NEO LNCS ADHESIVE (20/BX) SEE USER NOTES

## (undated) DEVICE — SUCTION INSTRUMENT YANKAUER BULBOUS TIP W/O VENT (50EA/CA)

## (undated) DEVICE — GLOVE BIOGEL ECLIPSE  PF LATEX SIZE 6.5 (50PR/BX)

## (undated) DEVICE — CANISTER SUCTION 3000ML MECHANICAL FILTER AUTO SHUTOFF MEDI-VAC NONSTERILE LF DISP  (40EA/CA)

## (undated) DEVICE — TUBING CLEARLINK DUO-VENT - C-FLO (48EA/CA)

## (undated) DEVICE — GOWN SURGEONS X-LARGE - DISP. (30/CA)

## (undated) DEVICE — GLOVE BIOGEL SZ 6.5 SURGICAL PF LTX (50PR/BX 4BX/CA)

## (undated) DEVICE — ELECTRODE 850 FOAM ADHESIVE - HYDROGEL RADIOTRNSPRNT (50/PK)

## (undated) DEVICE — SUTURE GENERAL

## (undated) DEVICE — CORDS BIPOLAR COAGULATION - 12FT STERILE DISP. (10EA/BX)

## (undated) DEVICE — ELECTRODE DUAL RETURN W/ CORD - (50/PK)

## (undated) DEVICE — CON SEDATION/>5 YR 1ST 15 MIN

## (undated) DEVICE — HEAD HOLDER JUNIOR/ADULT

## (undated) DEVICE — SET LEADWIRE 5 LEAD BEDSIDE DISPOSABLE ECG (1SET OF 5/EA)

## (undated) DEVICE — SYRINGE 50ML CATHETER TIP (40EA/BX 4BX/CA)

## (undated) DEVICE — CLIP MED INTNL HRZN TI ESCP - (25/BX)

## (undated) DEVICE — SODIUM CHL IRRIGATION 0.9% 1000ML (12EA/CA)

## (undated) DEVICE — CHLORAPREP 26 ML APPLICATOR - ORANGE TINT(25/CA)

## (undated) DEVICE — BASIN EMESIS DISP. - (250/CA)

## (undated) DEVICE — SUTURE 3-0 VICRYL PLUS RB-1 - 8 X 18 INCH (12/BX)

## (undated) DEVICE — KIT ROOM DECONTAMINATION

## (undated) DEVICE — PACK MINOR BASIN - (2EA/CA)

## (undated) DEVICE — LEAD SET 6 DISP. EKG NIHON KOHDEN (100EA/CA) [9859].

## (undated) DEVICE — CLIP SM INTNL HRZN TI ESCP LGT - (24EA/PK 25PK/BX)

## (undated) DEVICE — BOVIE BLADE COATED - (50/PK)

## (undated) DEVICE — NEPTUNE 4 PORT MANIFOLD - (20/PK)

## (undated) DEVICE — PROTECTOR ULNA NERVE - (36PR/CA)

## (undated) DEVICE — GOWN WARMING STANDARD FLEX - (30/CA)

## (undated) DEVICE — BAG DECANTER (50EA/CS)

## (undated) DEVICE — GLOVE BIOGEL SZ 6 PF LATEX - (50EA/BX 4BX/CA)

## (undated) DEVICE — GLOVE BIOGEL PI INDICATOR SZ 6.5 SURGICAL PF LF - (50/BX 4BX/CA)

## (undated) DEVICE — SURGIFOAM (12X7) - (12EA/CA)

## (undated) DEVICE — ELECTRODE NEEDLE NON-SAFETY 2.8 IN (150EA/CT)

## (undated) DEVICE — SOD. CHL. INJ. 0.9% 250 ML - (36/CA 50CA/PF)

## (undated) DEVICE — SHEET THYROID - (10EA/CA)

## (undated) DEVICE — GLOVE BIOGEL INDICATOR SZ 7SURGICAL PF LTX - (50/BX 4BX/CA)

## (undated) DEVICE — GLOVE BIOGEL ECLIPSE PF LATEX SIZE 9.0

## (undated) DEVICE — GELAQUASONIC 100 ULTRASOUND - 48/BX 20GM STERILE FOIL POUCH

## (undated) DEVICE — LACTATED RINGERS INJ 1000 ML - (14EA/CA 60CA/PF)